# Patient Record
Sex: FEMALE | Race: WHITE | NOT HISPANIC OR LATINO | Employment: FULL TIME | URBAN - METROPOLITAN AREA
[De-identification: names, ages, dates, MRNs, and addresses within clinical notes are randomized per-mention and may not be internally consistent; named-entity substitution may affect disease eponyms.]

---

## 2017-07-27 ENCOUNTER — HOSPITAL ENCOUNTER (OUTPATIENT)
Dept: LAB | Facility: MEDICAL CENTER | Age: 72
End: 2017-07-27
Attending: INTERNAL MEDICINE
Payer: COMMERCIAL

## 2017-07-27 ENCOUNTER — HOSPITAL ENCOUNTER (OUTPATIENT)
Dept: RADIOLOGY | Facility: MEDICAL CENTER | Age: 72
End: 2017-07-27
Attending: INTERNAL MEDICINE
Payer: COMMERCIAL

## 2017-07-27 DIAGNOSIS — C50.919 MALIGNANT NEOPLASM OF FEMALE BREAST, UNSPECIFIED LATERALITY, UNSPECIFIED SITE OF BREAST: ICD-10-CM

## 2017-07-27 LAB
25(OH)D3 SERPL-MCNC: 17 NG/ML (ref 30–100)
ALBUMIN SERPL BCP-MCNC: 4.4 G/DL (ref 3.2–4.9)
ALBUMIN/GLOB SERPL: 1.4 G/DL
ALP SERPL-CCNC: 54 U/L (ref 30–99)
ALT SERPL-CCNC: 20 U/L (ref 2–50)
ANION GAP SERPL CALC-SCNC: 12 MMOL/L (ref 0–11.9)
AST SERPL-CCNC: 20 U/L (ref 12–45)
BASOPHILS # BLD AUTO: 0.3 % (ref 0–1.8)
BASOPHILS # BLD: 0.02 K/UL (ref 0–0.12)
BILIRUB SERPL-MCNC: 0.8 MG/DL (ref 0.1–1.5)
BUN SERPL-MCNC: 12 MG/DL (ref 8–22)
CALCIUM SERPL-MCNC: 9.5 MG/DL (ref 8.4–10.2)
CHLORIDE SERPL-SCNC: 106 MMOL/L (ref 96–112)
CO2 SERPL-SCNC: 25 MMOL/L (ref 20–33)
CREAT SERPL-MCNC: 0.84 MG/DL (ref 0.5–1.4)
EOSINOPHIL # BLD AUTO: 0.05 K/UL (ref 0–0.51)
EOSINOPHIL NFR BLD: 0.7 % (ref 0–6.9)
ERYTHROCYTE [DISTWIDTH] IN BLOOD BY AUTOMATED COUNT: 47.4 FL (ref 35.9–50)
GFR SERPL CREATININE-BSD FRML MDRD: >60 ML/MIN/1.73 M 2
GLOBULIN SER CALC-MCNC: 3.1 G/DL (ref 1.9–3.5)
GLUCOSE SERPL-MCNC: 98 MG/DL (ref 65–99)
HCT VFR BLD AUTO: 47 % (ref 37–47)
HGB BLD-MCNC: 15.7 G/DL (ref 12–16)
IMM GRANULOCYTES # BLD AUTO: 0.02 K/UL (ref 0–0.11)
IMM GRANULOCYTES NFR BLD AUTO: 0.3 % (ref 0–0.9)
LYMPHOCYTES # BLD AUTO: 1.59 K/UL (ref 1–4.8)
LYMPHOCYTES NFR BLD: 22.1 % (ref 22–41)
MCH RBC QN AUTO: 31.8 PG (ref 27–33)
MCHC RBC AUTO-ENTMCNC: 33.4 G/DL (ref 33.6–35)
MCV RBC AUTO: 95.1 FL (ref 81.4–97.8)
MONOCYTES # BLD AUTO: 0.42 K/UL (ref 0–0.85)
MONOCYTES NFR BLD AUTO: 5.8 % (ref 0–13.4)
NEUTROPHILS # BLD AUTO: 5.09 K/UL (ref 2–7.15)
NEUTROPHILS NFR BLD: 70.8 % (ref 44–72)
NRBC # BLD AUTO: 0 K/UL
NRBC BLD AUTO-RTO: 0 /100 WBC
PLATELET # BLD AUTO: 199 K/UL (ref 164–446)
PMV BLD AUTO: 10 FL (ref 9–12.9)
POTASSIUM SERPL-SCNC: 4.7 MMOL/L (ref 3.6–5.5)
PROT SERPL-MCNC: 7.5 G/DL (ref 6–8.2)
RBC # BLD AUTO: 4.94 M/UL (ref 4.2–5.4)
SODIUM SERPL-SCNC: 143 MMOL/L (ref 135–145)
WBC # BLD AUTO: 7.2 K/UL (ref 4.8–10.8)

## 2017-07-27 PROCEDURE — 82306 VITAMIN D 25 HYDROXY: CPT

## 2017-07-27 PROCEDURE — 80053 COMPREHEN METABOLIC PANEL: CPT

## 2017-07-27 PROCEDURE — 36415 COLL VENOUS BLD VENIPUNCTURE: CPT

## 2017-07-27 PROCEDURE — 71260 CT THORAX DX C+: CPT

## 2017-07-27 PROCEDURE — 700117 HCHG RX CONTRAST REV CODE 255: Performed by: INTERNAL MEDICINE

## 2017-07-27 PROCEDURE — 85025 COMPLETE CBC W/AUTO DIFF WBC: CPT

## 2017-07-27 RX ADMIN — IOHEXOL 100 ML: 350 INJECTION, SOLUTION INTRAVENOUS at 14:31

## 2017-12-14 ENCOUNTER — HOSPITAL ENCOUNTER (OUTPATIENT)
Dept: RADIOLOGY | Facility: MEDICAL CENTER | Age: 72
End: 2017-12-14
Attending: INTERNAL MEDICINE
Payer: COMMERCIAL

## 2017-12-14 DIAGNOSIS — Z12.39 SCREENING BREAST EXAMINATION: ICD-10-CM

## 2017-12-14 PROCEDURE — G0202 SCR MAMMO BI INCL CAD: HCPCS

## 2018-07-18 ENCOUNTER — HOSPITAL ENCOUNTER (OUTPATIENT)
Dept: RADIOLOGY | Facility: MEDICAL CENTER | Age: 73
End: 2018-07-18
Attending: INTERNAL MEDICINE
Payer: COMMERCIAL

## 2018-07-18 DIAGNOSIS — C50.919 MALIGNANT NEOPLASM OF FEMALE BREAST, UNSPECIFIED ESTROGEN RECEPTOR STATUS, UNSPECIFIED LATERALITY, UNSPECIFIED SITE OF BREAST (HCC): ICD-10-CM

## 2018-07-18 PROCEDURE — 700117 HCHG RX CONTRAST REV CODE 255: Performed by: INTERNAL MEDICINE

## 2018-07-18 PROCEDURE — 77080 DXA BONE DENSITY AXIAL: CPT

## 2018-07-18 PROCEDURE — 71260 CT THORAX DX C+: CPT

## 2018-07-18 RX ADMIN — IOHEXOL 75 ML: 350 INJECTION, SOLUTION INTRAVENOUS at 10:34

## 2018-12-28 ENCOUNTER — HOSPITAL ENCOUNTER (OUTPATIENT)
Dept: RADIOLOGY | Facility: MEDICAL CENTER | Age: 73
End: 2018-12-28
Attending: INTERNAL MEDICINE
Payer: COMMERCIAL

## 2018-12-28 DIAGNOSIS — Z12.31 BREAST CANCER SCREENING BY MAMMOGRAM: ICD-10-CM

## 2018-12-28 PROCEDURE — 77067 SCR MAMMO BI INCL CAD: CPT

## 2019-08-19 ENCOUNTER — HOSPITAL ENCOUNTER (OUTPATIENT)
Dept: RADIOLOGY | Facility: MEDICAL CENTER | Age: 74
End: 2019-08-19
Attending: INTERNAL MEDICINE
Payer: COMMERCIAL

## 2019-08-19 DIAGNOSIS — Z90.10 POSTMASTECTOMY LYMPHANGIOSARCOMA SYNDROME, UNSPECIFIED LATERALITY (HCC): ICD-10-CM

## 2019-08-19 DIAGNOSIS — C50.919 POSTMASTECTOMY LYMPHANGIOSARCOMA SYNDROME, UNSPECIFIED LATERALITY (HCC): ICD-10-CM

## 2019-08-19 PROCEDURE — 71260 CT THORAX DX C+: CPT

## 2019-08-19 PROCEDURE — 700117 HCHG RX CONTRAST REV CODE 255: Performed by: INTERNAL MEDICINE

## 2019-08-19 RX ADMIN — IOHEXOL 75 ML: 350 INJECTION, SOLUTION INTRAVENOUS at 14:01

## 2019-12-11 ENCOUNTER — HOSPITAL ENCOUNTER (OUTPATIENT)
Dept: RADIOLOGY | Facility: MEDICAL CENTER | Age: 74
End: 2019-12-11
Attending: INTERNAL MEDICINE
Payer: COMMERCIAL

## 2019-12-11 DIAGNOSIS — Z90.10 POSTMASTECTOMY LYMPHANGIOSARCOMA SYNDROME, UNSPECIFIED LATERALITY (HCC): ICD-10-CM

## 2019-12-11 DIAGNOSIS — C50.919 POSTMASTECTOMY LYMPHANGIOSARCOMA SYNDROME, UNSPECIFIED LATERALITY (HCC): ICD-10-CM

## 2019-12-11 PROCEDURE — 71260 CT THORAX DX C+: CPT

## 2019-12-11 PROCEDURE — 700117 HCHG RX CONTRAST REV CODE 255: Performed by: INTERNAL MEDICINE

## 2019-12-11 RX ADMIN — IOHEXOL 75 ML: 350 INJECTION, SOLUTION INTRAVENOUS at 12:58

## 2019-12-30 ENCOUNTER — HOSPITAL ENCOUNTER (OUTPATIENT)
Dept: RADIOLOGY | Facility: MEDICAL CENTER | Age: 74
End: 2019-12-30
Attending: INTERNAL MEDICINE
Payer: COMMERCIAL

## 2019-12-30 DIAGNOSIS — Z12.31 VISIT FOR SCREENING MAMMOGRAM: ICD-10-CM

## 2019-12-30 PROCEDURE — 77063 BREAST TOMOSYNTHESIS BI: CPT

## 2020-03-20 ENCOUNTER — OFFICE VISIT (OUTPATIENT)
Dept: PULMONOLOGY | Facility: HOSPICE | Age: 75
End: 2020-03-20
Payer: COMMERCIAL

## 2020-03-20 ENCOUNTER — APPOINTMENT (OUTPATIENT)
Dept: PULMONOLOGY | Facility: HOSPICE | Age: 75
End: 2020-03-20
Payer: COMMERCIAL

## 2020-03-20 VITALS
HEIGHT: 63 IN | HEART RATE: 86 BPM | WEIGHT: 158.13 LBS | BODY MASS INDEX: 28.02 KG/M2 | DIASTOLIC BLOOD PRESSURE: 90 MMHG | OXYGEN SATURATION: 95 % | SYSTOLIC BLOOD PRESSURE: 140 MMHG

## 2020-03-20 DIAGNOSIS — J43.9 PULMONARY EMPHYSEMA, UNSPECIFIED EMPHYSEMA TYPE (HCC): ICD-10-CM

## 2020-03-20 DIAGNOSIS — R91.8 PULMONARY NODULES: ICD-10-CM

## 2020-03-20 PROCEDURE — 99204 OFFICE O/P NEW MOD 45 MIN: CPT | Performed by: INTERNAL MEDICINE

## 2020-03-20 RX ORDER — ATORVASTATIN CALCIUM 10 MG/1
10 TABLET, FILM COATED ORAL DAILY
COMMUNITY
Start: 2020-02-20

## 2020-03-20 RX ORDER — DENOSUMAB 60 MG/ML
60 INJECTION SUBCUTANEOUS ONCE
COMMUNITY
End: 2022-11-30

## 2020-03-20 NOTE — PROGRESS NOTES
Emily Barnes is a 75 y.o. female here for right hilar mass with underlying breast cancer remotely and smoking history. Patient was referred by her primary care.    History of Present Illness:      This lady comes in at the request of Dr. Alcantar, medical oncology for assessment of a right hilar mass.  She was seen by the oncology specialist recently, in December was noted that there was a slight increase the right hilar density.  I reviewed prior films from 2018 and 2016 and I do note that there was some fullness in that area at that time, the radiologist comments on this as well.  It is unclear if this represents a recurrence of her prior right breast cancer, in August 2011 she underwent partial right mastectomy, and then that was followed up by radiation therapy, she declined chemotherapy.    In 2016 a right hilar node was seen, followed up in 2018 and then in December 2019 it was noted to be slightly increased.    She has a long smoking history, 2 packs/day and has done so for many years.  It is certainly possible this could be a primary lung cancer, but the fact that it had been present dating back over 4 years and again 2 years ago only subtly increased changes the potential scenarios.  A needle biopsy was ordered but the radiologist did not feel it could be done safely.  I think we should proceed first with a PET scan, if this shows uptake then we will arrange for bronchoscopic evaluation and possible biopsy, perhaps even E bus as this is Chris and may not be endobronchial.  We will arrange for the PET scan, and if the PET scan is cold or shows no uptake, then I will discuss with the patient whether not we continue close surveillance or proceed with the bronchoscopic sampling.    At our next visit we will review the PET scan, the needle biopsy was canceled by the radiologist, we will decide if we want to proceed with bronchoscopic evaluation but that depends on PET scan and further discussion with this  patient.    Constitutional ROS: No unexpected change in weight, No unexplained fevers  Eyes: No change in vision or blurring or double vision  Mouth/Throat ROS: No sore throat, No recent change in voice or hoarseness  Pulmonary ROS: See present history for pertinent positives  Cardiovascular ROS: No chest pain to suggest acute coronary syndrome  Gastrointestinal ROS: No abdominal pain to suggest peptic disease  Musculoskeletal/Extremities ROS: no acute artritis or unusual swelling  Hematologic/Lymphatic ROS: No easy bleeding or unusual lymph node swelling  Neurologic ROS: No new or unusual weakness  Psychiatric ROS: No hallucinations  Allergic/Immunologic: No  urticaria or allergic rash      Current Outpatient Medications   Medication Sig Dispense Refill   • atorvastatin (LIPITOR) 10 MG Tab Take 10 mg by mouth every day.     • denosumab (PROLIA) 60 MG/ML Solution Prefilled Syringe Inject 60 mg as instructed Once. Twice a year     • anastrozole (ARIMIDEX) 1 MG TABS Take 1 mg by mouth every day.     • Alendronate Sodium (FOSAMAX PO) Take  by mouth every 7 days.       No current facility-administered medications for this visit.        Social History     Tobacco Use   • Smoking status: Former Smoker     Packs/day: 2.00     Years: 58.00     Pack years: 116.00     Types: Cigarettes     Last attempt to quit:      Years since quittin.2   • Smokeless tobacco: Never Used   Substance Use Topics   • Alcohol use: Yes     Comment: 4 per week   • Drug use: No        Past Medical History:   Diagnosis Date   • Breast cancer (HCC)    • Cancer (HCC)     breast   • Cough    • Dental disorder     upper and lower dentures   • EMPHYSEMA    • Pain     occasional breast pain   • Renal disorder     hx of stones   • Renal disorder     stones and cyst   • Snoring    • Sputum production        Past Surgical History:   Procedure Laterality Date   • COLONOSCOPY WITH BIOPSY  3/15/2012    Performed by RC FANG at SURGERY  "[facility] ORS   • BREAST BIOPSY  8/23/2011    Performed by MOLLY PARRA at SURGERY SAME DAY [facility] ORS   • NODE BIOPSY SENTINEL  8/8/2011    Performed by MOLLY PARRA at SURGERY SAME DAY [facility] ORS   • LUMPECTOMY  8/8/2011    Performed by MOLLY PARRA at SURGERY SAME DAY [facility] ORS   • TONSILLECTOMY  1972   • PB RADIATION THERAPY PLAN SIMPLE         Allergies: Flu virus vaccine    Family History   Problem Relation Age of Onset   • Cancer Other    • Stroke Other    • Heart Disease Other    • Lung Cancer Mother        Physical Examination    Vitals:    03/20/20 1331   Height: 1.6 m (5' 3\")   Weight: 71.7 kg (158 lb 2 oz)   Weight % change since last entry.: 0 %   BP: 140/90   Pulse: 86   BMI (Calculated): 28.01       General Appearance: alert, no distress  Skin: Skin color, texture, turgor normal. No rashes or lesions.  Eyes: negative  Oropharynx: Lips, mucosa, and tongue normal. Teeth and gums normal. Oropharynx moist and without lesion  Lungs: positive findings: Scattered rhonchi without wheezes or rales  Heart: negative. RRR without murmur, gallop, or rubs.  No ectopy.  Abdomen: Abdomen soft, non-tender. . No masses,  No organomegaly  Extremities:  No deformities, edema, or skin discoloration  Joints: No acute arthritis  Peripheral Pulses:perfused  Neurologic: intact grossly  No cervical adenopathy    I (soft palate, uvula, fauces, tonsillar pillars visible)    Imaging: CT scan December 2019 showed slight increase in right hilar fullness see discussion above    PFTS: None presently      Assessment and Plan  1. Pulmonary nodule/R hilar mass  History of breast cancer, also smoking history, see detailed discussion    2. Pulmonary emphysema, unspecified emphysema type (HCC)  No clinical asthma or exercise restriction      Arrange for PET scan, follow-up after to decide if we need to do bronchoscopic examination, of note right hilar fullness has been present for a number of years but has suddenly " changed recently  Followup Return in about 4 weeks (around 4/17/2020) for follow up visit with Dr. Jazmín Crain.

## 2020-03-20 NOTE — PATIENT INSTRUCTIONS
This lady comes in at the request of Dr. Alcantar, medical oncology for assessment of a right hilar mass.  She was seen by the oncology specialist recently, in December was noted that there was a slight increase the right hilar density.  I reviewed prior films from 2018 and 2016 and I do note that there was some fullness in that area at that time, the radiologist comments on this as well.  It is unclear if this represents a recurrence of her prior right breast cancer, in August 2011 she underwent partial right mastectomy, and then that was followed up by radiation therapy, she declined chemotherapy.    In 2016 a right hilar node was seen, followed up in 2018 and then in December 2019 it was noted to be slightly increased.    She has a long smoking history, 2 packs/day and has done so for many years.  It is certainly possible this could be a primary lung cancer, but the fact that it had been present dating back over 4 years and again 2 years ago only subtly increased changes the potential scenarios.  A needle biopsy was ordered but the radiologist did not feel it could be done safely.  I think we should proceed first with a PET scan, if this shows uptake then we will arrange for bronchoscopic evaluation and possible biopsy, perhaps even E bus as this is Chris and may not be endobronchial.  We will arrange for the PET scan, and if the PET scan is cold or shows no uptake, then I will discuss with the patient whether not we continue close surveillance or proceed with the bronchoscopic sampling.    At our next visit we will review the PET scan, the needle biopsy was canceled by the radiologist, we will decide if we want to proceed with bronchoscopic evaluation but that depends on PET scan and further discussion with this patient.

## 2020-04-06 ENCOUNTER — HOSPITAL ENCOUNTER (OUTPATIENT)
Dept: RADIOLOGY | Facility: MEDICAL CENTER | Age: 75
End: 2020-04-06
Attending: INTERNAL MEDICINE
Payer: COMMERCIAL

## 2020-04-06 DIAGNOSIS — R91.8 PULMONARY NODULES: ICD-10-CM

## 2020-04-06 PROCEDURE — A9552 F18 FDG: HCPCS

## 2020-04-21 ENCOUNTER — OFFICE VISIT (OUTPATIENT)
Dept: PULMONOLOGY | Facility: HOSPICE | Age: 75
End: 2020-04-21
Payer: COMMERCIAL

## 2020-04-21 ENCOUNTER — TELEPHONE (OUTPATIENT)
Dept: PULMONOLOGY | Facility: HOSPICE | Age: 75
End: 2020-04-21

## 2020-04-21 VITALS
TEMPERATURE: 97.8 F | HEART RATE: 80 BPM | SYSTOLIC BLOOD PRESSURE: 142 MMHG | RESPIRATION RATE: 16 BRPM | WEIGHT: 162 LBS | OXYGEN SATURATION: 96 % | HEIGHT: 63 IN | DIASTOLIC BLOOD PRESSURE: 90 MMHG | BODY MASS INDEX: 28.7 KG/M2

## 2020-04-21 DIAGNOSIS — R91.8 PULMONARY NODULES: ICD-10-CM

## 2020-04-21 DIAGNOSIS — C50.911 CARCINOMA OF RIGHT BREAST METASTATIC TO LUNG (HCC): ICD-10-CM

## 2020-04-21 DIAGNOSIS — C78.00 CARCINOMA OF RIGHT BREAST METASTATIC TO LUNG (HCC): ICD-10-CM

## 2020-04-21 DIAGNOSIS — J43.9 PULMONARY EMPHYSEMA, UNSPECIFIED EMPHYSEMA TYPE (HCC): ICD-10-CM

## 2020-04-21 PROBLEM — C50.919 BREAST CANCER METASTASIZED TO LUNG (HCC): Status: ACTIVE | Noted: 2020-04-21

## 2020-04-21 PROCEDURE — 90670 PCV13 VACCINE IM: CPT | Performed by: INTERNAL MEDICINE

## 2020-04-21 PROCEDURE — 99214 OFFICE O/P EST MOD 30 MIN: CPT | Mod: 25 | Performed by: INTERNAL MEDICINE

## 2020-04-21 PROCEDURE — 90471 IMMUNIZATION ADMIN: CPT | Performed by: INTERNAL MEDICINE

## 2020-04-21 ASSESSMENT — PAIN SCALES - GENERAL: PAINLEVEL: NO PAIN

## 2020-04-21 NOTE — PATIENT INSTRUCTIONS
Emily Barnes is a [facility] lady[place]  [place] aware of all the gail on in [place] and [place].    She comes in today to follow-up on her history of breast cancer in 2011, recent discovery of a right hilar mass, ongoing smoking history and concerned that she could have either bronchogenic malignancy or a metastatic lesion from the prior breast.  PET scan shows uptake and is positive in that region, but remarkably the PET scan is only hot in the right hilar lymph node.  The SUV is 8.3, FDG avid.    I discussed this with my partner Dr. Raymon Foster, she is a candidate for endobronchial ultrasound needle biopsy, the radiologist had previously declined to do an external needle biopsy.  We do have the current caution because of the COVID virus, but under general anesthetic with clear indications we will try to arrange this, Dr. Raymon Foster will be communicating with the facility at [facility], and we will send this through surgical scheduling to have this accomplished as well.  I will see her back after.    Of note no interval hemoptysis weight loss and she is trying to stop smoking.  We will give her the Prevnar vaccine today and I will see her back in about 6 weeks

## 2020-04-21 NOTE — PROGRESS NOTES
Emily Barnes is a 75 y.o. female here for right hilar mass and results of imaging with history of breast cancer and suspicion of bronchogenic malignancy. Patient was referred by her primary care doctor and medical oncologist.    History of Present Illness:      Constitutional ROS: No unexpected change in weight, No unexplained fevers  Eyes: No change in vision or blurring or double vision  Mouth/Throat ROS: No sore throat, No recent change in voice or hoarseness  Pulmonary ROS: See present history for pertinent positives  Cardiovascular ROS: No chest pain to suggest acute coronary syndrome  Gastrointestinal ROS: No abdominal pain to suggest peptic disease  Musculoskeletal/Extremities ROS: no acute artritis or unusual swelling  Hematologic/Lymphatic ROS: No easy bleeding or unusual lymph node swelling  Neurologic ROS: No new or unusual weakness  Psychiatric ROS: No hallucinations  Allergic/Immunologic: No  urticaria or allergic rash      Current Outpatient Medications   Medication Sig Dispense Refill   • atorvastatin (LIPITOR) 10 MG Tab Take 10 mg by mouth every day.     • denosumab (PROLIA) 60 MG/ML Solution Prefilled Syringe Inject 60 mg as instructed Once. Twice a year     • anastrozole (ARIMIDEX) 1 MG TABS Take 1 mg by mouth every day.     • Alendronate Sodium (FOSAMAX PO) Take  by mouth every 7 days.       No current facility-administered medications for this visit.        Social History     Tobacco Use   • Smoking status: Former Smoker     Packs/day: 2.00     Years: 58.00     Pack years: 116.00     Types: Cigarettes     Last attempt to quit: 2     Years since quittin.3   • Smokeless tobacco: Never Used   Substance Use Topics   • Alcohol use: Yes     Comment: 4 per week   • Drug use: No        Past Medical History:   Diagnosis Date   • Breast cancer (HCC)    • Cancer (HCC) 2011    breast   • Cough    • Dental disorder     upper and lower dentures   • EMPHYSEMA    • Pain     occasional breast pain  "  • Renal disorder 1997    hx of stones   • Renal disorder     stones and cyst   • Snoring    • Sputum production        Past Surgical History:   Procedure Laterality Date   • COLONOSCOPY WITH BIOPSY  3/15/2012    Performed by RC FANG at SURGERY [facility] ORS   • BREAST BIOPSY  8/23/2011    Performed by MOLLY PARRA at SURGERY SAME DAY [facility] ORS   • NODE BIOPSY SENTINEL  8/8/2011    [facility] MOLLY PARRA [facility]   • LUMPECTOMY  8/8/2011    Performed by MOLLY PARRA at SURGERY SAME DAY [facility] ORS   • TONSILLECTOMY  1972   • PB RADIATION THERAPY PLAN SIMPLE         Allergies: Flu virus vaccine    Family History   Problem Relation Age of Onset   • Cancer Other    • Stroke Other    • Heart Disease Other    • Lung Cancer Mother    • Heart Disease Father        Physical Examination    Vitals:    04/21/20 1336   Height: 1.6 m (5' 3\")   Weight: 73.5 kg (162 lb)   Weight % change since last entry.: 0 %   BP: 142/90   Pulse: 80   BMI (Calculated): 28.7   Resp: 16   Temp: 36.6 °C (97.8 °F)   TempSrc: Oral       General Appearance: alert, no distress  Skin: Skin color, texture, turgor normal. No rashes or lesions.  Eyes: negative  Oropharynx: Lips, mucosa, and tongue normal. Teeth and gums normal. Oropharynx moist and without lesion  Lungs: positive findings: Quiet and clear  Heart: negative. RRR without murmur, gallop, or rubs.  No ectopy.  Abdomen: Abdomen soft, non-tender. . No masses,  No organomegaly  Extremities:  No deformities, edema, or skin discoloration  Joints: No acute arthritis  Peripheral Pulses:perfused  Neurologic: intact grossly  No clubbing    II (soft palate, uvula, fauces visible)    Imaging: PET scan noted with right hilar lymph node very concerning for either metastatic disease or associated bronchogenic malignancy, FDG avid with SUV 8.3    PFTS: None at this time      Assessment and Plan  1. Pulmonary nodule/R hilar mass  Evaluate for endobronchial " ultrasound under general anesthetic at [facility]; radiology declined external needle biopsy  - Bronchoscopy; Future  - Pneumococcal Conjugate Vaccine 13-Valent    2. Pulmonary emphysema, unspecified emphysema type (HCC)  No active bronchospasm    3. Carcinoma of right breast potentially metastatic to lung (HCC)  In 2011, radiation therapy, no chemotherapy, sees Dr. Alcantar        Followup Return in about 6 weeks (around 6/2/2020) for follow up visit with Dr. Jazmín Crain.

## 2020-04-27 NOTE — TELEPHONE ENCOUNTER
Emily spoke to another MA in office, asking to schedule Bronch. I called and advised Emily that the procedure still has not been approved by the COVID team as of yet.   I will call her when I find out if it has been approved. Patient understands

## 2020-04-28 NOTE — TELEPHONE ENCOUNTER
Bronchoscopy approved; per surgery scheduling, we need to contact Infection Prevention at [phone] to arrange for them to get COVID testing, 72 hours prior to bronch and the day before.     Call placed to patient to explain process

## 2020-04-28 NOTE — TELEPHONE ENCOUNTER
Patient verbally understands pre op instructions.   Advised about the COVID 19 testing, I do not know all the details as of yet but told her I would call her as soon as I knew something.   She understands   Bronch is cheduled for 5/5/2020 checking in at 0800 for a 1000 start time.

## 2020-04-29 RX ORDER — MULTIVIT-MIN/IRON/FOLIC ACID/K 18-600-40
4000 CAPSULE ORAL DAILY
COMMUNITY

## 2020-04-29 NOTE — OR NURSING
"Preadmit appointment: \" Preparing for your Procedure information\" sheet given to patient with verbal instructions. Patient instructed to continue prescribed medications through the day before surgery, instructed to take the following medications the day of surgery per anesthesia protocol: none, pt takes her medications in the afternoon.  Pt denies any issues with anesthesia.  Pt denies respiratory symptoms or fever other than her usual \"smoker\"s cough\" and denies any changes with her cough or exposure to anyone with positive [id number]- advised patient if this changes prior to her procedure to notify Dr. Foster's office prior to coming in for her procedure.   "

## 2020-04-30 ENCOUNTER — APPOINTMENT (OUTPATIENT)
Dept: ADMISSIONS | Facility: MEDICAL CENTER | Age: 75
End: 2020-04-30
Payer: COMMERCIAL

## 2020-04-30 ENCOUNTER — HOSPITAL ENCOUNTER (OUTPATIENT)
Facility: MEDICAL CENTER | Age: 75
End: 2020-04-30
Attending: INTERNAL MEDICINE
Payer: COMMERCIAL

## 2020-04-30 LAB — COVID ORDER STATUS COVID19: NORMAL

## 2020-04-30 NOTE — TELEPHONE ENCOUNTER
Per COVID protocol with testing we needed to move bronch to 5/7/2020.   Emily is currently being tested for Covid and has her second test today. I have left a message for Ledy FIERRO and patricia admit to see if she still needs to do more next week.

## 2020-05-01 LAB
SARS-COV-2 RNA RESP QL NAA+PROBE: NOT DETECTED
SPECIMEN SOURCE: NORMAL

## 2020-05-06 NOTE — OR NURSING
COVID-19 Pre-surgery screenin. Do you have an undiagnosed respiratory illness or symptoms such as coughing or sneezing? NO (Yes/No)    2. Do you have an unexplained fever greater than 100.4 degrees Fahrenheit or 38 degrees Celsius?     NO (Yes/No)    3. Have you had direct exposure to a patient who tested positive for Covid-19?    NO (Yes/No)

## 2020-05-07 ENCOUNTER — HOSPITAL ENCOUNTER (OUTPATIENT)
Facility: MEDICAL CENTER | Age: 75
End: 2020-05-07
Attending: INTERNAL MEDICINE | Admitting: INTERNAL MEDICINE
Payer: COMMERCIAL

## 2020-05-07 ENCOUNTER — ANESTHESIA EVENT (OUTPATIENT)
Dept: SURGERY | Facility: MEDICAL CENTER | Age: 75
End: 2020-05-07
Payer: COMMERCIAL

## 2020-05-07 ENCOUNTER — ANESTHESIA (OUTPATIENT)
Dept: SURGERY | Facility: MEDICAL CENTER | Age: 75
End: 2020-05-07
Payer: COMMERCIAL

## 2020-05-07 VITALS
SYSTOLIC BLOOD PRESSURE: 117 MMHG | DIASTOLIC BLOOD PRESSURE: 69 MMHG | TEMPERATURE: 97.5 F | BODY MASS INDEX: 28.71 KG/M2 | HEIGHT: 63 IN | HEART RATE: 81 BPM | WEIGHT: 162.04 LBS | RESPIRATION RATE: 16 BRPM | OXYGEN SATURATION: 88 %

## 2020-05-07 LAB
EKG IMPRESSION: NORMAL
PATHOLOGY CONSULT NOTE: NORMAL

## 2020-05-07 PROCEDURE — 700105 HCHG RX REV CODE 258: Performed by: INTERNAL MEDICINE

## 2020-05-07 PROCEDURE — 160048 HCHG OR STATISTICAL LEVEL 1-5: Performed by: INTERNAL MEDICINE

## 2020-05-07 PROCEDURE — 88172 CYTP DX EVAL FNA 1ST EA SITE: CPT

## 2020-05-07 PROCEDURE — 700101 HCHG RX REV CODE 250: Performed by: ANESTHESIOLOGY

## 2020-05-07 PROCEDURE — 700111 HCHG RX REV CODE 636 W/ 250 OVERRIDE (IP): Performed by: ANESTHESIOLOGY

## 2020-05-07 PROCEDURE — A9270 NON-COVERED ITEM OR SERVICE: HCPCS | Performed by: INTERNAL MEDICINE

## 2020-05-07 PROCEDURE — 93005 ELECTROCARDIOGRAM TRACING: CPT | Performed by: INTERNAL MEDICINE

## 2020-05-07 PROCEDURE — 88341 IMHCHEM/IMCYTCHM EA ADD ANTB: CPT | Mod: 91

## 2020-05-07 PROCEDURE — 500066 HCHG BITE BLOCK, ECT: Performed by: INTERNAL MEDICINE

## 2020-05-07 PROCEDURE — 160041 HCHG SURGERY MINUTES - EA ADDL 1 MIN LEVEL 4: Performed by: INTERNAL MEDICINE

## 2020-05-07 PROCEDURE — 160009 HCHG ANES TIME/MIN: Performed by: INTERNAL MEDICINE

## 2020-05-07 PROCEDURE — 160029 HCHG SURGERY MINUTES - 1ST 30 MINS LEVEL 4: Performed by: INTERNAL MEDICINE

## 2020-05-07 PROCEDURE — 160036 HCHG PACU - EA ADDL 30 MINS PHASE I: Performed by: INTERNAL MEDICINE

## 2020-05-07 PROCEDURE — 160046 HCHG PACU - 1ST 60 MINS PHASE II: Performed by: INTERNAL MEDICINE

## 2020-05-07 PROCEDURE — 160025 RECOVERY II MINUTES (STATS): Performed by: INTERNAL MEDICINE

## 2020-05-07 PROCEDURE — 31624 DX BRONCHOSCOPE/LAVAGE: CPT | Performed by: INTERNAL MEDICINE

## 2020-05-07 PROCEDURE — 302978 HCHG BRONCHOSCOPY-DIAGNOSTIC

## 2020-05-07 PROCEDURE — 160035 HCHG PACU - 1ST 60 MINS PHASE I: Performed by: INTERNAL MEDICINE

## 2020-05-07 PROCEDURE — 31652 BRONCH EBUS SAMPLNG 1/2 NODE: CPT | Performed by: INTERNAL MEDICINE

## 2020-05-07 PROCEDURE — 88173 CYTOPATH EVAL FNA REPORT: CPT | Mod: 91

## 2020-05-07 PROCEDURE — 700101 HCHG RX REV CODE 250: Performed by: INTERNAL MEDICINE

## 2020-05-07 PROCEDURE — 88112 CYTOPATH CELL ENHANCE TECH: CPT

## 2020-05-07 PROCEDURE — 88342 IMHCHEM/IMCYTCHM 1ST ANTB: CPT

## 2020-05-07 PROCEDURE — 88305 TISSUE EXAM BY PATHOLOGIST: CPT | Mod: 59

## 2020-05-07 PROCEDURE — 160002 HCHG RECOVERY MINUTES (STAT): Performed by: INTERNAL MEDICINE

## 2020-05-07 PROCEDURE — 160047 HCHG PACU  - EA ADDL 30 MINS PHASE II: Performed by: INTERNAL MEDICINE

## 2020-05-07 PROCEDURE — 700102 HCHG RX REV CODE 250 W/ 637 OVERRIDE(OP): Performed by: INTERNAL MEDICINE

## 2020-05-07 PROCEDURE — 93010 ELECTROCARDIOGRAM REPORT: CPT | Performed by: INTERNAL MEDICINE

## 2020-05-07 RX ORDER — MEPERIDINE HYDROCHLORIDE 25 MG/ML
INJECTION INTRAMUSCULAR; INTRAVENOUS; SUBCUTANEOUS PRN
Status: DISCONTINUED | OUTPATIENT
Start: 2020-05-07 | End: 2020-05-07 | Stop reason: SURG

## 2020-05-07 RX ORDER — METOPROLOL TARTRATE 1 MG/ML
1 INJECTION, SOLUTION INTRAVENOUS
Status: DISCONTINUED | OUTPATIENT
Start: 2020-05-07 | End: 2020-05-07 | Stop reason: HOSPADM

## 2020-05-07 RX ORDER — SODIUM CHLORIDE, SODIUM LACTATE, POTASSIUM CHLORIDE, CALCIUM CHLORIDE 600; 310; 30; 20 MG/100ML; MG/100ML; MG/100ML; MG/100ML
INJECTION, SOLUTION INTRAVENOUS CONTINUOUS
Status: DISCONTINUED | OUTPATIENT
Start: 2020-05-07 | End: 2020-05-07 | Stop reason: HOSPADM

## 2020-05-07 RX ORDER — HALOPERIDOL 5 MG/ML
1 INJECTION INTRAMUSCULAR
Status: DISCONTINUED | OUTPATIENT
Start: 2020-05-07 | End: 2020-05-07 | Stop reason: HOSPADM

## 2020-05-07 RX ORDER — MEPERIDINE HYDROCHLORIDE 25 MG/ML
25 INJECTION INTRAMUSCULAR; INTRAVENOUS; SUBCUTANEOUS
Status: DISCONTINUED | OUTPATIENT
Start: 2020-05-07 | End: 2020-05-07 | Stop reason: HOSPADM

## 2020-05-07 RX ORDER — ONDANSETRON 2 MG/ML
4 INJECTION INTRAMUSCULAR; INTRAVENOUS
Status: DISCONTINUED | OUTPATIENT
Start: 2020-05-07 | End: 2020-05-07 | Stop reason: HOSPADM

## 2020-05-07 RX ORDER — ONDANSETRON 2 MG/ML
INJECTION INTRAMUSCULAR; INTRAVENOUS PRN
Status: DISCONTINUED | OUTPATIENT
Start: 2020-05-07 | End: 2020-05-07 | Stop reason: SURG

## 2020-05-07 RX ORDER — LIDOCAINE HYDROCHLORIDE 40 MG/ML
SOLUTION TOPICAL PRN
Status: DISCONTINUED | OUTPATIENT
Start: 2020-05-07 | End: 2020-05-07 | Stop reason: SURG

## 2020-05-07 RX ORDER — DIPHENHYDRAMINE HYDROCHLORIDE 50 MG/ML
12.5 INJECTION INTRAMUSCULAR; INTRAVENOUS
Status: DISCONTINUED | OUTPATIENT
Start: 2020-05-07 | End: 2020-05-07 | Stop reason: HOSPADM

## 2020-05-07 RX ADMIN — POVIDONE-IODINE 15 ML: 10 SOLUTION TOPICAL at 11:44

## 2020-05-07 RX ADMIN — ROCURONIUM BROMIDE 5 MG: 10 INJECTION INTRAVENOUS at 12:26

## 2020-05-07 RX ADMIN — LIDOCAINE HYDROCHLORIDE 20 MG: 20 INJECTION, SOLUTION INFILTRATION; PERINEURAL at 12:26

## 2020-05-07 RX ADMIN — SODIUM CHLORIDE, POTASSIUM CHLORIDE, SODIUM LACTATE AND CALCIUM CHLORIDE: 600; 310; 30; 20 INJECTION, SOLUTION INTRAVENOUS at 11:44

## 2020-05-07 RX ADMIN — ROCURONIUM BROMIDE 25 MG: 10 INJECTION INTRAVENOUS at 12:37

## 2020-05-07 RX ADMIN — PROPOFOL 20 MG: 10 INJECTION, EMULSION INTRAVENOUS at 13:22

## 2020-05-07 RX ADMIN — PROPOFOL 40 MG: 10 INJECTION, EMULSION INTRAVENOUS at 12:28

## 2020-05-07 RX ADMIN — ONDANSETRON 4 MG: 2 INJECTION INTRAMUSCULAR; INTRAVENOUS at 13:17

## 2020-05-07 RX ADMIN — ALBUTEROL SULFATE 2.5 MG: 2.5 SOLUTION RESPIRATORY (INHALATION) at 15:43

## 2020-05-07 RX ADMIN — ALFENTANIL HYDROCHLORIDE 1000 MCG: 500 INJECTION, SOLUTION INTRAVENOUS at 12:26

## 2020-05-07 RX ADMIN — SUGAMMADEX 150 MG: 100 INJECTION, SOLUTION INTRAVENOUS at 13:26

## 2020-05-07 RX ADMIN — ROCURONIUM BROMIDE 10 MG: 10 INJECTION INTRAVENOUS at 13:16

## 2020-05-07 RX ADMIN — LIDOCAINE HYDROCHLORIDE 160 MG: 40 SOLUTION TOPICAL at 12:29

## 2020-05-07 RX ADMIN — PROPOFOL 20 MG: 10 INJECTION, EMULSION INTRAVENOUS at 12:26

## 2020-05-07 RX ADMIN — MEPERIDINE HYDROCHLORIDE 25 MG: 25 INJECTION INTRAMUSCULAR; INTRAVENOUS; SUBCUTANEOUS at 12:40

## 2020-05-07 RX ADMIN — LIDOCAINE HYDROCHLORIDE 0.5 ML: 10 INJECTION, SOLUTION INFILTRATION; PERINEURAL at 11:38

## 2020-05-07 RX ADMIN — SUCCINYLCHOLINE CHLORIDE 100 MG: 20 INJECTION, SOLUTION INTRAMUSCULAR; INTRAVENOUS at 12:28

## 2020-05-07 ASSESSMENT — PAIN SCALES - GENERAL: PAIN_LEVEL: 0

## 2020-05-07 NOTE — OR NURSING
1452- Patient dressed and resting in recliner chair on 1L O2 via mask.  1510- O2 dc'd; on room air.  1520- IS initiated; achieving 750.  1525- Ambulated on room air.  1545- Wheezing noted; albuterol treatment administered.  1550- Albuterol treatment finished. Will continue to monitor oxygen saturation.  1610- Wheezing improved and O2 saturation maintained at 90% and above. Meets criteria for dc to home.  1612- Discharge instructions completed at [facility]. Discharged to care of family.

## 2020-05-07 NOTE — OR NURSING
1335- To PACU from OR, Report Received, Pt upright on gurney, coughing, respirations spontaneous and non-labored with simple mask.  Monitors on VSS.    1350-loose sounding cough but non-productive.  Pt alert, denies pain, 02 at 5 liters mask.

## 2020-05-07 NOTE — ANESTHESIA POSTPROCEDURE EVALUATION
Patient: Emily Barnes    Procedure Summary     Date:  05/07/20 Room / Location:   PROCEDURE ROOM / SURGERY [facility]    Anesthesia Start:  1225 Anesthesia Stop:  1335    Procedures:       BRONCHOSCOPY-FIBER OPTIC WITH POSSIBLE WASH, BRUSH, BROCHOALVEOLAR LAVAGE, BIOPSY FINE NEEDLE ASPIRATION (Chest)      ENDOBRONCHIAL ULTRASOUND (EBUS) Diagnosis:  (PULMONARY NODULES  pending pathology)    Surgeon:  Peter Foster M.D. Responsible Provider:  Lev Momin M.D.    Anesthesia Type:  general ASA Status:  4          Final Anesthesia Type: general  Last vitals  BP   Blood Pressure : 140/83    Temp   37.3 °C (99.1 °F)    Pulse   Pulse: 85   Resp   18    SpO2          Anesthesia Post Evaluation    Patient location during evaluation: PACU  Patient participation: complete - patient participated  Level of consciousness: awake and alert  Pain score: 0    Airway patency: patent  Anesthetic complications: no  Cardiovascular status: hemodynamically stable  Respiratory status: acceptable  Hydration status: euvolemic    PONV: none           Nurse Pain Score: 0 (NPRS)

## 2020-05-07 NOTE — DISCHARGE INSTRUCTIONS
Bronchoscopy Discharge Instructions  Home Care Instructions    ACTIVITY: Rest and take it easy for the first 24 hours.  A responsible adult is recommended to remain with you during that time.  It is normal to feel sleepy.  We encourage you to not do anything that requires balance, judgment or coordination.    The medicine you had during the bronchoscopy will make you sleepy.    FOR 24 HOURS DO NOT:  Drive, operate machinery or run household appliances.  Drink beer or alcoholic beverages.  Make important decisions or sign legal documents.  Engage in activity that requires sharp judgment and reflexes for 24 hours    SPECIAL INSTRUCTIONS:    Bronchoscopy is a procedure to look inside your windpipe and bronchial tubes.  An anesthetic solution is sprayed in your throat to make it numb.    You may experience a mild sore throat, hoarseness, fever up to 101?F, and /or coughing up small amounts of blood immediately following your bronchoscopy, especially if a biopsy was performed.  The discomfort should subside in 24-48 hours.    Do not smoke for 6-8 hours after the procedure to decrease your risk of coughing and /or bleeding.    Do not drink fluids or eat until your gag reflex returns, for two hours after the bronchoscopy.  Otherwise you will not feel the food or fluid in your throat, and it may go down your windpipe and cause you to choke.    Take ice chips or slowly sip cool fluids to make sure your gag reflex has returned.  Avoid hot fluids from the microwave for several hours.    After 2 hours or when you get home you may take throat lozenges or gargle with salt water if your throat is sore.  Drink liquids to help dryness in your mouth and throat.    Resume your normal activities the following day.    MEDICATIONS: Resume taking daily medication as directed by your doctor.    A follow-up appointment should be arranged with your doctor in 1 week to get the results of the bronchoscopy and any tests done during the  procedure; call to schedule.      You should CALL YOUR PHYSICIAN if you develop:  Fever greater than 101?F  You cough up more than a teaspoon of blood other than blood-tinged mucus  You have increasing amounts of bleeding from coughing after the bronchoscopy  You are wheezing  You develop any unusual signs or symptoms or have any questions                You should call 911 if you develop problems with breathing or chest pain.    If you are unable to contact your doctor or surgical center, you should go to the nearest emergency room or urgent care center.      Dr. Foster's telephone #: [phone]      If any questions arise, call your doctor.  If your doctor is not available, please feel free to call the Surgical Center at [phone].  The Center is open Monday through Friday from 7AM to 7PM.  You can also call the [facility] HOTLINE open 24 hours/day, 7 days/week and speak to a nurse at [phone], or toll free at [phone].    · You may receive a survey in the mail within the next two weeks and we ask that you take a few moments to complete the survey and return it to us.  Our goal is to provide you with very good care and we value your comments.

## 2020-05-07 NOTE — FLOWSHEET NOTE
05/07/20 1345   Bronchoscopy Procedure   Bronchoscopy Procedure Yes   Order placed in Epic? Yes   Pre-Op Teaching Yes   Consent Signed Yes   Time Out Performed Yes   Medication Allergy Reviewed Yes   $ Bronchoscopy Procedure Diagnostic   Start Time 1232   End Time 1352   Performing Physician Cristian   Picture/Video Obtained   (Yes)   Scope Serial Number / Disposal   ([id number] Endo scope and [id number] Bronch scope (broken))   Post Procedure Response See PMA and Anesthesiologist notes

## 2020-05-07 NOTE — ANESTHESIA QCDR
2019 [facility] Clinical Data Registry (for Quality Improvement)     Postoperative nausea/vomiting risk protocol (Adult = 18 yrs and Pediatric 3-17 yrs)- (430 and 463)  General inhalation anesthetic (NOT TIVA) with PONV risk factors: No  Provision of anti-emetic therapy with at least 2 different classes of agents: N/A  Patient DID NOT receive anti-emetic therapy and reason is documented in Medical Record: N/A    Multimodal Pain Management- (477)  Non-emergent surgery AND patient age >= 18: Yes  Use of Multimodal Pain Management, two or more drugs and/or interventions, NOT including systemic opioids: No  Exception: Documented allergy to multiple classes of analgesics: No       Smoking Abstinence (404)  Patient is current smoker (cigarette, pipe, e-cig, marijuanna): Yes  Elective Surgery: Yes  Abstinence instructions provided prior to day of surgery: No  Patient abstained from smoking on day of surgery:     Pre-Op Beta-Blocker in Isolated CABG (44)  Isolated CABG AND patient age >= 18: No  Beta-blocker admin within 24 hours of surgical incision:   Exception:of medical reason(s) for not administering beta blocker within 24 hours prior to surgical incision (e.g., not  indicated,other medical reason):     PACU assessment of acute postoperative pain prior to Anesthesia Care End- Applies to Patients Age = 18- (ABG7)  Initial PACU pain score is which of the following: < 7/10  Patient unable to report pain score: N/A    Post-anesthetic transfer of care checklist/protocol to PACU/ICU- (426 and 427)  Upon conclusion of case, patient transferred to which of the following locations: PACU/Non-ICU  Use of transfer checklist/protocol: Yes  Exclusion: Service Performed in Patient Hospital Room (and thus did not require transfer): N/A  Unplanned admission to ICU related to anesthesia service up through end of PACU care- (MD51)  Unplanned admission to ICU (not initially anticipated at anesthesia start time): No

## 2020-05-07 NOTE — ANESTHESIA TIME REPORT
Anesthesia Start and Stop Event Times     Date Time Event    5/7/2020 1129 Ready for Procedure     1225 Anesthesia Start     1335 Anesthesia Stop        Responsible Staff  05/07/20    Name Role Begin End    Lev Momin M.D. Anesth 1225 1335        Preop Diagnosis (Free Text):  Pre-op Diagnosis     PULMONARY NODULES        Preop Diagnosis (Codes):    Post op Diagnosis  Breast cancer (HCC)      Premium Reason  Non-Premium    Comments:

## 2020-05-07 NOTE — ANESTHESIA PROCEDURE NOTES
Airway  Date/Time: 5/7/2020 12:29 PM  Performed by: Lev Momin M.D.  Authorized by: Lev Momin M.D.     Location:  OR  Urgency:  Elective  Indications for Airway Management:  Anesthesia      Spontaneous Ventilation: absent    Sedation Level:  Deep  Preoxygenated: Yes    Patient Position:  Sniffing  Final Airway Type:  Endotracheal airway  Final Endotracheal Airway:  ETT  Cuffed: Yes    Technique Used for Successful ETT Placement:  Direct laryngoscopy  Devices/Methods Used in Placement:  Intubating stylet    Insertion Site:  Oral  Blade Type:  Keller  Laryngoscope Blade/Videolaryngoscope Blade Size:  2  ETT Size (mm):  8.5  Measured from:  Lips  ETT to Lips (cm):  21  Placement Verified by: auscultation and capnometry    Cormack-Lehane Classification:  Grade I - full view of glottis  Number of Attempts at Approach:  1

## 2020-05-07 NOTE — ANESTHESIA PREPROCEDURE EVALUATION
Relevant Problems   PULMONARY   (+) Pulmonary emphysema (HCC)       Physical Exam    Airway   Mallampati: II  TM distance: >3 FB  Neck ROM: full       Cardiovascular - normal exam  Rhythm: regular  Rate: normal     Dental - normal exam           Pulmonary - normal exam  Breath sounds clear to auscultation  (+) rhonchi, wheezes     Abdominal    Neurological - normal exam                 Anesthesia Plan    ASA 4   ASA physical status 4 criteria: other (comment)    Plan - general       Airway plan will be ETT        Induction: intravenous    Postoperative Plan: Postoperative administration of opioids is intended.    Pertinent diagnostic labs and testing reviewed    Informed Consent:    Anesthetic plan and risks discussed with patient.    Use of blood products discussed with: patient whom consented to blood products.

## 2020-05-07 NOTE — PROCEDURES
Bronchoscopy with Endobronchial Biopsy/TBNA Procedure Note    Findings:  Malignant cells at lymph node station 10R:      Malignant cells at lymph node station 11R       Location: [facility]    Date of Operation: 5/7/2020     Attending Physician Performing Procedure: Peter Foster M.D.     Pre-op Diagnosis: Hilar lymphadenopathy, history of breast cancer, history of tobacco abuse    Post-op Diagnosis: Hilar lymphadenopathy, history of breast cancer, history of tobacco abuse    Anesthesia: General, administered by Dr Momin    Operation:   Diagnostic flexible fiberoptic bronchoscopy, with bronchoalveolar lavage  Endobronchial Ultrasound and transbronchial needle aspiration    Specimen: Bronchoalveolar lavage, transbronchial needle aspiration stations 11 R, 10 R    Estimated Blood Loss: 20cc    Complications: None    Indications and History:    The patient is a 75 y.o. female. The risks, benefits, complications, treatment options and expected outcomes were discussed with the patient. The possibilities of reaction to medication, pulmonary aspiration, perforation of a viscus, bleeding, failure to diagnose a condition and creating a complication requiring transfusion or operation were discussed with the patient who freely signed the consent.    Description of Procedure:    The patient was seen in the Pre-op area and interval H&P was verified. The patient was taken to the endoscopy suite, identified as Emily Barnes and a Time Out was held and the above information confirmed. After the induction of general anesthesia, the patient was positioned supine and the endobronchial ultrasound bronchoscope was passed through the ET tube. The scope was then passed into the trachea. Careful inspection of the tracheal lumen was accomplished.     Using the endobronchial ultrasound, a systematic survey of the accessible mediastinal and hilar lymph nodes was performed, notable for lymphadenopathy at  stations 10R and 11R. Then, under direct ultrasound visualization, transbronchial needle aspirations were performed at the following lymph node stations:    1. SIZE OF NEEDLE   19G Olympus    2. STATIONS SAMPLED  10R, 11R    3. CALVIN CONFIRMATION  Malignant cells at 10R  Malignant cells at 11R with lymphocytes    The endobronchial ultrasound was then withdrawn and the bronchoscope was subsequently passed into the left main and then left upper and lower bronchi and segmental bronchi.  The scope was then withdrawn and advanced into the right main bronchus and then into the RUL, RML, and RLL bronchi and segmental bronchi.     Trachea: normal bronchial mucosa  Svetlana: normal bronchial mucosa  Right main bronchus: normal bronchial mucosa  Right upper lobe bronchus: normal bronchial mucosa  Right middle lobe bronchus: normal bronchial mucosa  Right lower lobe bronchus: normal bronchial mucosa  Left main bronchus: normal bronchial mucosa  Left upper lobe bronchus: normal bronchial mucosa  Left lower lobe bronchus: normal bronchial mucosa    Bronchoalveolar lavage was performed in the right mainstem bronchus. Lavage specimen was bloody in gross appearance.    The Patient was subsequently taken to the PACU in satisfactory condition.    Estela chaney) was notified of the results of the procedure who will be driving patient home after recovery in PACU.    __________  Peter Foster MD  Pulmonary and Critical Care Medicine  [facility]

## 2020-05-07 NOTE — OR NURSING
1350 Pt denies pain or nausea.    1400 Dr Foster @ BS to update Pt on POC.    1435 Report to Hudson CUADRA.     1447 Pt to phase II

## 2020-05-08 ENCOUNTER — TELEPHONE (OUTPATIENT)
Dept: SLEEP MEDICINE | Facility: MEDICAL CENTER | Age: 75
End: 2020-05-08

## 2020-05-08 DIAGNOSIS — C34.01 SMALL CELL CARCINOMA OF MAIN BRONCHUS, RIGHT (HCC): ICD-10-CM

## 2020-05-08 NOTE — TELEPHONE ENCOUNTER
Received a call from pathology- final path shows small cell CA, NOT breast primary  Sufficient tissue for PDL-1 and molecular testing, etc  Called and informed patient, she did not answer, spoke to her daughter who will inform the pt and ordered brain MRI, referral to radiation oncology    Cc: Katharine Chung Green    Orders Placed This Encounter   • MR-BRAIN-WITH & W/O   • REFERRAL TO RADIATION ONCOLOGY     __________  Peter Foster MD  Pulmonary and Critical Care Medicine  [facility]

## 2020-05-11 ENCOUNTER — PATIENT OUTREACH (OUTPATIENT)
Dept: OTHER | Facility: MEDICAL CENTER | Age: 75
End: 2020-05-11

## 2020-05-11 DIAGNOSIS — C34.01 SMALL CELL CARCINOMA OF MAIN BRONCHUS, RIGHT (HCC): ICD-10-CM

## 2020-05-11 NOTE — PROGRESS NOTES
Cancer nurse navigation referral/request to assist with getting MRI scheduled.  Newly dx small cell lung cancer patient, may qualify for clinical trail.  Contacted scheduling.  Her insurance takes up to 7 days to get authorization so soonest they will  her is May 20th.  They will call patient today.  Asked them to schedule pt as soon as possible with above restrictions.    Updated Wayne, clinical trial coordinator and Dr Cruz, radiation oncology.    Pt scheduled for MRI May 26th with Dr Cruz f/u on June 1st

## 2020-05-14 ENCOUNTER — PATIENT OUTREACH (OUTPATIENT)
Dept: OTHER | Facility: MEDICAL CENTER | Age: 75
End: 2020-05-14

## 2020-05-14 NOTE — PROGRESS NOTES
Call placed to patient.  Introduced self and explained role of cancer nurse navigator.  She is aware of her upcoming appointments for her MRI and consult with radiation oncology.  She reports does not have scheduled appointment with Dr Alcantar.  ONN will reach out to Dr Alcantar's office regarding recent pathology results.  Pt grateful.  Pt states has transportation, denies current financial needs and says she is doing ok from emotional standpoint.  Contact information provided.  Continue to follow.    Call placed to [facility].  They reported that Dr Alcantar had received information from Dr Foster, but they did not show pathology results yet.  Provided information that [facility] has results available.  Pt's previously scheduled appointment was not until September.  They will pass on information that patient has question regarding new appointment to discuss plan of care based on new pathology results.

## 2020-05-15 ENCOUNTER — PATIENT OUTREACH (OUTPATIENT)
Dept: OTHER | Facility: MEDICAL CENTER | Age: 75
End: 2020-05-15

## 2020-05-15 NOTE — PROGRESS NOTES
"Request to see if patient can get MRI done, no later than the 19th for possible clinical trial.  Call placed to scheduling, information provided that insurance may not have authorized by then, and if she was ok signing waiver regarding coverage they could .  Call placed to patient, she reports is \"willing\" to do \"whatever the doctor wants\".  Pt scheduled for Monday at 12:45, [place] location.  Pt needs labs done prior as well.  Notified patient of scan, to come well hydrated, by herself with face covering/mask.  Discussed that she also needs to have her labs done.  Provided information on location and hours of lab.  Pt will get her labs done at [facility] tomorrow, she is watching her grandchild today and unable to do them today.  Updated Wayne with clinical trials and Dr Cruz.  "

## 2020-05-16 ENCOUNTER — HOSPITAL ENCOUNTER (OUTPATIENT)
Dept: LAB | Facility: MEDICAL CENTER | Age: 75
End: 2020-05-16
Attending: INTERNAL MEDICINE
Payer: COMMERCIAL

## 2020-05-16 DIAGNOSIS — C34.01 SMALL CELL CARCINOMA OF MAIN BRONCHUS, RIGHT (HCC): ICD-10-CM

## 2020-05-16 LAB
ANION GAP SERPL CALC-SCNC: 16 MMOL/L (ref 7–16)
BUN SERPL-MCNC: 13 MG/DL (ref 8–22)
CALCIUM SERPL-MCNC: 8.9 MG/DL (ref 8.5–10.5)
CHLORIDE SERPL-SCNC: 102 MMOL/L (ref 96–112)
CO2 SERPL-SCNC: 20 MMOL/L (ref 20–33)
CREAT SERPL-MCNC: 0.75 MG/DL (ref 0.5–1.4)
GLUCOSE SERPL-MCNC: 93 MG/DL (ref 65–99)
POTASSIUM SERPL-SCNC: 3.8 MMOL/L (ref 3.6–5.5)
SODIUM SERPL-SCNC: 138 MMOL/L (ref 135–145)

## 2020-05-16 PROCEDURE — 80048 BASIC METABOLIC PNL TOTAL CA: CPT

## 2020-05-16 PROCEDURE — 36415 COLL VENOUS BLD VENIPUNCTURE: CPT

## 2020-05-18 ENCOUNTER — HOSPITAL ENCOUNTER (OUTPATIENT)
Dept: RADIOLOGY | Facility: MEDICAL CENTER | Age: 75
End: 2020-05-18
Attending: INTERNAL MEDICINE
Payer: COMMERCIAL

## 2020-05-18 ENCOUNTER — HOSPITAL ENCOUNTER (OUTPATIENT)
Dept: RADIATION ONCOLOGY | Facility: MEDICAL CENTER | Age: 75
End: 2020-05-31
Attending: RADIOLOGY
Payer: COMMERCIAL

## 2020-05-18 VITALS
WEIGHT: 163.2 LBS | OXYGEN SATURATION: 97 % | HEART RATE: 80 BPM | HEIGHT: 63 IN | TEMPERATURE: 98.3 F | BODY MASS INDEX: 28.92 KG/M2 | SYSTOLIC BLOOD PRESSURE: 171 MMHG | DIASTOLIC BLOOD PRESSURE: 89 MMHG

## 2020-05-18 DIAGNOSIS — C34.90 SMALL CELL CARCINOMA OF LUNG (HCC): ICD-10-CM

## 2020-05-18 DIAGNOSIS — C34.90 SMALL CELL LUNG CANCER (HCC): ICD-10-CM

## 2020-05-18 PROCEDURE — 99205 OFFICE O/P NEW HI 60 MIN: CPT | Performed by: RADIOLOGY

## 2020-05-18 PROCEDURE — 700117 HCHG RX CONTRAST REV CODE 255: Performed by: INTERNAL MEDICINE

## 2020-05-18 PROCEDURE — 99214 OFFICE O/P EST MOD 30 MIN: CPT | Performed by: RADIOLOGY

## 2020-05-18 PROCEDURE — 70553 MRI BRAIN STEM W/O & W/DYE: CPT

## 2020-05-18 PROCEDURE — A9576 INJ PROHANCE MULTIPACK: HCPCS | Performed by: INTERNAL MEDICINE

## 2020-05-18 RX ADMIN — GADOTERIDOL 15 ML: 279.3 INJECTION, SOLUTION INTRAVENOUS at 14:03

## 2020-05-18 ASSESSMENT — PAIN SCALES - GENERAL
PAINLEVEL: NO PAIN
PAINLEVEL: NO PAIN

## 2020-05-18 NOTE — NON-PROVIDER
"Patient was seen today in clinic with Dr. William Cruz for consultation.  Vitals signs and weight were obtained and pain assessment was completed.  Allergies and medications were reviewed with the patient.  Review of systems completed.     Vitals/Pain:  Vitals:    05/18/20 1425   BP: (!) 171/89   Pulse: 80   Temp: 36.8 °C (98.3 °F)   SpO2: 97%   Weight: 74 kg (163 lb 3.2 oz)   Height: 1.6 m (5' 3\")   Pain Score: No pain        Allergies:   Flu virus vaccine    Current Medications:  Current Outpatient Medications   Medication Sig Dispense Refill   • Cholecalciferol (VITAMIN D) 50 MCG (2000 UT) Cap Take 4,000 Units by mouth every day.     • Pseudoeph-Doxylamine-DM-APAP (NYQUIL PO) Take  by mouth as needed. Takes prn for sleep approx twice a month     • atorvastatin (LIPITOR) 10 MG Tab Take 10 mg by mouth every day. Takes in the afternoon     • denosumab (PROLIA) 60 MG/ML Solution Prefilled Syringe Inject 60 mg as instructed Once. Twice a year     • anastrozole (ARIMIDEX) 1 MG TABS Take 1 mg by mouth every day. Takes in the afternoon       No current facility-administered medications for this encounter.          PCP:  Babita Jensen R.N.  "

## 2020-05-18 NOTE — CONSULTS
RADIATION ONCOLOGY CONSULT    DATE OF SERVICE: 5/18/2020    IDENTIFICATION: 75 y.o. with limited stage SCLC of right hilum  Visit Diagnoses     ICD-10-CM   1. Small cell carcinoma of lung (HCC)  C34.90   2. Small cell lung cancer (HCC)  C34.90      Small cell lung cancer (HCC)  Staging form: Lung, AJCC 8th Edition  - Clinical stage from 5/18/2020: Stage IIB (cT1c, cN1, cM0) - Signed by William Cruz M.D. on 5/18/2020  Type of lung cancer: Small cell lung cancer     She is here at the kind request of Dr. Foster     HISTORY OF PRESENT ILLNESS:   Patient originally presented with abnormal CT scan finding in July 18 2018 from her breast cancer surveillance.  It was grossly stable until December 11, 2019 which showed slight increase in size of right hilar bunny tissue now measuring 2.4 x 1.5 cm with no evidence of primary small cell lung cancer.  Patient had her screening mammogram December 31, 2019 which showed no evidence of latency.  Patient was seen by Dr. Crain in pulmonary who ordered a PET CT scan in April 6, 2020 which showed FDG avid right hilar lymph node concerning for metastatic disease.  Patient underwent NIKOLAS guided biopsy by Dr. Foster with 11 R and 10 R sampled showing small cell lung cancer TTF-1 positive synaptophysin positive and chromogranin positive with KATHERIN-3 negative.  MRI brain was performed today May 18, 2020 and per my read shows no evidence of brain metastasis.  She is currently asymptomatic without any pain and is not on oxygen she denies any shortness of breath but does have a clear cough with no hemoptysis.    PAST MEDICAL HISTORY:   Past Medical History:   Diagnosis Date   • Breast cancer (HCC)    • Cancer (HCC) 2011    right breast   • COPD (chronic obstructive pulmonary disease) (HCC)    • Cough    • Dental disorder     upper and lower dentures   • EMPHYSEMA    • High cholesterol    • Pain     occasional breast pain   • Pneumonia 1948   • Renal disorder 1997    hx of stones   • Renal  disorder     stones and cyst; 4/29/20 pt reports unaware of renal cyst    • S/P radiation therapy     for breast cancer 2011 @ [facility] Dr. Mccartney   • Small cell lung cancer (HCC)    • Snoring    • Sputum production    • Urinary incontinence        PAST SURGICAL HISTORY:  Past Surgical History:   Procedure Laterality Date   • PB BRONCHOSCOPY,DIAGNOSTIC  5/7/2020    Procedure: BRONCHOSCOPY-FIBER OPTIC WITH POSSIBLE WASH, BRUSH, BROCHOALVEOLAR LAVAGE, BIOPSY FINE NEEDLE ASPIRATION;  Surgeon: Peter Foster M.D.;  Location: SURGERY [facility];  Service: Pulmonary   • ENDOBRONCHIAL US ADD-ON  5/7/2020    Procedure: ENDOBRONCHIAL ULTRASOUND (EBUS);  Surgeon: Peter Foster M.D.;  Location: [facility];  Service: Pulmonary   • COLONOSCOPY WITH BIOPSY  3/15/2012    Performed by RC FANG at SURGERY [facility]   • BREAST BIOPSY  8/23/2011    Performed by MOLLY PARRA at SURGERY [facility] DAY [facility]   • NODE BIOPSY SENTINEL  8/8/2011    Performed by MOLLY PARRA at SURGERY SAME DAY [facility]   • LUMPECTOMY  8/8/2011    Performed by MOLLY PARRA at SURGERY SAME DAY [facility]   • TONSILLECTOMY  1972   • PB RADIATION THERAPY PLAN SIMPLE         CURRENT MEDICATIONS:  Current Outpatient Medications   Medication Sig Dispense Refill   • Cholecalciferol (VITAMIN D) 50 MCG (2000 UT) Cap Take 4,000 Units by mouth every day.     • Pseudoeph-Doxylamine-DM-APAP (NYQUIL PO) Take  by mouth as needed. Takes prn for sleep approx twice a month     • atorvastatin (LIPITOR) 10 MG Tab Take 10 mg by mouth every day. Takes in the afternoon     • denosumab (PROLIA) 60 MG/ML Solution Prefilled Syringe Inject 60 mg as instructed Once. Twice a year     • anastrozole (ARIMIDEX) 1 MG TABS Take 1 mg by mouth every day. Takes in the afternoon       No current facility-administered medications for this encounter.        ALLERGIES:    Flu virus vaccine    FAMILY HISTORY:    Mother and 2 sisters - lung  "cancer, 1 sister - unknown cancer.    SOCIAL HISTORY:     reports that she has been smoking cigarettes. She started smoking about 58 years ago. She has a 116.00 pack-year smoking history. She has never used smokeless tobacco. She reports current alcohol use. She reports that she does not use drugs.   Patient is , has 2 adopted children and lives in [place], [place]. 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.        REVIEW OF SYSTEMS:  Review of systems for today's date of service was reviewed and uploaded into the electronic medical record.     PAIN ASSESSMENT:  Pain Assessment 2020   Pain Assessment Denies Pain - -   Pain Score 0 0 0   Some recent data might be hidden       GYNECOLOGICAL STATUS:  : 0, Para: 0 and Number of Interrupted Pregnancies: 0   Patient has 3 adopted children.     HORMONE USE:  Contraceptive hormone use 0 years and Post-menopause use 0 years    PHYSICAL EXAM:   PERFORMANCE STATUS:  Karnofsky Score 2020   Karnofsky Score 100   Some recent data might be hidden     No flowsheet data found.  BP (!) 171/89   Pulse 80   Temp 36.8 °C (98.3 °F)   Ht 1.6 m (5' 3\")   Wt 74 kg (163 lb 3.2 oz)   SpO2 97%   BMI 28.91 kg/m²   Physical Exam  Vitals signs reviewed.   Constitutional:       Appearance: Normal appearance.   HENT:      Head: Normocephalic and atraumatic.      Nose: Nose normal.      Mouth/Throat:      Mouth: Mucous membranes are moist.   Eyes:      Pupils: Pupils are equal, round, and reactive to light.   Neck:      Musculoskeletal: Normal range of motion.   Cardiovascular:      Rate and Rhythm: Normal rate.   Pulmonary:      Effort: Pulmonary effort is normal.   Abdominal:      General: Abdomen is flat.   Musculoskeletal: Normal range of motion.   Skin:     General: Skin is warm.   Neurological:      General: No focal deficit present.      Mental Status: She is alert.   Psychiatric:         Mood and Affect: Mood normal. "           LABORATORY DATA:  Lab Results   Component Value Date/Time    WBC 7.2 07/27/2017 12:35 PM    RBC 4.94 07/27/2017 12:35 PM    HEMOGLOBIN 15.7 07/27/2017 12:35 PM    HEMATOCRIT 47.0 07/27/2017 12:35 PM    MCV 95.1 07/27/2017 12:35 PM    MCH 31.8 07/27/2017 12:35 PM    MCHC 33.4 (L) 07/27/2017 12:35 PM    RDW 47.4 07/27/2017 12:35 PM    PLATELETCT 199 07/27/2017 12:35 PM    MPV 10.0 07/27/2017 12:35 PM    NEUTSPOLYS 70.80 07/27/2017 12:35 PM    LYMPHOCYTES 22.10 07/27/2017 12:35 PM    MONOCYTES 5.80 07/27/2017 12:35 PM    EOSINOPHILS 0.70 07/27/2017 12:35 PM    BASOPHILS 0.30 07/27/2017 12:35 PM      Lab Results   Component Value Date/Time    SODIUM 138 05/16/2020 08:57 AM    POTASSIUM 3.8 05/16/2020 08:57 AM    CHLORIDE 102 05/16/2020 08:57 AM    CO2 20 05/16/2020 08:57 AM    GLUCOSE 93 05/16/2020 08:57 AM    BUN 13 05/16/2020 08:57 AM    CREATININE 0.75 05/16/2020 08:57 AM         RADIOLOGY DATA:  [id number] Base To Mid-thigh    Result Date: 4/6/2020  1.  FDG avid right hilar lymph node is concerning for metastatic disease. 2.  Postoperative changes in the right breast with mild degenerative activity, likely inflammatory.      IMPRESSION:    A 75 y.o. with limited stage SCLC of right hilum  Visit Diagnoses     ICD-10-CM   1. Small cell carcinoma of lung (HCC)  C34.90   2. Small cell lung cancer (HCC)  C34.90     Small cell lung cancer (HCC)  Staging form: Lung, AJCC 8th Edition  - Clinical stage from 5/18/2020: Stage IIB (cT1c, cN1, cM0) - Signed by William Cruz M.D. on 5/18/2020  Type of lung cancer: Small cell lung cancer        RECOMMENDATIONS:   I discussed the role of chemoradiation therapy for the treatment of limited stage small cell lung cancer.  I explained that chemotherapy is typically started and radiation is given with the second cycle of chemotherapy for a total of 3 weeks twice a day 45 Lee and 30 treatments twice daily.  I explained that chemotherapy is given in typically 4 cycles  every 3 weeks.  I discussed our ongoing immunotherapy clinical trial which is randomizing patients to atezolizumab concurrently with radiation and patient is interested in trial.  I discussed risks and benefits and patient is consentable.  She will see Dr. Alcantar in the near future to discuss options.  I have ordered her a PET CT scan and CT chest abdomen pelvis contrast for clinical trial eligibility purposes as well as a more up-to-date picture given that PET scan from prior is over 6 weeks old at this time.  Patient will return to follow-up in 3 weeks for mapping scan.  I have also referred him back to Dr. Foster for PFTs for clinical trial.    Thank you for the opportunity to participate in her care.  If any questions or comments, please do not hesitate in calling.    Orders Placed This Encounter   • CT-CHEST,ABDOMEN,PELVIS WITH   • REFERRAL TO ONCOLOGY NURSE NAVIGATOR This patient has a screening score of (must be 6 or above): 0     CC: Dr. Foster, Dr. Alcantar

## 2020-05-19 DIAGNOSIS — J43.9 PULMONARY EMPHYSEMA, UNSPECIFIED EMPHYSEMA TYPE (HCC): ICD-10-CM

## 2020-05-19 DIAGNOSIS — C34.90 SMALL CELL LUNG CANCER (HCC): ICD-10-CM

## 2020-05-21 ENCOUNTER — NON-PROVIDER VISIT (OUTPATIENT)
Dept: PULMONOLOGY | Facility: HOSPICE | Age: 75
End: 2020-05-21
Attending: INTERNAL MEDICINE
Payer: COMMERCIAL

## 2020-05-21 VITALS — BODY MASS INDEX: 28.87 KG/M2 | WEIGHT: 163 LBS

## 2020-05-21 DIAGNOSIS — J43.9 PULMONARY EMPHYSEMA, UNSPECIFIED EMPHYSEMA TYPE (HCC): ICD-10-CM

## 2020-05-21 DIAGNOSIS — C34.90 SMALL CELL LUNG CANCER (HCC): ICD-10-CM

## 2020-05-21 PROCEDURE — 94726 PLETHYSMOGRAPHY LUNG VOLUMES: CPT | Performed by: INTERNAL MEDICINE

## 2020-05-21 PROCEDURE — 94060 EVALUATION OF WHEEZING: CPT | Performed by: INTERNAL MEDICINE

## 2020-05-21 PROCEDURE — 94729 DIFFUSING CAPACITY: CPT | Performed by: INTERNAL MEDICINE

## 2020-05-21 ASSESSMENT — PULMONARY FUNCTION TESTS
FEV1/FVC_PERCENT_PREDICTED: 91
FVC_LLN: 2.15
FEV1/FVC_PERCENT_PREDICTED: 77
FEV1/FVC: 70
FEV1_LLN: 1.66
FEV1_PERCENT_CHANGE: 2
FEV1/FVC_PREDICTED: 78
FEV1: 1.6
FVC: 2.33
FEV1_PREDICTED: 1.99
FEV1/FVC_PERCENT_PREDICTED: 90
FVC_PREDICTED: 2.58
FEV1_PERCENT_PREDICTED: 82
FEV1: 1.64
FEV1/FVC: 70
FVC_PERCENT_PREDICTED: 88
FVC_PERCENT_PREDICTED: 90
FVC: 2.28
FEV1_PERCENT_PREDICTED: 80
FEV1/FVC_PERCENT_PREDICTED: 91
FEV1_PERCENT_CHANGE: 2
FEV1/FVC_PERCENT_CHANGE: 0
FEV1/FVC_PERCENT_CHANGE: 100
FEV1/FVC: 70.39
FEV1/FVC_PERCENT_LLN: 65
FEV1/FVC: 70
FEV1/FVC_PERCENT_PREDICTED: 89

## 2020-05-21 NOTE — PROCEDURES
Technician: Lanny Yanez RRT   Good patient effort & cooperation.  The results of this test meet the ATS/ERS standards for acceptability & reproducibility.  Test was performed on the Red Aril Body Plethysmograph-Elite DX system.  Predicted equations for Spirometry are GLI-2012, ITS for lung volumes, and GLI-2017 for DLCO.  The DLCO was uncorrected for Hgb.  A bronchodilator of Ventolin HFA -2puffs via spacer administered.  DLCO performed during dilation period.    The FVC is 2.33 L or 90%, FEV1 is 1.64 L or 82%, FEV1/FVC: 70%.  Total lung capacity: 96%.  DLCO: 73%.  No bronchodilator response.    Interpretation;   PFTs are consistent with mild COPD.

## 2020-05-28 ENCOUNTER — HOSPITAL ENCOUNTER (EMERGENCY)
Facility: MEDICAL CENTER | Age: 75
End: 2020-05-28
Payer: COMMERCIAL

## 2020-05-29 ENCOUNTER — OFFICE VISIT (OUTPATIENT)
Dept: ADMISSIONS | Facility: MEDICAL CENTER | Age: 75
End: 2020-05-29
Attending: INTERNAL MEDICINE
Payer: COMMERCIAL

## 2020-05-29 DIAGNOSIS — Z01.812 PRE-OPERATIVE LABORATORY EXAMINATION: ICD-10-CM

## 2020-05-29 LAB — COVID ORDER STATUS COVID19: NORMAL

## 2020-05-29 PROCEDURE — C9803 HOPD COVID-19 SPEC COLLECT: HCPCS

## 2020-05-29 RX ORDER — SODIUM CHLORIDE 9 MG/ML
INJECTION, SOLUTION INTRAVENOUS CONTINUOUS
Status: CANCELLED | OUTPATIENT
Start: 2020-06-02

## 2020-05-29 SDOH — HEALTH STABILITY: MENTAL HEALTH: HOW OFTEN DO YOU HAVE A DRINK CONTAINING ALCOHOL?: 4 OR MORE TIMES A WEEK

## 2020-05-29 SDOH — HEALTH STABILITY: MENTAL HEALTH: HOW MANY STANDARD DRINKS CONTAINING ALCOHOL DO YOU HAVE ON A TYPICAL DAY?: 1 OR 2

## 2020-06-01 ENCOUNTER — HOSPITAL ENCOUNTER (OUTPATIENT)
Dept: RADIATION ONCOLOGY | Facility: MEDICAL CENTER | Age: 75
End: 2020-06-30
Attending: RADIOLOGY
Payer: COMMERCIAL

## 2020-06-01 LAB
SARS-COV-2 RNA RESP QL NAA+PROBE: NOT DETECTED
SPECIMEN SOURCE: NORMAL

## 2020-06-02 ENCOUNTER — APPOINTMENT (OUTPATIENT)
Dept: RADIOLOGY | Facility: MEDICAL CENTER | Age: 75
End: 2020-06-02
Attending: INTERNAL MEDICINE
Payer: COMMERCIAL

## 2020-06-02 ENCOUNTER — HOSPITAL ENCOUNTER (OUTPATIENT)
Facility: MEDICAL CENTER | Age: 75
End: 2020-06-02
Attending: INTERNAL MEDICINE | Admitting: INTERNAL MEDICINE
Payer: COMMERCIAL

## 2020-06-02 VITALS
HEIGHT: 63 IN | WEIGHT: 162 LBS | DIASTOLIC BLOOD PRESSURE: 74 MMHG | BODY MASS INDEX: 28.7 KG/M2 | HEART RATE: 74 BPM | SYSTOLIC BLOOD PRESSURE: 123 MMHG | OXYGEN SATURATION: 92 %

## 2020-06-02 DIAGNOSIS — C50.919 MALIGNANT NEOPLASM OF FEMALE BREAST, UNSPECIFIED ESTROGEN RECEPTOR STATUS, UNSPECIFIED LATERALITY, UNSPECIFIED SITE OF BREAST (HCC): ICD-10-CM

## 2020-06-02 PROCEDURE — 700111 HCHG RX REV CODE 636 W/ 250 OVERRIDE (IP)

## 2020-06-02 PROCEDURE — 99153 MOD SED SAME PHYS/QHP EA: CPT

## 2020-06-02 PROCEDURE — 700111 HCHG RX REV CODE 636 W/ 250 OVERRIDE (IP): Performed by: RADIOLOGY

## 2020-06-02 PROCEDURE — 700101 HCHG RX REV CODE 250

## 2020-06-02 RX ORDER — MIDAZOLAM HYDROCHLORIDE 1 MG/ML
.5-2 INJECTION INTRAMUSCULAR; INTRAVENOUS PRN
Status: DISCONTINUED | OUTPATIENT
Start: 2020-06-02 | End: 2020-06-02 | Stop reason: HOSPADM

## 2020-06-02 RX ORDER — SODIUM CHLORIDE 9 MG/ML
INJECTION, SOLUTION INTRAVENOUS CONTINUOUS
Status: DISCONTINUED | OUTPATIENT
Start: 2020-06-02 | End: 2020-06-02 | Stop reason: HOSPADM

## 2020-06-02 RX ORDER — CEFAZOLIN SODIUM 2 G/100ML
2 INJECTION, SOLUTION INTRAVENOUS ONCE
Status: COMPLETED | OUTPATIENT
Start: 2020-06-02 | End: 2020-06-02

## 2020-06-02 RX ORDER — MIDAZOLAM HYDROCHLORIDE 1 MG/ML
INJECTION INTRAMUSCULAR; INTRAVENOUS
Status: COMPLETED
Start: 2020-06-02 | End: 2020-06-02

## 2020-06-02 RX ORDER — HYDROMORPHONE HYDROCHLORIDE 1 MG/ML
0.25 INJECTION, SOLUTION INTRAMUSCULAR; INTRAVENOUS; SUBCUTANEOUS
Status: DISCONTINUED | OUTPATIENT
Start: 2020-06-02 | End: 2020-06-02 | Stop reason: HOSPADM

## 2020-06-02 RX ORDER — SODIUM CHLORIDE 9 MG/ML
500 INJECTION, SOLUTION INTRAVENOUS
Status: DISCONTINUED | OUTPATIENT
Start: 2020-06-02 | End: 2020-06-02 | Stop reason: HOSPADM

## 2020-06-02 RX ORDER — CEFAZOLIN SODIUM 1 G/3ML
INJECTION, POWDER, FOR SOLUTION INTRAMUSCULAR; INTRAVENOUS
Status: COMPLETED
Start: 2020-06-02 | End: 2020-06-02

## 2020-06-02 RX ORDER — ONDANSETRON 2 MG/ML
INJECTION INTRAMUSCULAR; INTRAVENOUS
Status: COMPLETED
Start: 2020-06-02 | End: 2020-06-02

## 2020-06-02 RX ORDER — ONDANSETRON 2 MG/ML
4 INJECTION INTRAMUSCULAR; INTRAVENOUS EVERY 8 HOURS PRN
Status: DISCONTINUED | OUTPATIENT
Start: 2020-06-02 | End: 2020-06-02 | Stop reason: HOSPADM

## 2020-06-02 RX ORDER — OXYCODONE HYDROCHLORIDE 5 MG/1
2.5 TABLET ORAL
Status: DISCONTINUED | OUTPATIENT
Start: 2020-06-02 | End: 2020-06-02 | Stop reason: HOSPADM

## 2020-06-02 RX ORDER — ONDANSETRON 2 MG/ML
4 INJECTION INTRAMUSCULAR; INTRAVENOUS PRN
Status: DISCONTINUED | OUTPATIENT
Start: 2020-06-02 | End: 2020-06-02 | Stop reason: HOSPADM

## 2020-06-02 RX ORDER — LIDOCAINE HYDROCHLORIDE AND EPINEPHRINE 10; 10 MG/ML; UG/ML
INJECTION, SOLUTION INFILTRATION; PERINEURAL
Status: COMPLETED
Start: 2020-06-02 | End: 2020-06-02

## 2020-06-02 RX ADMIN — MIDAZOLAM HYDROCHLORIDE 2 MG: 1 INJECTION, SOLUTION INTRAMUSCULAR; INTRAVENOUS at 14:32

## 2020-06-02 RX ADMIN — FENTANYL CITRATE 50 MCG: 50 INJECTION INTRAMUSCULAR; INTRAVENOUS at 14:41

## 2020-06-02 RX ADMIN — HEPARIN 500 UNITS: 100 SYRINGE at 15:00

## 2020-06-02 RX ADMIN — LIDOCAINE HYDROCHLORIDE,EPINEPHRINE BITARTRATE 20 ML: 10; .01 INJECTION, SOLUTION INFILTRATION; PERINEURAL at 14:36

## 2020-06-02 RX ADMIN — ONDANSETRON 4 MG: 2 INJECTION INTRAMUSCULAR; INTRAVENOUS at 14:30

## 2020-06-02 RX ADMIN — CEFAZOLIN 2 G: 330 INJECTION, POWDER, FOR SOLUTION INTRAMUSCULAR; INTRAVENOUS at 14:27

## 2020-06-02 RX ADMIN — FENTANYL CITRATE 50 MCG: 50 INJECTION INTRAMUSCULAR; INTRAVENOUS at 14:33

## 2020-06-02 RX ADMIN — FENTANYL CITRATE 50 MCG: 50 INJECTION, SOLUTION INTRAMUSCULAR; INTRAVENOUS at 14:33

## 2020-06-02 RX ADMIN — MIDAZOLAM HYDROCHLORIDE 1 MG: 1 INJECTION, SOLUTION INTRAMUSCULAR; INTRAVENOUS at 14:44

## 2020-06-02 NOTE — DISCHARGE INSTRUCTIONS
Implanted Port Insertion  An implanted port is a central line that has a round shape and is placed under the skin. It is used as a long-term IV access for:   · Medicines, such as chemotherapy.    · Fluids.    · Liquid nutrition, such as total parenteral nutrition (TPN).    · Blood samples.    LET YOUR HEALTH CARE PROVIDER KNOW ABOUT:  · Allergies to food or medicine.    · Medicines taken, including vitamins, herbs, eye drops, creams, and over-the-counter medicines.    · Any allergies to heparin.  · Use of steroids (by mouth or creams).    · Previous problems with anesthetics or numbing medicines.    · History of bleeding problems or blood clots.    · Previous surgery.    · Other health problems, including diabetes and kidney problems.    · Possibility of pregnancy, if this applies.  RISKS AND COMPLICATIONS  Generally, this is a safe procedure. However, as with any procedure, problems can occur. Possible problems include:  · Damage to the blood vessel, bruising, or bleeding at the puncture site.    · Infection.  · Blood clot in the vessel that the port is in.  · Breakdown of the skin over your port.  · Very rarely a person may develop a condition called a pneumothorax, a collection of air in the chest that may cause one of the lungs to collapse. The placement of these catheters with the appropriate imaging guidance significantly decreases the risk of a pneumothorax.    BEFORE THE PROCEDURE   · Your health care provider may want you to have blood tests. These tests can help tell how well your kidneys and liver are working. They can also show how well your blood clots.    · If you take blood thinners (anticoagulant medicines), ask your health care provider when you should stop taking them.    · Make arrangements for someone to drive you home. This is necessary if you have been sedated for your procedure.    PROCEDURE   Port insertion usually takes about 30-45 minutes.   · An IV needle will be inserted in your arm.  Medicine for pain and medicine to help relax you (sedative) will flow directly into your body through this needle.    · You will lie on an exam table, and you will be connected to monitors to keep track of your heart rate, blood pressure, and breathing throughout the procedure.  · An oxygen monitoring device may be attached to your finger. Oxygen will be given.    · Everything will be kept as germ free (sterile) as possible during the procedure. The skin near the point of the incision will be cleansed with antiseptic, and the area will be draped with sterile towels. The skin and deeper tissues over the port area will be made numb with a local anesthetic.  · Two small cuts (incisions) will be made in the skin to insert the port. One will be made in the neck to obtain access to the vein where the catheter will lie.    · Because the port reservoir will be placed under the skin, a small skin incision will be made in the upper chest, and a small pocket for the port will be made under the skin. The catheter that will be connected to the port tunnels to a large central vein in the chest. A small, raised area will remain on your body at the site of the reservoir when the procedure is complete.  · The port placement will be done under imaging guidance to ensure the proper placement.    · The reservoir has a silicone covering that can be punctured with a special needle.    · The port will be flushed with normal saline, and blood will be drawn to make sure it is working properly.  · There will be nothing remaining outside the skin when the procedure is finished.    · Incisions will be held together by stitches, surgical glue, or a special tape.  AFTER THE PROCEDURE  · You will stay in a recovery area until the anesthesia has worn off. Your blood pressure and pulse will be checked.  · A final chest X-ray will be taken to check the placement of the port and to ensure that there is no injury to your lung.  This information is not  intended to replace advice given to you by your health care provider. Make sure you discuss any questions you have with your health care provider.  Document Released: 10/08/2014 Document Revised: 01/08/2016 Document Reviewed: 10/08/2014  [employer] Interactive Patient Education © 2017 [employer]        Wound Care, Adult  Taking care of your wound properly can help to prevent pain and infection. It can also help your wound to heal more quickly.  How is this treated?  Wound care  Follow instructions from your health care provider about how to take care of your wound. Make sure you:  Wash your hands with soap and water before you change the bandage (dressing). If soap and water are not available, use hand .  Change your dressing as told by your health care provider.  Leave stitches (sutures), skin glue, or adhesive strips in place. These skin closures may need to stay in place for 2 weeks or longer. If adhesive strip edges start to loosen and curl up, you may trim the loose edges. Do not remove adhesive strips completely unless your health care provider tells you to do that.  Check your wound area every day for signs of infection. Check for:  More redness, swelling, or pain.  More fluid or blood.  Warmth.  Pus or a bad smell.  Ask your health care provider if you should clean the wound with mild soap and water. Doing this may include:  Using a clean towel to pat the wound dry after cleaning it. Do not rub or scrub the wound.  Applying a cream or ointment. Do this only as told by your health care provider.  Covering the incision with a clean dressing.  Ask your health care provider when you can leave the wound uncovered.  Medicines   If you were prescribed an antibiotic medicine, cream, or ointment, take or use the antibiotic as told by your health care provider. Do not stop taking or using the antibiotic even if your condition improves.  Take over-the-counter and prescription medicines only as told by your  health care provider. If you were prescribed pain medicine, take it at least 30 minutes before doing any wound care or as told by your health care provider.  General instructions  Return to your normal activities as told by your health care provider. Ask your health care provider what activities are safe.  Do not scratch or pick at the wound.  Keep all follow-up visits as told by your health care provider. This is important.  Eat a diet that includes protein, vitamin A, vitamin C, and other nutrient-rich foods. These help the wound heal:  Protein-rich foods include meat, dairy, beans, nuts, and other sources.  Vitamin A-rich foods include carrots and dark green, leafy vegetables.  Vitamin C-rich foods include citrus, tomatoes, and other fruits and vegetables.  Nutrient-rich foods have protein, carbohydrates, fat, vitamins, or minerals. Eat a variety of healthy foods including vegetables, fruits, and whole grains.  Contact a health care provider if:  You received a tetanus shot and you have swelling, severe pain, redness, or bleeding at the injection site.  Your pain is not controlled with medicine.  You have more redness, swelling, or pain around the wound.  You have more fluid or blood coming from the wound.  Your wound feels warm to the touch.  You have pus or a bad smell coming from the wound.  You have a fever or chills.  You are nauseous or you vomit.  You are dizzy.  Get help right away if:  You have a red streak going away from your wound.  The edges of the wound open up and separate.  Your wound is bleeding and the bleeding does not stop with gentle pressure.  You have a rash.  You faint.  You have trouble breathing.  This information is not intended to replace advice given to you by your health care provider. Make sure you discuss any questions you have with your health care provider.  Document Released: 09/26/2009 Document Revised: 08/16/2017 Document Reviewed: 07/04/2017  [employer] Interactive Patient  Education © 2017 [employer]      Moderate Conscious Sedation, Adult  Sedation is the use of medicines to promote relaxation and relieve discomfort and anxiety. Moderate conscious sedation is a type of sedation. Under moderate conscious sedation, you are less alert than normal, but you are still able to respond to instructions, touch, or both.  Moderate conscious sedation is used during short medical and dental procedures. It is milder than deep sedation, which is a type of sedation under which you cannot be easily woken up. It is also milder than general anesthesia, which is the use of medicines to make you unconscious. Moderate conscious sedation allows you to return to your regular activities sooner.  Tell a health care provider about:  · Any allergies you have.  · All medicines you are taking, including vitamins, herbs, eye drops, creams, and over-the-counter medicines.  · Use of steroids (by mouth or creams).  · Any problems you or family members have had with sedatives and anesthetic medicines.  · Any blood disorders you have.  · Any surgeries you have had.  · Any medical conditions you have, such as sleep apnea.  · Whether you are pregnant or may be pregnant.  · Any use of cigarettes, alcohol, marijuana, or street drugs.  What are the risks?  Generally, this is a safe procedure. However, problems may occur, including:  · Getting too much medicine (oversedation).  · Nausea.  · Allergic reaction to medicines.  · Trouble breathing. If this happens, a breathing tube may be used to help with breathing. It will be removed when you are awake and breathing on your own.  · Heart trouble.  · Lung trouble.  What happens before the procedure?  Staying hydrated   Follow instructions from your health care provider about hydration, which may include:  · Up to 2 hours before the procedure - you may continue to drink clear liquids, such as water, clear fruit juice, black coffee, and plain tea.  Eating and drinking  restrictions   Follow instructions from your health care provider about eating and drinking, which may include:  · 8 hours before the procedure - stop eating heavy meals or foods such as meat, fried foods, or fatty foods.  · 6 hours before the procedure - stop eating light meals or foods, such as toast or cereal.  · 6 hours before the procedure - stop drinking milk or drinks that contain milk.  · 2 hours before the procedure - stop drinking clear liquids.  Medicine   Ask your health care provider about:  · Changing or stopping your regular medicines. This is especially important if you are taking diabetes medicines or blood thinners.  · Taking medicines such as aspirin and ibuprofen. These medicines can thin your blood. Do not take these medicines before your procedure if your health care provider instructs you not to.  Tests and exams  · You will have a physical exam.  · You may have blood tests done to show:  ¨ How well your kidneys and liver are working.  ¨ How well your blood can clot.  General instructions  · Plan to have someone take you home from the hospital or clinic.  · If you will be going home right after the procedure, plan to have someone with you for 24 hours.  What happens during the procedure?  · An IV tube will be inserted into one of your veins.  · Medicine to help you relax (sedative) will be given through the IV tube.  · The medical or dental procedure will be performed.  What happens after the procedure?  · Your blood pressure, heart rate, breathing rate, and blood oxygen level will be monitored often until the medicines you were given have worn off.  · Do not drive for 24 hours.  This information is not intended to replace advice given to you by your health care provider. Make sure you discuss any questions you have with your health care provider.  Document Released: 09/12/2002 Document Revised: 05/23/2017 Document Reviewed: 04/08/2017  [employer] Interactive Patient Education © 2017 [employer]

## 2020-06-02 NOTE — PROGRESS NOTES
Right chest port placed by Dr Alonso in [facility]. Right neck puncture and right chest incision CDI with dressing in tact. Patient awake and appropriate, denies pain. Post procedure instructions reviewed with patient and daughter. Patient left, chatting pleasantly, ambulating well and accompanied by daughter.

## 2020-06-02 NOTE — OR SURGEON
Immediate Post- Operative Note    PostOp Diagnosis: VENOUS ACCESS REQUIRED FOR CHEMOTHERAPY. BREAST CA.      Procedure(s): RIGHT INTERNAL JUGULAR POWER PORT VENOUS ACCESS PLACEMENT WITH U/S AND FLUOROSCOPIC GUIDANCE      Estimated Blood Loss: <5CC        Complications: NONE          6/2/2020     3:01 PM     Gary Alonso M.D.

## 2020-06-02 NOTE — PROGRESS NOTES
Right chest port placement performed by Dr Alonso via right IJ access. Procedure BARs explained to patient by physician and consent obtained. Patient to procedure suite and assisted to table. Patient was monitored and assessed continuously throughout procedure; ETCO2 41-44 with consistent waveform. Port placed without complication. Right chest incision and right neck puncture sites CDI; sterile guaze/tegaderm dressing applied. Patient tolerated procedure quite well, was slightly drowsy but appropriately responsive afterward. Patient transferred to recovery bay in good condition.     [employer] Port #[id number] ref [id number] lot [id number]

## 2020-06-04 ENCOUNTER — HOSPITAL ENCOUNTER (OUTPATIENT)
Dept: RADIOLOGY | Facility: MEDICAL CENTER | Age: 75
End: 2020-06-04
Attending: RADIOLOGY
Payer: COMMERCIAL

## 2020-06-04 DIAGNOSIS — C34.90 MALIGNANT NEOPLASM OF LUNG, UNSPECIFIED LATERALITY, UNSPECIFIED PART OF LUNG (HCC): ICD-10-CM

## 2020-06-04 PROCEDURE — A9552 F18 FDG: HCPCS

## 2020-06-05 ENCOUNTER — HOSPITAL ENCOUNTER (OUTPATIENT)
Dept: RADIOLOGY | Facility: MEDICAL CENTER | Age: 75
End: 2020-06-05
Attending: RADIOLOGY
Payer: COMMERCIAL

## 2020-06-05 DIAGNOSIS — C34.90 SMALL CELL CARCINOMA OF LUNG (HCC): ICD-10-CM

## 2020-06-05 PROCEDURE — 71260 CT THORAX DX C+: CPT

## 2020-06-05 PROCEDURE — 700117 HCHG RX CONTRAST REV CODE 255: Performed by: RADIOLOGY

## 2020-06-05 RX ADMIN — IOHEXOL 25 ML: 240 INJECTION, SOLUTION INTRATHECAL; INTRAVASCULAR; INTRAVENOUS; ORAL at 11:30

## 2020-06-05 RX ADMIN — IOHEXOL 100 ML: 350 INJECTION, SOLUTION INTRAVENOUS at 11:30

## 2020-06-15 ENCOUNTER — APPOINTMENT (OUTPATIENT)
Dept: RADIATION ONCOLOGY | Facility: MEDICAL CENTER | Age: 75
End: 2020-06-15
Payer: COMMERCIAL

## 2020-06-15 PROCEDURE — 77290 THER RAD SIMULAJ FIELD CPLX: CPT | Performed by: RADIOLOGY

## 2020-06-15 PROCEDURE — 77334 RADIATION TREATMENT AID(S): CPT | Mod: 26 | Performed by: RADIOLOGY

## 2020-06-15 PROCEDURE — 77263 THER RADIOLOGY TX PLNG CPLX: CPT | Performed by: RADIOLOGY

## 2020-06-15 PROCEDURE — 77470 SPECIAL RADIATION TREATMENT: CPT | Mod: 26 | Performed by: RADIOLOGY

## 2020-06-15 PROCEDURE — 77334 RADIATION TREATMENT AID(S): CPT | Performed by: RADIOLOGY

## 2020-06-15 PROCEDURE — 77470 SPECIAL RADIATION TREATMENT: CPT | Performed by: RADIOLOGY

## 2020-06-25 PROCEDURE — 77293 RESPIRATOR MOTION MGMT SIMUL: CPT | Performed by: RADIOLOGY

## 2020-06-25 PROCEDURE — 77301 RADIOTHERAPY DOSE PLAN IMRT: CPT | Performed by: RADIOLOGY

## 2020-06-25 PROCEDURE — 77338 DESIGN MLC DEVICE FOR IMRT: CPT | Performed by: RADIOLOGY

## 2020-06-25 PROCEDURE — 77300 RADIATION THERAPY DOSE PLAN: CPT | Mod: 26 | Performed by: RADIOLOGY

## 2020-06-25 PROCEDURE — 77300 RADIATION THERAPY DOSE PLAN: CPT | Performed by: RADIOLOGY

## 2020-06-25 PROCEDURE — 77338 DESIGN MLC DEVICE FOR IMRT: CPT | Mod: 26 | Performed by: RADIOLOGY

## 2020-06-25 PROCEDURE — 77293 RESPIRATOR MOTION MGMT SIMUL: CPT | Mod: 26 | Performed by: RADIOLOGY

## 2020-06-25 PROCEDURE — 77301 RADIOTHERAPY DOSE PLAN IMRT: CPT | Mod: 26 | Performed by: RADIOLOGY

## 2020-07-01 ENCOUNTER — HOSPITAL ENCOUNTER (OUTPATIENT)
Dept: RADIATION ONCOLOGY | Facility: MEDICAL CENTER | Age: 75
End: 2020-07-31
Attending: RADIOLOGY
Payer: COMMERCIAL

## 2020-07-01 ENCOUNTER — OFFICE VISIT (OUTPATIENT)
Dept: PULMONOLOGY | Facility: HOSPICE | Age: 75
End: 2020-07-01
Payer: COMMERCIAL

## 2020-07-01 VITALS
HEIGHT: 63 IN | TEMPERATURE: 98.5 F | WEIGHT: 159 LBS | DIASTOLIC BLOOD PRESSURE: 80 MMHG | SYSTOLIC BLOOD PRESSURE: 130 MMHG | BODY MASS INDEX: 28.17 KG/M2 | OXYGEN SATURATION: 97 % | HEART RATE: 76 BPM | RESPIRATION RATE: 16 BRPM

## 2020-07-01 DIAGNOSIS — R91.8 PULMONARY NODULES: ICD-10-CM

## 2020-07-01 DIAGNOSIS — C34.90 SMALL CELL LUNG CANCER (HCC): ICD-10-CM

## 2020-07-01 DIAGNOSIS — J44.89 COPD WITH ASTHMA (HCC): ICD-10-CM

## 2020-07-01 PROBLEM — J43.9 PULMONARY EMPHYSEMA (HCC): Status: RESOLVED | Noted: 2020-03-20 | Resolved: 2020-07-01

## 2020-07-01 PROCEDURE — 99214 OFFICE O/P EST MOD 30 MIN: CPT | Performed by: INTERNAL MEDICINE

## 2020-07-01 RX ORDER — LIDOCAINE AND PRILOCAINE 25; 25 MG/G; MG/G
CREAM TOPICAL
COMMUNITY
Start: 2020-06-05

## 2020-07-01 RX ORDER — NICOTINE 21 MG/24HR
PATCH, TRANSDERMAL 24 HOURS TRANSDERMAL
COMMUNITY
Start: 2020-06-17 | End: 2020-08-06

## 2020-07-01 RX ORDER — ONDANSETRON HYDROCHLORIDE 8 MG/1
TABLET, FILM COATED ORAL
COMMUNITY
Start: 2020-06-05 | End: 2020-08-06

## 2020-07-01 RX ORDER — PROCHLORPERAZINE MALEATE 10 MG
TABLET ORAL
COMMUNITY
Start: 2020-06-05 | End: 2020-08-06

## 2020-07-01 ASSESSMENT — PAIN SCALES - GENERAL: PAINLEVEL: NO PAIN

## 2020-07-01 NOTE — PROGRESS NOTES
Emily Barnes is a 75 y.o. 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 Patient was referred by her primary care doctor and oncologist.    History of Present Illness:      This lady comes in to follow-up on her recent biopsy, this was done by bronchoscopic E bus and revealed small cell malignancy.  She did tell me the last visit she thought she had lung cancer.    She remains upbeat, positive in her thinking, her daughter Estela is present and helps her with her appointments travel and scheduling.  Emily is going to have a complicated next few weeks, she is getting radiation therapy and chemotherapy, in combination and this starts on Monday the sixth.  I anticipate following her on a three-month basis, after treatment surveillance will continue, and in the meantime I reviewed her lung function testing with her, this was done in May anticipating possible need for a surgical approach and it actually showed fairly good lung function.  FEV1 was low normal at 1.6 L, 80% predicted.  Mild reduction of mid flows without bronchodilator response.  Normal lung volumes and very mild reduction of oxygen transfer.  This is encouraging, room air saturations today are 97%.    She has gone through 1 round of chemotherapy, has an interesting hat to cover her alopecia, but still smiles behind the mask.    Constitutional ROS: No unexpected change in weight, No unexplained fevers; hair loss as expected with chemotherapy  Eyes: No change in vision or blurring or double vision  Mouth/Throat ROS: No sore throat, No recent change in voice or hoarseness  Pulmonary ROS: See present history for pertinent positives  Cardiovascular ROS: No chest pain to suggest acute coronary syndrome  Gastrointestinal ROS: No abdominal pain to suggest peptic disease  Musculoskeletal/Extremities ROS: no acute artritis or unusual swelling  Hematologic/Lymphatic ROS: No easy bleeding or unusual lymph node  swelling  Neurologic ROS: No new or unusual weakness  Allergic/Immunologic: No  urticaria or allergic rash      Current Outpatient Medications   Medication Sig Dispense Refill   • ondansetron (ZOFRAN) 8 MG Tab TAKE 1 TABLET BY MOUTH TWICE DAILY AS NEEDED FOR NAUSEA     • prochlorperazine (COMPAZINE) 10 MG Tab TAKE 1 TABLET BY MOUTH EVERY 8 HOURS AS NEEDED FOR NAUSEA     • Cholecalciferol (VITAMIN D) 50 MCG (2000 UT) Cap Take 4,000 Units by mouth every day.     • Pseudoeph-Doxylamine-DM-APAP (NYQUIL PO) Take  by mouth as needed. Takes prn for sleep approx twice a month     • atorvastatin (LIPITOR) 10 MG Tab Take 10 mg by mouth every day. Takes in the afternoon     • denosumab (PROLIA) 60 MG/ML Solution Prefilled Syringe Inject 60 mg as instructed Once. Twice a year     • anastrozole (ARIMIDEX) 1 MG TABS Take 1 mg by mouth every day. Takes in the afternoon     • lidocaine-prilocaine (EMLA) 2.5-2.5 % Cream APPLY A SMALL AMOUNT OF CREAM TO PORT SITE AN HOUR PRIOR TO TREATMENT     • nicotine (NICODERM) 21 MG/24HR PATCH 24 HR APPLY ONE PATCH TOPICALLY TO CLEAN DRY SKIN EVERY 24 HOURS       No current facility-administered medications for this visit.        Social History     Tobacco Use   • Smoking status: Current Every Day Smoker     Packs/day: 2.00     Years: 58.00     Pack years: 116.00     Types: Cigarettes     Start date: 1962   • Smokeless tobacco: Never Used   Substance Use Topics   • Alcohol use: Yes     Frequency: 4 or more times a week     Drinks per session: 1 or 2     Comment: 4 per week   • Drug use: No        Past Medical History:   Diagnosis Date   • Anemia 1962   • Breast cancer (HCC)    • Cancer (HCC) 2011    right breast   • Cancer (HCC) 2020    lung    • COPD (chronic obstructive pulmonary disease) (HCC)    • Cough    • Dental disorder     upper and lower dentures   • EMPHYSEMA    • High cholesterol    • Pain     occasional breast pain   • Pneumonia 1948   • Renal disorder 1997    hx of stones   • Renal  "disorder     stones and cyst; 4/29/20 pt reports unaware of renal cyst    • S/P radiation therapy     for breast cancer 2011 @ [facility] Dr. Mccartney   • Small cell lung cancer (HCC)    • Snoring    • Sputum production    • Urinary incontinence        Past Surgical History:   Procedure Laterality Date   • PB BRONCHOSCOPY,DIAGNOSTIC  5/7/2020    Procedure: BRONCHOSCOPY-FIBER OPTIC WITH POSSIBLE WASH, BRUSH, BROCHOALVEOLAR LAVAGE, BIOPSY FINE NEEDLE ASPIRATION;  Surgeon: Peter Foster M.D.;  Location: [facility];  Service: Pulmonary   • ENDOBRONCHIAL US ADD-ON  5/7/2020    Procedure: ENDOBRONCHIAL ULTRASOUND (EBUS);  Surgeon: Peter Foster M.D.;  Location: [facility];  Service: Pulmonary   • COLONOSCOPY WITH BIOPSY  3/15/2012    Performed by RC FANG at [facility]   • BREAST BIOPSY  8/23/2011    Performed by MOLLY PARRA at SURGERY SAME DAY [facility] ORS   • NODE BIOPSY SENTINEL  8/8/2011    Performed by MOLLY PARRA at SURGERY SAME DAY [facility] ORS   • LUMPECTOMY  8/8/2011    Performed by MOLLY PARRA at SURGERY SAME DAY [facility] ORS   • TONSILLECTOMY  1972   • PB RADIATION THERAPY PLAN SIMPLE         Allergies: Flu virus vaccine    Family History   Problem Relation Age of Onset   • Stroke Other    • Heart Disease Other    • Cancer Mother         lung cancer   • Heart Disease Father    • Cancer Sister         lung cancer   • Cancer Sister         lung cancer   • Cancer Sister         unknown cancer       Physical Examination    Vitals:    07/01/20 1329   Height: 1.6 m (5' 3\")   Weight: 72.1 kg (159 lb)   Weight % change since last entry.: 0 %   BP: 130/80   Pulse: 76   BMI (Calculated): 28.17   Resp: 16   Temp: 36.9 °C (98.5 °F)   TempSrc: Tympanic       General Appearance: alert, no distress  Skin: Skin color, texture, turgor normal. No rashes or lesions.  Eyes: negative  Oropharynx: Lips, mucosa, and tongue normal. Teeth and gums normal. Oropharynx moist " and without lesion  Lungs: positive findings: Remarkably quiet and clear without wheezes or rhonchi  Heart: negative. RRR without murmur, gallop, or rubs.  No ectopy.  Abdomen: Abdomen soft, non-tender. . No masses,  No organomegaly  Extremities:  No deformities, edema, or skin discoloration  Joints: No acute arthritis  Peripheral Pulses:perfused  Neurologic: intact grossly  Alopecia    II (soft palate, uvula, fauces visible)    Imaging: CAT scan and imaging showed lung mass, see results of biopsy    PFTS: Mild airflow obstruction, no significant restriction, low normal oxygen transfer      Assessment and Plan  1. Small cell lung cancer (HCC)  On combined radiation and chemotherapy    2. Pulmonary nodule/R hilar mass  E bus biopsy revealed small cell malignancy    3. COPD with asthma (HCC)  No active bronchospasm, does not require inhalers      Followup Return in about 3 months (around 10/1/2020) for follow up visit with Dr. Jazmín Crain.

## 2020-07-01 NOTE — PATIENT INSTRUCTIONS
This lady comes in to follow-up on her recent biopsy, this was done by bronchoscopic E bus and revealed small cell malignancy.  She did tell me the last visit she thought she had lung cancer.    She remains upbeat, positive in her thinking, her daughter Estela is present and helps her with her appointments travel and scheduling.  Emily is going to have a complicated next few weeks, she is getting radiation therapy and chemotherapy, in combination and this starts on Monday the sixth.  I anticipate following her on a three-month basis, after treatment surveillance will continue, and in the meantime I reviewed her lung function testing with her, this was done in May anticipating possible need for a surgical approach and it actually showed fairly good lung function.  FEV1 was low normal at 1.6 L, 80% predicted.  Mild reduction of mid flows without bronchodilator response.  Normal lung volumes and very mild reduction of oxygen transfer.  This is encouraging, room air saturations today are 97%.    She has gone through 1 round of chemotherapy, has an interesting hat to cover her alopecia, but still smiles behind the mask.

## 2020-07-06 ENCOUNTER — APPOINTMENT (OUTPATIENT)
Dept: RADIATION ONCOLOGY | Facility: MEDICAL CENTER | Age: 75
End: 2020-07-06
Payer: COMMERCIAL

## 2020-07-06 LAB
CHEMOTHERAPY INFUSION START DATE: NORMAL
CHEMOTHERAPY INFUSION START DATE: NORMAL
CHEMOTHERAPY RECORDS: 1.5
CHEMOTHERAPY RECORDS: 1.5
CHEMOTHERAPY RECORDS: 4500
CHEMOTHERAPY RECORDS: 4500
CHEMOTHERAPY RECORDS: NORMAL
CHEMOTHERAPY RX CANCER: NORMAL
CHEMOTHERAPY RX CANCER: NORMAL
DATE 1ST CHEMO CANCER: NORMAL
DATE 1ST CHEMO CANCER: NORMAL
RAD ONC ARIA COURSE LAST TREATMENT DATE: NORMAL
RAD ONC ARIA COURSE LAST TREATMENT DATE: NORMAL
RAD ONC ARIA COURSE TREATMENT ELAPSED DAYS: NORMAL
RAD ONC ARIA COURSE TREATMENT ELAPSED DAYS: NORMAL
RAD ONC ARIA REFERENCE POINT DOSAGE GIVEN TO DATE: 1.5
RAD ONC ARIA REFERENCE POINT DOSAGE GIVEN TO DATE: 1.55
RAD ONC ARIA REFERENCE POINT DOSAGE GIVEN TO DATE: 3
RAD ONC ARIA REFERENCE POINT DOSAGE GIVEN TO DATE: 3.09
RAD ONC ARIA REFERENCE POINT ID: NORMAL
RAD ONC ARIA REFERENCE POINT SESSION DOSAGE GIVEN: 1.5
RAD ONC ARIA REFERENCE POINT SESSION DOSAGE GIVEN: 1.5
RAD ONC ARIA REFERENCE POINT SESSION DOSAGE GIVEN: 1.55
RAD ONC ARIA REFERENCE POINT SESSION DOSAGE GIVEN: 1.55

## 2020-07-06 PROCEDURE — 77386 HCHG IMRT DELIVERY COMPLEX: CPT | Mod: XE | Performed by: RADIOLOGY

## 2020-07-06 PROCEDURE — 77280 THER RAD SIMULAJ FIELD SMPL: CPT | Performed by: RADIOLOGY

## 2020-07-07 ENCOUNTER — APPOINTMENT (OUTPATIENT)
Dept: RADIATION ONCOLOGY | Facility: MEDICAL CENTER | Age: 75
End: 2020-07-07
Payer: COMMERCIAL

## 2020-07-07 LAB
CHEMOTHERAPY INFUSION START DATE: NORMAL
CHEMOTHERAPY INFUSION START DATE: NORMAL
CHEMOTHERAPY RECORDS: 1.5
CHEMOTHERAPY RECORDS: 1.5
CHEMOTHERAPY RECORDS: 4500
CHEMOTHERAPY RECORDS: 4500
CHEMOTHERAPY RECORDS: NORMAL
CHEMOTHERAPY RX CANCER: NORMAL
CHEMOTHERAPY RX CANCER: NORMAL
DATE 1ST CHEMO CANCER: NORMAL
DATE 1ST CHEMO CANCER: NORMAL
RAD ONC ARIA COURSE LAST TREATMENT DATE: NORMAL
RAD ONC ARIA COURSE LAST TREATMENT DATE: NORMAL
RAD ONC ARIA COURSE TREATMENT ELAPSED DAYS: NORMAL
RAD ONC ARIA COURSE TREATMENT ELAPSED DAYS: NORMAL
RAD ONC ARIA REFERENCE POINT DOSAGE GIVEN TO DATE: 4.5
RAD ONC ARIA REFERENCE POINT DOSAGE GIVEN TO DATE: 4.64
RAD ONC ARIA REFERENCE POINT DOSAGE GIVEN TO DATE: 6
RAD ONC ARIA REFERENCE POINT DOSAGE GIVEN TO DATE: 6.18
RAD ONC ARIA REFERENCE POINT ID: NORMAL
RAD ONC ARIA REFERENCE POINT SESSION DOSAGE GIVEN: 1.5
RAD ONC ARIA REFERENCE POINT SESSION DOSAGE GIVEN: 1.5
RAD ONC ARIA REFERENCE POINT SESSION DOSAGE GIVEN: 1.55
RAD ONC ARIA REFERENCE POINT SESSION DOSAGE GIVEN: 1.55

## 2020-07-07 PROCEDURE — 77014 PR CT GUIDANCE PLACEMENT RAD THERAPY FIELDS: CPT | Mod: 26,XE | Performed by: RADIOLOGY

## 2020-07-07 PROCEDURE — 77386 HCHG IMRT DELIVERY COMPLEX: CPT | Mod: XE | Performed by: RADIOLOGY

## 2020-07-07 PROCEDURE — 77336 RADIATION PHYSICS CONSULT: CPT | Performed by: RADIOLOGY

## 2020-07-07 PROCEDURE — 77014 PR CT GUIDANCE PLACEMENT RAD THERAPY FIELDS: CPT | Mod: 26 | Performed by: RADIOLOGY

## 2020-07-08 ENCOUNTER — APPOINTMENT (OUTPATIENT)
Dept: RADIATION ONCOLOGY | Facility: MEDICAL CENTER | Age: 75
End: 2020-07-08
Payer: COMMERCIAL

## 2020-07-08 VITALS
DIASTOLIC BLOOD PRESSURE: 96 MMHG | SYSTOLIC BLOOD PRESSURE: 183 MMHG | WEIGHT: 164.24 LBS | TEMPERATURE: 98.4 F | BODY MASS INDEX: 29.09 KG/M2 | OXYGEN SATURATION: 96 % | HEART RATE: 84 BPM

## 2020-07-08 LAB
CHEMOTHERAPY INFUSION START DATE: NORMAL
CHEMOTHERAPY INFUSION START DATE: NORMAL
CHEMOTHERAPY RECORDS: 1.5
CHEMOTHERAPY RECORDS: 1.5
CHEMOTHERAPY RECORDS: 4500
CHEMOTHERAPY RECORDS: 4500
CHEMOTHERAPY RECORDS: NORMAL
CHEMOTHERAPY RX CANCER: NORMAL
CHEMOTHERAPY RX CANCER: NORMAL
DATE 1ST CHEMO CANCER: NORMAL
DATE 1ST CHEMO CANCER: NORMAL
RAD ONC ARIA COURSE LAST TREATMENT DATE: NORMAL
RAD ONC ARIA COURSE LAST TREATMENT DATE: NORMAL
RAD ONC ARIA COURSE TREATMENT ELAPSED DAYS: NORMAL
RAD ONC ARIA COURSE TREATMENT ELAPSED DAYS: NORMAL
RAD ONC ARIA REFERENCE POINT DOSAGE GIVEN TO DATE: 7.5
RAD ONC ARIA REFERENCE POINT DOSAGE GIVEN TO DATE: 7.73
RAD ONC ARIA REFERENCE POINT DOSAGE GIVEN TO DATE: 9
RAD ONC ARIA REFERENCE POINT DOSAGE GIVEN TO DATE: 9.27
RAD ONC ARIA REFERENCE POINT ID: NORMAL
RAD ONC ARIA REFERENCE POINT SESSION DOSAGE GIVEN: 1.5
RAD ONC ARIA REFERENCE POINT SESSION DOSAGE GIVEN: 1.5
RAD ONC ARIA REFERENCE POINT SESSION DOSAGE GIVEN: 1.55
RAD ONC ARIA REFERENCE POINT SESSION DOSAGE GIVEN: 1.55

## 2020-07-08 PROCEDURE — 77427 RADIATION TX MANAGEMENT X5: CPT | Performed by: RADIOLOGY

## 2020-07-08 PROCEDURE — 77386 HCHG IMRT DELIVERY COMPLEX: CPT | Mod: XE | Performed by: RADIOLOGY

## 2020-07-08 PROCEDURE — 77014 PR CT GUIDANCE PLACEMENT RAD THERAPY FIELDS: CPT | Mod: 26,XE | Performed by: RADIOLOGY

## 2020-07-08 ASSESSMENT — PAIN SCALES - GENERAL: PAINLEVEL: NO PAIN

## 2020-07-08 NOTE — ON TREATMENT VISIT
ON TREATMENT  NOTE  RADIATION ONCOLOGY DEPARTMENT    Patient name:  Emily Barnes    Primary Physician:  Osei CABRERA M.D. MRN: [id number]  [facility]: [id number]   Referring physician:  Peter Foster M.D. : 1945, 75 y.o.     ENCOUNTER DATE:  20    DIAGNOSIS:  Small cell lung cancer (HCC)  Staging form: Lung, AJCC 8th Edition  - Clinical stage from 2020: Stage IIB (cT1c, cN1, cM0) - Signed by William Cruz M.D. on 2020  Type of lung cancer: Small cell lung cancer      TREATMENT SUMMARY:  Aria Treatment Information        Some values may be hidden. Unless noted otherwise, only the newest values recorded on each date are displayed.         Aria Treatment Summary 20   Course First Treatment Date 2020   Course Last Treatment Date 2020   R_Lung_Nodes Plan from Course C2_SCLC   Fraction 5 of 30   Elapsed Course Days 2 @    Prescribed Fraction Dose 150 cGy   Prescribed Total Dose 4,500 cGy   R_Lung_Nodes Reference Point from Course C2_SCLC   Elapsed Course Days 2 @ [id number]   Session Dose 150 cGy   Total Dose 750 cGy   R_Lung_Nodes CP Reference Point from Course C2_SCLC   Elapsed Course Days 2 @    Session Dose 155 cGy   Total Dose 773 cGy             SUBJECTIVE:  Well appearing      VITAL SIGNS:  KPS: 100, Fully active, able to carry on all pre-disease performed without restriction (ECOG equivalent 0)     Pain Assessment 2020   Pain Assessment - Denies Pain - -   Pain Score 0 0 0 0   Some recent data might be hidden          PHYSICAL EXAM:    Physical Exam  Constitutional:       General: She is not in acute distress.  Pulmonary:      Breath sounds: Normal air entry.   Skin:     Findings: No erythema.   Neurological:      Mental Status: She is alert.          No flowsheet data found.    CURRENT MEDICATIONS:    Current Outpatient Medications:   •  lidocaine-prilocaine (EMLA) 2.5-2.5 % Cream, APPLY A SMALL AMOUNT  OF CREAM TO PORT SITE AN HOUR PRIOR TO TREATMENT, Disp: , Rfl:   •  nicotine (NICODERM) 21 MG/24HR PATCH 24 HR, APPLY ONE PATCH TOPICALLY TO CLEAN DRY SKIN EVERY 24 HOURS, Disp: , Rfl:   •  ondansetron (ZOFRAN) 8 MG Tab, TAKE 1 TABLET BY MOUTH TWICE DAILY AS NEEDED FOR NAUSEA, Disp: , Rfl:   •  prochlorperazine (COMPAZINE) 10 MG Tab, TAKE 1 TABLET BY MOUTH EVERY 8 HOURS AS NEEDED FOR NAUSEA, Disp: , Rfl:   •  Cholecalciferol (VITAMIN D) 50 MCG (2000 UT) Cap, Take 4,000 Units by mouth every day., Disp: , Rfl:   •  Pseudoeph-Doxylamine-DM-APAP (NYQUIL PO), Take  by mouth as needed. Takes prn for sleep approx twice a month, Disp: , Rfl:   •  atorvastatin (LIPITOR) 10 MG Tab, Take 10 mg by mouth every day. Takes in the afternoon, Disp: , Rfl:   •  denosumab (PROLIA) 60 MG/ML Solution Prefilled Syringe, Inject 60 mg as instructed Once. Twice a year, Disp: , Rfl:   •  anastrozole (ARIMIDEX) 1 MG TABS, Take 1 mg by mouth every day. Takes in the afternoon, Disp: , Rfl:     LABORATORY DATA:   Lab Results   Component Value Date/Time    SODIUM 138 05/16/2020 08:57 AM    POTASSIUM 3.8 05/16/2020 08:57 AM    CHLORIDE 102 05/16/2020 08:57 AM    CO2 20 05/16/2020 08:57 AM    GLUCOSE 93 05/16/2020 08:57 AM    BUN 13 05/16/2020 08:57 AM    CREATININE 0.75 05/16/2020 08:57 AM         Lab Results   Component Value Date/Time    WBC 7.2 07/27/2017 12:35 PM    HEMOGLOBIN 15.7 07/27/2017 12:35 PM    HEMATOCRIT 47.0 07/27/2017 12:35 PM    MCV 95.1 07/27/2017 12:35 PM    PLATELETCT 199 07/27/2017 12:35 PM        RADIOLOGY DATA:  No results found.    IMPRESSION:  Small cell lung cancer (HCC)  Staging form: Lung, AJCC 8th Edition  - Clinical stage from 5/18/2020: Stage IIB (cT1c, cN1, cM0) - Signed by William Cruz M.D. on 5/18/2020  Type of lung cancer: Small cell lung cancer      PLAN:  No change in treatment plan    Disposition:  Treatment plan reviewed. Questions answered. Continue therapy outlined.     William Cruz M.D.    Orders  Placed This Encounter   • Rad Onc Aria Session Summary

## 2020-07-09 ENCOUNTER — APPOINTMENT (OUTPATIENT)
Dept: RADIATION ONCOLOGY | Facility: MEDICAL CENTER | Age: 75
End: 2020-07-09
Payer: COMMERCIAL

## 2020-07-09 LAB
CHEMOTHERAPY INFUSION START DATE: NORMAL
CHEMOTHERAPY INFUSION START DATE: NORMAL
CHEMOTHERAPY RECORDS: 1.5
CHEMOTHERAPY RECORDS: 1.5
CHEMOTHERAPY RECORDS: 4500
CHEMOTHERAPY RECORDS: 4500
CHEMOTHERAPY RECORDS: NORMAL
CHEMOTHERAPY RX CANCER: NORMAL
CHEMOTHERAPY RX CANCER: NORMAL
DATE 1ST CHEMO CANCER: NORMAL
DATE 1ST CHEMO CANCER: NORMAL
RAD ONC ARIA COURSE LAST TREATMENT DATE: NORMAL
RAD ONC ARIA COURSE LAST TREATMENT DATE: NORMAL
RAD ONC ARIA COURSE TREATMENT ELAPSED DAYS: NORMAL
RAD ONC ARIA COURSE TREATMENT ELAPSED DAYS: NORMAL
RAD ONC ARIA REFERENCE POINT DOSAGE GIVEN TO DATE: 10.5
RAD ONC ARIA REFERENCE POINT DOSAGE GIVEN TO DATE: 10.82
RAD ONC ARIA REFERENCE POINT DOSAGE GIVEN TO DATE: 12
RAD ONC ARIA REFERENCE POINT DOSAGE GIVEN TO DATE: 12.36
RAD ONC ARIA REFERENCE POINT ID: NORMAL
RAD ONC ARIA REFERENCE POINT SESSION DOSAGE GIVEN: 1.5
RAD ONC ARIA REFERENCE POINT SESSION DOSAGE GIVEN: 1.5
RAD ONC ARIA REFERENCE POINT SESSION DOSAGE GIVEN: 1.55
RAD ONC ARIA REFERENCE POINT SESSION DOSAGE GIVEN: 1.55

## 2020-07-09 PROCEDURE — 77386 HCHG IMRT DELIVERY COMPLEX: CPT | Mod: XE | Performed by: RADIOLOGY

## 2020-07-09 PROCEDURE — 77014 PR CT GUIDANCE PLACEMENT RAD THERAPY FIELDS: CPT | Mod: 26,XE | Performed by: RADIOLOGY

## 2020-07-10 ENCOUNTER — APPOINTMENT (OUTPATIENT)
Dept: RADIATION ONCOLOGY | Facility: MEDICAL CENTER | Age: 75
End: 2020-07-10
Payer: COMMERCIAL

## 2020-07-10 VITALS
WEIGHT: 161.27 LBS | BODY MASS INDEX: 28.57 KG/M2 | SYSTOLIC BLOOD PRESSURE: 157 MMHG | OXYGEN SATURATION: 97 % | TEMPERATURE: 97.8 F | DIASTOLIC BLOOD PRESSURE: 89 MMHG | HEART RATE: 80 BPM

## 2020-07-10 LAB
CHEMOTHERAPY INFUSION START DATE: NORMAL
CHEMOTHERAPY INFUSION START DATE: NORMAL
CHEMOTHERAPY RECORDS: 1.5
CHEMOTHERAPY RECORDS: 1.5
CHEMOTHERAPY RECORDS: 4500
CHEMOTHERAPY RECORDS: 4500
CHEMOTHERAPY RECORDS: NORMAL
CHEMOTHERAPY RX CANCER: NORMAL
CHEMOTHERAPY RX CANCER: NORMAL
DATE 1ST CHEMO CANCER: NORMAL
DATE 1ST CHEMO CANCER: NORMAL
RAD ONC ARIA COURSE LAST TREATMENT DATE: NORMAL
RAD ONC ARIA COURSE LAST TREATMENT DATE: NORMAL
RAD ONC ARIA COURSE TREATMENT ELAPSED DAYS: NORMAL
RAD ONC ARIA COURSE TREATMENT ELAPSED DAYS: NORMAL
RAD ONC ARIA REFERENCE POINT DOSAGE GIVEN TO DATE: 13.5
RAD ONC ARIA REFERENCE POINT DOSAGE GIVEN TO DATE: 13.91
RAD ONC ARIA REFERENCE POINT DOSAGE GIVEN TO DATE: 15
RAD ONC ARIA REFERENCE POINT DOSAGE GIVEN TO DATE: 15.45
RAD ONC ARIA REFERENCE POINT ID: NORMAL
RAD ONC ARIA REFERENCE POINT SESSION DOSAGE GIVEN: 1.5
RAD ONC ARIA REFERENCE POINT SESSION DOSAGE GIVEN: 1.5
RAD ONC ARIA REFERENCE POINT SESSION DOSAGE GIVEN: 1.55
RAD ONC ARIA REFERENCE POINT SESSION DOSAGE GIVEN: 1.55

## 2020-07-10 PROCEDURE — 77014 PR CT GUIDANCE PLACEMENT RAD THERAPY FIELDS: CPT | Mod: 26,XE | Performed by: RADIOLOGY

## 2020-07-10 PROCEDURE — 77427 RADIATION TX MANAGEMENT X5: CPT | Performed by: RADIOLOGY

## 2020-07-10 PROCEDURE — 77336 RADIATION PHYSICS CONSULT: CPT | Performed by: RADIOLOGY

## 2020-07-10 PROCEDURE — 77386 HCHG IMRT DELIVERY COMPLEX: CPT | Mod: XE | Performed by: RADIOLOGY

## 2020-07-10 ASSESSMENT — PAIN SCALES - GENERAL: PAINLEVEL: NO PAIN

## 2020-07-10 NOTE — ON TREATMENT VISIT
ON TREATMENT  NOTE  RADIATION ONCOLOGY DEPARTMENT    Patient name:  Emily Barnes    Primary Physician:  Osei CABRERA M.D. MRN: [id number]  [facility]: [id number]   Referring physician:  Peter Foster M.D. : 1945, 75 y.o.     ENCOUNTER DATE:  07/10/20    DIAGNOSIS:  Small cell lung cancer (HCC)  Staging form: Lung, AJCC 8th Edition  - Clinical stage from 2020: Stage IIB (cT1c, cN1, cM0) - Signed by William Cruz M.D. on 2020  Type of lung cancer: Small cell lung cancer      TREATMENT SUMMARY:  Aria Treatment Information        Some values may be hidden. Unless noted otherwise, only the newest values recorded on each date are displayed.         Aria Treatment Summary 7/10/20   Course First Treatment Date 2020   Course Last Treatment Date 07/10/2020   R_Lung_Nodes Plan from Course C2_SCLC   Fraction 9 of 30   Elapsed Course Days 4 @ [id number]   Prescribed Fraction Dose 150 cGy   Prescribed Total Dose 4,500 cGy   R_Lung_Nodes Reference Point from Course C2_SCLC   Elapsed Course Days 4 @ [id number]   Session Dose 150 cGy   Total Dose 1,350 cGy   R_Lung_Nodes CP Reference Point from Course C2_SCLC   Elapsed Course Days 4 @ [id number]   Session Dose 155 cGy   Total Dose 1,391 cGy             SUBJECTIVE:  General malaise, no fevers, mild nausea using zofran 1x per day      VITAL SIGNS:  KPS: 100, Fully active, able to carry on all pre-disease performed without restriction (ECOG equivalent 0)  Encounter Vitals  Temperature: 36.6 °C (97.8 °F)  Blood Pressure : 157/89  Pulse: 80  Pulse Oximetry: 97 %  Weight: 73.1 kg (161 lb 4.3 oz)  Pain Score: No pain  Pain Assessment 7/10/2020 2020 2020 2020 2020   Pain Assessment - Denies Pain - Denies Pain -   Pain Score 0 0 0 0 0   Some recent data might be hidden          PHYSICAL EXAM:    Physical Exam  Constitutional:       General: She is not in acute distress.  Pulmonary:      Breath sounds: Normal air entry.   Skin:      Findings: No erythema.   Neurological:      Mental Status: She is alert.          Toxicity Assessment 7/10/2020 7/8/2020   Toxicity Assessment Chest Chest   Fatigue (lethargy, malaise, asthenia) Moderate (e.g., decrease in performance status by 1 ECOG level or 20% Karnofsky) or causing difficulty performing some activities Increased fatigue over baseline, but not altering normal activities   Radiation Dermatitis None None   Anorexia Oral intake significantly decreased Oral intake significantly decreased   Dyspepsia/heartburn Mild None   Dysphagia, Esophagitis, odynophagoa (painful swallowing) Mild dysphagia, but can eat regular diet None   RT Dysphagia-Esophageal Mild dysphagia, but can eat regular diet None   Cough Absent Absent   Dyspnea Normal Normal       CURRENT MEDICATIONS:    Current Outpatient Medications:   •  lidocaine-prilocaine (EMLA) 2.5-2.5 % Cream, APPLY A SMALL AMOUNT OF CREAM TO PORT SITE AN HOUR PRIOR TO TREATMENT, Disp: , Rfl:   •  nicotine (NICODERM) 21 MG/24HR PATCH 24 HR, APPLY ONE PATCH TOPICALLY TO CLEAN DRY SKIN EVERY 24 HOURS, Disp: , Rfl:   •  ondansetron (ZOFRAN) 8 MG Tab, TAKE 1 TABLET BY MOUTH TWICE DAILY AS NEEDED FOR NAUSEA, Disp: , Rfl:   •  prochlorperazine (COMPAZINE) 10 MG Tab, TAKE 1 TABLET BY MOUTH EVERY 8 HOURS AS NEEDED FOR NAUSEA, Disp: , Rfl:   •  Cholecalciferol (VITAMIN D) 50 MCG (2000 UT) Cap, Take 4,000 Units by mouth every day., Disp: , Rfl:   •  Pseudoeph-Doxylamine-DM-APAP (NYQUIL PO), Take  by mouth as needed. Takes prn for sleep approx twice a month, Disp: , Rfl:   •  atorvastatin (LIPITOR) 10 MG Tab, Take 10 mg by mouth every day. Takes in the afternoon, Disp: , Rfl:   •  denosumab (PROLIA) 60 MG/ML Solution Prefilled Syringe, Inject 60 mg as instructed Once. Twice a year, Disp: , Rfl:   •  anastrozole (ARIMIDEX) 1 MG TABS, Take 1 mg by mouth every day. Takes in the afternoon, Disp: , Rfl:     LABORATORY DATA:   Lab Results   Component Value Date/Time    SODIUM 138  05/16/2020 08:57 AM    POTASSIUM 3.8 05/16/2020 08:57 AM    CHLORIDE 102 05/16/2020 08:57 AM    CO2 20 05/16/2020 08:57 AM    GLUCOSE 93 05/16/2020 08:57 AM    BUN 13 05/16/2020 08:57 AM    CREATININE 0.75 05/16/2020 08:57 AM         Lab Results   Component Value Date/Time    WBC 7.2 07/27/2017 12:35 PM    HEMOGLOBIN 15.7 07/27/2017 12:35 PM    HEMATOCRIT 47.0 07/27/2017 12:35 PM    MCV 95.1 07/27/2017 12:35 PM    PLATELETCT 199 07/27/2017 12:35 PM          IMPRESSION:  Small cell lung cancer (HCC)  Staging form: Lung, AJCC 8th Edition  - Clinical stage from 5/18/2020: Stage IIB (cT1c, cN1, cM0) - Signed by William Cruz M.D. on 5/18/2020  Type of lung cancer: Small cell lung cancer      PLAN:  No change in treatment plan    Disposition:  Treatment plan reviewed. Questions answered. Continue therapy outlined.   -Rec zofran TID and compazine TID alternating for nausea    William Cruz M.D.    No orders of the defined types were placed in this encounter.

## 2020-07-13 ENCOUNTER — APPOINTMENT (OUTPATIENT)
Dept: RADIATION ONCOLOGY | Facility: MEDICAL CENTER | Age: 75
End: 2020-07-13
Payer: COMMERCIAL

## 2020-07-13 LAB
CHEMOTHERAPY INFUSION START DATE: NORMAL
CHEMOTHERAPY RECORDS: 1.5
CHEMOTHERAPY RECORDS: 4500
CHEMOTHERAPY RECORDS: NORMAL
CHEMOTHERAPY RX CANCER: NORMAL
DATE 1ST CHEMO CANCER: NORMAL
RAD ONC ARIA COURSE LAST TREATMENT DATE: NORMAL
RAD ONC ARIA COURSE TREATMENT ELAPSED DAYS: NORMAL
RAD ONC ARIA REFERENCE POINT DOSAGE GIVEN TO DATE: 16.5
RAD ONC ARIA REFERENCE POINT DOSAGE GIVEN TO DATE: 17
RAD ONC ARIA REFERENCE POINT ID: NORMAL
RAD ONC ARIA REFERENCE POINT ID: NORMAL
RAD ONC ARIA REFERENCE POINT SESSION DOSAGE GIVEN: 1.5
RAD ONC ARIA REFERENCE POINT SESSION DOSAGE GIVEN: 1.55

## 2020-07-13 PROCEDURE — 77386 HCHG IMRT DELIVERY COMPLEX: CPT | Mod: XE | Performed by: RADIOLOGY

## 2020-07-13 PROCEDURE — 77014 PR CT GUIDANCE PLACEMENT RAD THERAPY FIELDS: CPT | Mod: 26,XE | Performed by: RADIOLOGY

## 2020-07-14 ENCOUNTER — APPOINTMENT (OUTPATIENT)
Dept: RADIATION ONCOLOGY | Facility: MEDICAL CENTER | Age: 75
End: 2020-07-14
Payer: COMMERCIAL

## 2020-07-14 LAB
CHEMOTHERAPY INFUSION START DATE: NORMAL
CHEMOTHERAPY INFUSION START DATE: NORMAL
CHEMOTHERAPY RECORDS: 1.5
CHEMOTHERAPY RECORDS: 1.5
CHEMOTHERAPY RECORDS: 4500
CHEMOTHERAPY RECORDS: 4500
CHEMOTHERAPY RECORDS: NORMAL
CHEMOTHERAPY RX CANCER: NORMAL
CHEMOTHERAPY RX CANCER: NORMAL
DATE 1ST CHEMO CANCER: NORMAL
DATE 1ST CHEMO CANCER: NORMAL
RAD ONC ARIA COURSE LAST TREATMENT DATE: NORMAL
RAD ONC ARIA COURSE LAST TREATMENT DATE: NORMAL
RAD ONC ARIA COURSE TREATMENT ELAPSED DAYS: NORMAL
RAD ONC ARIA COURSE TREATMENT ELAPSED DAYS: NORMAL
RAD ONC ARIA REFERENCE POINT DOSAGE GIVEN TO DATE: 19.5
RAD ONC ARIA REFERENCE POINT DOSAGE GIVEN TO DATE: 20.09
RAD ONC ARIA REFERENCE POINT DOSAGE GIVEN TO DATE: 21
RAD ONC ARIA REFERENCE POINT DOSAGE GIVEN TO DATE: 21.64
RAD ONC ARIA REFERENCE POINT ID: NORMAL
RAD ONC ARIA REFERENCE POINT SESSION DOSAGE GIVEN: 1.5
RAD ONC ARIA REFERENCE POINT SESSION DOSAGE GIVEN: 1.5
RAD ONC ARIA REFERENCE POINT SESSION DOSAGE GIVEN: 1.55
RAD ONC ARIA REFERENCE POINT SESSION DOSAGE GIVEN: 1.55

## 2020-07-14 PROCEDURE — 77014 PR CT GUIDANCE PLACEMENT RAD THERAPY FIELDS: CPT | Mod: 26 | Performed by: RADIOLOGY

## 2020-07-14 PROCEDURE — 77014 PR CT GUIDANCE PLACEMENT RAD THERAPY FIELDS: CPT | Mod: 26,XE | Performed by: RADIOLOGY

## 2020-07-14 PROCEDURE — 77386 HCHG IMRT DELIVERY COMPLEX: CPT | Performed by: RADIOLOGY

## 2020-07-15 ENCOUNTER — APPOINTMENT (OUTPATIENT)
Dept: RADIATION ONCOLOGY | Facility: MEDICAL CENTER | Age: 75
End: 2020-07-15
Payer: COMMERCIAL

## 2020-07-15 VITALS
TEMPERATURE: 97.6 F | OXYGEN SATURATION: 97 % | WEIGHT: 158.18 LBS | HEART RATE: 84 BPM | SYSTOLIC BLOOD PRESSURE: 142 MMHG | BODY MASS INDEX: 28.02 KG/M2 | DIASTOLIC BLOOD PRESSURE: 79 MMHG

## 2020-07-15 LAB
CHEMOTHERAPY INFUSION START DATE: NORMAL
CHEMOTHERAPY INFUSION START DATE: NORMAL
CHEMOTHERAPY RECORDS: 1.5
CHEMOTHERAPY RECORDS: 1.5
CHEMOTHERAPY RECORDS: 4500
CHEMOTHERAPY RECORDS: 4500
CHEMOTHERAPY RECORDS: NORMAL
CHEMOTHERAPY RX CANCER: NORMAL
CHEMOTHERAPY RX CANCER: NORMAL
DATE 1ST CHEMO CANCER: NORMAL
DATE 1ST CHEMO CANCER: NORMAL
RAD ONC ARIA COURSE LAST TREATMENT DATE: NORMAL
RAD ONC ARIA COURSE LAST TREATMENT DATE: NORMAL
RAD ONC ARIA COURSE TREATMENT ELAPSED DAYS: NORMAL
RAD ONC ARIA COURSE TREATMENT ELAPSED DAYS: NORMAL
RAD ONC ARIA REFERENCE POINT DOSAGE GIVEN TO DATE: 22.5
RAD ONC ARIA REFERENCE POINT DOSAGE GIVEN TO DATE: 23.18
RAD ONC ARIA REFERENCE POINT DOSAGE GIVEN TO DATE: 24
RAD ONC ARIA REFERENCE POINT DOSAGE GIVEN TO DATE: 24.73
RAD ONC ARIA REFERENCE POINT ID: NORMAL
RAD ONC ARIA REFERENCE POINT SESSION DOSAGE GIVEN: 1.5
RAD ONC ARIA REFERENCE POINT SESSION DOSAGE GIVEN: 1.5
RAD ONC ARIA REFERENCE POINT SESSION DOSAGE GIVEN: 1.55
RAD ONC ARIA REFERENCE POINT SESSION DOSAGE GIVEN: 1.55

## 2020-07-15 PROCEDURE — 77014 PR CT GUIDANCE PLACEMENT RAD THERAPY FIELDS: CPT | Mod: 26,XE | Performed by: RADIOLOGY

## 2020-07-15 PROCEDURE — 77427 RADIATION TX MANAGEMENT X5: CPT | Performed by: RADIOLOGY

## 2020-07-15 PROCEDURE — 77336 RADIATION PHYSICS CONSULT: CPT | Performed by: RADIOLOGY

## 2020-07-15 PROCEDURE — 77386 HCHG IMRT DELIVERY COMPLEX: CPT | Mod: XE | Performed by: RADIOLOGY

## 2020-07-15 ASSESSMENT — PAIN SCALES - GENERAL: PAINLEVEL: NO PAIN

## 2020-07-15 NOTE — ON TREATMENT VISIT
ON TREATMENT  NOTE  RADIATION ONCOLOGY DEPARTMENT    Patient name:  Emily Barnes    Primary Physician:  Osei CABRERA M.D. MRN: [id number]  [facility]: [id number]   Referring physician:  Peter Foster M.D. : 1945, 75 y.o.     ENCOUNTER DATE:  07/15/20    DIAGNOSIS:  Small cell lung cancer (HCC)  Staging form: Lung, AJCC 8th Edition  - Clinical stage from 2020: Stage IIB (cT1c, cN1, cM0) - Signed by William Cruz M.D. on 2020  Type of lung cancer: Small cell lung cancer      TREATMENT SUMMARY:  Aria Treatment Information        Some values may be hidden. Unless noted otherwise, only the newest values recorded on each date are displayed.         Aria Treatment Summary 7/15/20   Course First Treatment Date 2020   Course Last Treatment Date 07/15/2020   R_Lung_Nodes Plan from Course C2_SCLC   Fraction 15 of 30   Elapsed Course Days 9 @ [id number]   Prescribed Fraction Dose 150 cGy   Prescribed Total Dose 4,500 cGy   R_Lung_Nodes Reference Point from Course C2_SCLC   Elapsed Course Days  @ [id number]   Session Dose 150 cGy   Total Dose 2,250 cGy   R_Lung_Nodes CP Reference Point from Course C2_SCLC   Elapsed Course Days  @ [id number]   Session Dose 155 cGy   Total Dose 2,318 cGy             SUBJECTIVE:  Fatigued, no esophagitis      VITAL SIGNS:  KPS: 100, Fully active, able to carry on all pre-disease performed without restriction (ECOG equivalent 0)  Encounter Vitals  Temperature: 36.4 °C (97.6 °F)  Blood Pressure : 142/79  Pulse: 84  Pulse Oximetry: 97 %  Weight: 71.7 kg (158 lb 2.9 oz)  Pain Score: No pain  Pain Assessment 7/15/2020 7/10/2020 2020 2020   Pain Assessment Denies Pain - Denies Pain -   Pain Score 0 0 0 0   Some recent data might be hidden          PHYSICAL EXAM:    Physical Exam  Constitutional:       General: She is not in acute distress.  Pulmonary:      Breath sounds: Normal air entry.   Skin:     Findings: No erythema.   Neurological:      Mental  Status: She is alert.          Toxicity Assessment 7/15/2020 7/10/2020 7/8/2020   Toxicity Assessment Chest Chest Chest   Fatigue (lethargy, malaise, asthenia) Increased fatigue over baseline, but not altering normal activities Moderate (e.g., decrease in performance status by 1 ECOG level or 20% Karnofsky) or causing difficulty performing some activities Increased fatigue over baseline, but not altering normal activities   Radiation Dermatitis None None None   Anorexia Oral intake significantly decreased Oral intake significantly decreased Oral intake significantly decreased   Dyspepsia/heartburn None Mild None   Dysphagia, Esophagitis, odynophagoa (painful swallowing) None Mild dysphagia, but can eat regular diet None   RT Dysphagia-Esophageal None Mild dysphagia, but can eat regular diet None   Cough Absent Absent Absent   Dyspnea Normal Normal Normal       CURRENT MEDICATIONS:    Current Outpatient Medications:   •  lidocaine-prilocaine (EMLA) 2.5-2.5 % Cream, APPLY A SMALL AMOUNT OF CREAM TO PORT SITE AN HOUR PRIOR TO TREATMENT, Disp: , Rfl:   •  nicotine (NICODERM) 21 MG/24HR PATCH 24 HR, APPLY ONE PATCH TOPICALLY TO CLEAN DRY SKIN EVERY 24 HOURS, Disp: , Rfl:   •  ondansetron (ZOFRAN) 8 MG Tab, TAKE 1 TABLET BY MOUTH TWICE DAILY AS NEEDED FOR NAUSEA, Disp: , Rfl:   •  prochlorperazine (COMPAZINE) 10 MG Tab, TAKE 1 TABLET BY MOUTH EVERY 8 HOURS AS NEEDED FOR NAUSEA, Disp: , Rfl:   •  Cholecalciferol (VITAMIN D) 50 MCG (2000 UT) Cap, Take 4,000 Units by mouth every day., Disp: , Rfl:   •  Pseudoeph-Doxylamine-DM-APAP (NYQUIL PO), Take  by mouth as needed. Takes prn for sleep approx twice a month, Disp: , Rfl:   •  atorvastatin (LIPITOR) 10 MG Tab, Take 10 mg by mouth every day. Takes in the afternoon, Disp: , Rfl:   •  denosumab (PROLIA) 60 MG/ML Solution Prefilled Syringe, Inject 60 mg as instructed Once. Twice a year, Disp: , Rfl:   •  anastrozole (ARIMIDEX) 1 MG TABS, Take 1 mg by mouth every day. Takes in  the afternoon, Disp: , Rfl:     LABORATORY DATA:   Lab Results   Component Value Date/Time    SODIUM 138 05/16/2020 08:57 AM    POTASSIUM 3.8 05/16/2020 08:57 AM    CHLORIDE 102 05/16/2020 08:57 AM    CO2 20 05/16/2020 08:57 AM    GLUCOSE 93 05/16/2020 08:57 AM    BUN 13 05/16/2020 08:57 AM    CREATININE 0.75 05/16/2020 08:57 AM         Lab Results   Component Value Date/Time    WBC 7.2 07/27/2017 12:35 PM    HEMOGLOBIN 15.7 07/27/2017 12:35 PM    HEMATOCRIT 47.0 07/27/2017 12:35 PM    MCV 95.1 07/27/2017 12:35 PM    PLATELETCT 199 07/27/2017 12:35 PM        IMPRESSION:  Small cell lung cancer (HCC)  Staging form: Lung, AJCC 8th Edition  - Clinical stage from 5/18/2020: Stage IIB (cT1c, cN1, cM0) - Signed by William Cruz M.D. on 5/18/2020  Type of lung cancer: Small cell lung cancer      PLAN:  No change in treatment plan    Disposition:  Treatment plan reviewed. Questions answered. Continue therapy outlined.     William Cruz M.D.    Orders Placed This Encounter   • Rad Onc Aria Session Summary

## 2020-07-16 ENCOUNTER — APPOINTMENT (OUTPATIENT)
Dept: RADIATION ONCOLOGY | Facility: MEDICAL CENTER | Age: 75
End: 2020-07-16
Payer: COMMERCIAL

## 2020-07-16 VITALS — WEIGHT: 156.2 LBS | BODY MASS INDEX: 27.67 KG/M2

## 2020-07-16 LAB
CHEMOTHERAPY INFUSION START DATE: NORMAL
CHEMOTHERAPY INFUSION START DATE: NORMAL
CHEMOTHERAPY RECORDS: 1.5
CHEMOTHERAPY RECORDS: 1.5
CHEMOTHERAPY RECORDS: 4500
CHEMOTHERAPY RECORDS: 4500
CHEMOTHERAPY RECORDS: NORMAL
CHEMOTHERAPY RX CANCER: NORMAL
CHEMOTHERAPY RX CANCER: NORMAL
DATE 1ST CHEMO CANCER: NORMAL
DATE 1ST CHEMO CANCER: NORMAL
RAD ONC ARIA COURSE LAST TREATMENT DATE: NORMAL
RAD ONC ARIA COURSE LAST TREATMENT DATE: NORMAL
RAD ONC ARIA COURSE TREATMENT ELAPSED DAYS: NORMAL
RAD ONC ARIA COURSE TREATMENT ELAPSED DAYS: NORMAL
RAD ONC ARIA REFERENCE POINT DOSAGE GIVEN TO DATE: 25.5
RAD ONC ARIA REFERENCE POINT DOSAGE GIVEN TO DATE: 26.27
RAD ONC ARIA REFERENCE POINT DOSAGE GIVEN TO DATE: 27
RAD ONC ARIA REFERENCE POINT DOSAGE GIVEN TO DATE: 27.82
RAD ONC ARIA REFERENCE POINT ID: NORMAL
RAD ONC ARIA REFERENCE POINT SESSION DOSAGE GIVEN: 1.5
RAD ONC ARIA REFERENCE POINT SESSION DOSAGE GIVEN: 1.5
RAD ONC ARIA REFERENCE POINT SESSION DOSAGE GIVEN: 1.55
RAD ONC ARIA REFERENCE POINT SESSION DOSAGE GIVEN: 1.55

## 2020-07-16 PROCEDURE — 77014 PR CT GUIDANCE PLACEMENT RAD THERAPY FIELDS: CPT | Mod: 26,XE | Performed by: RADIOLOGY

## 2020-07-16 PROCEDURE — 77386 HCHG IMRT DELIVERY COMPLEX: CPT | Mod: XE | Performed by: RADIOLOGY

## 2020-07-16 ASSESSMENT — PAIN SCALES - GENERAL: PAINLEVEL: NO PAIN

## 2020-07-16 NOTE — ON TREATMENT VISIT
ON TREATMENT  NOTE  RADIATION ONCOLOGY DEPARTMENT    Patient name:  Emily Barnes    Primary Physician:  Osei CABRERA M.D. MRN: [id number]  [facility]: [id number]   Referring physician:  Peter Foster M.D. : 1945, 75 y.o.     ENCOUNTER DATE:  20    DIAGNOSIS:  Small cell lung cancer (HCC)  Staging form: Lung, AJCC 8th Edition  - Clinical stage from 2020: Stage IIB (cT1c, cN1, cM0) - Signed by William Cruz M.D. on 2020  Type of lung cancer: Small cell lung cancer      TREATMENT SUMMARY:  Aria Treatment Information        Some values may be hidden. Unless noted otherwise, only the newest values recorded on each date are displayed.         Aria Treatment Summary 20   Course First Treatment Date 2020    Course Last Treatment Date 2020    R_Lung_Nodes Plan from Course C2_SCLC   Fraction 18 of 30    Elapsed Course Days 10 @ [id number]    Prescribed Fraction Dose 150 cGy    Prescribed Total Dose 4,500 cGy    R_Lung_Nodes Reference Point from Course C2_SCLC   Elapsed Course Days 10 @ [id number]    Session Dose 150 cGy    Total Dose 2,700 cGy    R_Lung_Nodes CP Reference Point from Course C2_SCLC   Elapsed Course Days 10 @ [id number]    Session Dose 155 cGy    Total Dose 2,782 cGy     Some values recorded on this date have been omitted.              SUBJECTIVE:  No esophagitis, mild fatigue      VITAL SIGNS:  KPS: 100, Fully active, able to carry on all pre-disease performed without restriction (ECOG equivalent 0)  Encounter Vitals  Weight: 70.8 kg (156 lb 3.1 oz)  Pain Score: No pain  Pain Assessment 2020 7/15/2020 7/10/2020 2020   Pain Assessment - Denies Pain - Denies Pain   Pain Score 0 0 0 0   Some recent data might be hidden          PHYSICAL EXAM:    Physical Exam  Constitutional:       General: She is not in acute distress.  Pulmonary:      Breath sounds: Normal air entry.   Skin:     Findings: No erythema.   Neurological:      Mental Status: She is  alert.          Toxicity Assessment 7/16/2020 7/15/2020 7/10/2020 7/8/2020   Toxicity Assessment Chest Chest Chest Chest   Fatigue (lethargy, malaise, asthenia) Increased fatigue over baseline, but not altering normal activities Increased fatigue over baseline, but not altering normal activities Moderate (e.g., decrease in performance status by 1 ECOG level or 20% Karnofsky) or causing difficulty performing some activities Increased fatigue over baseline, but not altering normal activities   Radiation Dermatitis None None None None   Anorexia Oral intake significantly decreased Oral intake significantly decreased Oral intake significantly decreased Oral intake significantly decreased   Dyspepsia/heartburn None None Mild None   Dysphagia, Esophagitis, odynophagoa (painful swallowing) None None Mild dysphagia, but can eat regular diet None   RT Dysphagia-Esophageal None None Mild dysphagia, but can eat regular diet None   Cough Absent Absent Absent Absent   Dyspnea Normal Normal Normal Normal       CURRENT MEDICATIONS:    Current Outpatient Medications:   •  lidocaine-prilocaine (EMLA) 2.5-2.5 % Cream, APPLY A SMALL AMOUNT OF CREAM TO PORT SITE AN HOUR PRIOR TO TREATMENT, Disp: , Rfl:   •  nicotine (NICODERM) 21 MG/24HR PATCH 24 HR, APPLY ONE PATCH TOPICALLY TO CLEAN DRY SKIN EVERY 24 HOURS, Disp: , Rfl:   •  ondansetron (ZOFRAN) 8 MG Tab, TAKE 1 TABLET BY MOUTH TWICE DAILY AS NEEDED FOR NAUSEA, Disp: , Rfl:   •  prochlorperazine (COMPAZINE) 10 MG Tab, TAKE 1 TABLET BY MOUTH EVERY 8 HOURS AS NEEDED FOR NAUSEA, Disp: , Rfl:   •  Cholecalciferol (VITAMIN D) 50 MCG (2000 UT) Cap, Take 4,000 Units by mouth every day., Disp: , Rfl:   •  Pseudoeph-Doxylamine-DM-APAP (NYQUIL PO), Take  by mouth as needed. Takes prn for sleep approx twice a month, Disp: , Rfl:   •  atorvastatin (LIPITOR) 10 MG Tab, Take 10 mg by mouth every day. Takes in the afternoon, Disp: , Rfl:   •  denosumab (PROLIA) 60 MG/ML Solution Prefilled Syringe,  Inject 60 mg as instructed Once. Twice a year, Disp: , Rfl:   •  anastrozole (ARIMIDEX) 1 MG TABS, Take 1 mg by mouth every day. Takes in the afternoon, Disp: , Rfl:     LABORATORY DATA:   Lab Results   Component Value Date/Time    SODIUM 138 05/16/2020 08:57 AM    POTASSIUM 3.8 05/16/2020 08:57 AM    CHLORIDE 102 05/16/2020 08:57 AM    CO2 20 05/16/2020 08:57 AM    GLUCOSE 93 05/16/2020 08:57 AM    BUN 13 05/16/2020 08:57 AM    CREATININE 0.75 05/16/2020 08:57 AM         Lab Results   Component Value Date/Time    WBC 7.2 07/27/2017 12:35 PM    HEMOGLOBIN 15.7 07/27/2017 12:35 PM    HEMATOCRIT 47.0 07/27/2017 12:35 PM    MCV 95.1 07/27/2017 12:35 PM    PLATELETCT 199 07/27/2017 12:35 PM        RADIOLOGY DATA:  No results found.    IMPRESSION:  Small cell lung cancer (HCC)  Staging form: Lung, AJCC 8th Edition  - Clinical stage from 5/18/2020: Stage IIB (cT1c, cN1, cM0) - Signed by William Cruz M.D. on 5/18/2020  Type of lung cancer: Small cell lung cancer      PLAN:  No change in treatment plan    Disposition:  Treatment plan reviewed. Questions answered. Continue therapy outlined.     William Cruz M.D.    No orders of the defined types were placed in this encounter.

## 2020-07-17 ENCOUNTER — APPOINTMENT (OUTPATIENT)
Dept: RADIATION ONCOLOGY | Facility: MEDICAL CENTER | Age: 75
End: 2020-07-17
Payer: COMMERCIAL

## 2020-07-17 LAB
CHEMOTHERAPY INFUSION START DATE: NORMAL
CHEMOTHERAPY INFUSION START DATE: NORMAL
CHEMOTHERAPY RECORDS: 1.5
CHEMOTHERAPY RECORDS: 1.5
CHEMOTHERAPY RECORDS: 4500
CHEMOTHERAPY RECORDS: 4500
CHEMOTHERAPY RECORDS: NORMAL
CHEMOTHERAPY RX CANCER: NORMAL
CHEMOTHERAPY RX CANCER: NORMAL
DATE 1ST CHEMO CANCER: NORMAL
DATE 1ST CHEMO CANCER: NORMAL
RAD ONC ARIA COURSE LAST TREATMENT DATE: NORMAL
RAD ONC ARIA COURSE LAST TREATMENT DATE: NORMAL
RAD ONC ARIA COURSE TREATMENT ELAPSED DAYS: NORMAL
RAD ONC ARIA COURSE TREATMENT ELAPSED DAYS: NORMAL
RAD ONC ARIA REFERENCE POINT DOSAGE GIVEN TO DATE: 28.5
RAD ONC ARIA REFERENCE POINT DOSAGE GIVEN TO DATE: 29.36
RAD ONC ARIA REFERENCE POINT DOSAGE GIVEN TO DATE: 30
RAD ONC ARIA REFERENCE POINT DOSAGE GIVEN TO DATE: 30.91
RAD ONC ARIA REFERENCE POINT ID: NORMAL
RAD ONC ARIA REFERENCE POINT SESSION DOSAGE GIVEN: 1.5
RAD ONC ARIA REFERENCE POINT SESSION DOSAGE GIVEN: 1.5
RAD ONC ARIA REFERENCE POINT SESSION DOSAGE GIVEN: 1.55
RAD ONC ARIA REFERENCE POINT SESSION DOSAGE GIVEN: 1.55

## 2020-07-17 PROCEDURE — 77336 RADIATION PHYSICS CONSULT: CPT | Performed by: RADIOLOGY

## 2020-07-17 PROCEDURE — 77014 PR CT GUIDANCE PLACEMENT RAD THERAPY FIELDS: CPT | Mod: 26 | Performed by: RADIOLOGY

## 2020-07-17 PROCEDURE — 77386 HCHG IMRT DELIVERY COMPLEX: CPT | Performed by: RADIOLOGY

## 2020-07-17 PROCEDURE — 77427 RADIATION TX MANAGEMENT X5: CPT | Performed by: RADIOLOGY

## 2020-07-20 ENCOUNTER — APPOINTMENT (OUTPATIENT)
Dept: RADIATION ONCOLOGY | Facility: MEDICAL CENTER | Age: 75
End: 2020-07-20
Attending: RADIOLOGY
Payer: COMMERCIAL

## 2020-07-20 ENCOUNTER — APPOINTMENT (OUTPATIENT)
Dept: RADIATION ONCOLOGY | Facility: MEDICAL CENTER | Age: 75
End: 2020-07-20
Payer: COMMERCIAL

## 2020-07-20 LAB
CHEMOTHERAPY INFUSION START DATE: NORMAL
CHEMOTHERAPY INFUSION START DATE: NORMAL
CHEMOTHERAPY RECORDS: 1.5
CHEMOTHERAPY RECORDS: 1.5
CHEMOTHERAPY RECORDS: 4500
CHEMOTHERAPY RECORDS: 4500
CHEMOTHERAPY RECORDS: NORMAL
CHEMOTHERAPY RX CANCER: NORMAL
CHEMOTHERAPY RX CANCER: NORMAL
DATE 1ST CHEMO CANCER: NORMAL
DATE 1ST CHEMO CANCER: NORMAL
RAD ONC ARIA COURSE LAST TREATMENT DATE: NORMAL
RAD ONC ARIA COURSE LAST TREATMENT DATE: NORMAL
RAD ONC ARIA COURSE TREATMENT ELAPSED DAYS: NORMAL
RAD ONC ARIA COURSE TREATMENT ELAPSED DAYS: NORMAL
RAD ONC ARIA REFERENCE POINT DOSAGE GIVEN TO DATE: 31.5
RAD ONC ARIA REFERENCE POINT DOSAGE GIVEN TO DATE: 32.46
RAD ONC ARIA REFERENCE POINT DOSAGE GIVEN TO DATE: 33
RAD ONC ARIA REFERENCE POINT DOSAGE GIVEN TO DATE: 34
RAD ONC ARIA REFERENCE POINT ID: NORMAL
RAD ONC ARIA REFERENCE POINT SESSION DOSAGE GIVEN: 1.5
RAD ONC ARIA REFERENCE POINT SESSION DOSAGE GIVEN: 1.5
RAD ONC ARIA REFERENCE POINT SESSION DOSAGE GIVEN: 1.55
RAD ONC ARIA REFERENCE POINT SESSION DOSAGE GIVEN: 1.55

## 2020-07-20 PROCEDURE — 77014 PR CT GUIDANCE PLACEMENT RAD THERAPY FIELDS: CPT | Mod: 26,XE | Performed by: RADIOLOGY

## 2020-07-20 PROCEDURE — 77386 HCHG IMRT DELIVERY COMPLEX: CPT | Performed by: RADIOLOGY

## 2020-07-21 ENCOUNTER — APPOINTMENT (OUTPATIENT)
Dept: RADIATION ONCOLOGY | Facility: MEDICAL CENTER | Age: 75
End: 2020-07-21
Payer: COMMERCIAL

## 2020-07-21 LAB
CHEMOTHERAPY INFUSION START DATE: NORMAL
CHEMOTHERAPY INFUSION START DATE: NORMAL
CHEMOTHERAPY RECORDS: 1.5
CHEMOTHERAPY RECORDS: 1.5
CHEMOTHERAPY RECORDS: 4500
CHEMOTHERAPY RECORDS: 4500
CHEMOTHERAPY RECORDS: NORMAL
CHEMOTHERAPY RX CANCER: NORMAL
CHEMOTHERAPY RX CANCER: NORMAL
DATE 1ST CHEMO CANCER: NORMAL
DATE 1ST CHEMO CANCER: NORMAL
RAD ONC ARIA COURSE LAST TREATMENT DATE: NORMAL
RAD ONC ARIA COURSE LAST TREATMENT DATE: NORMAL
RAD ONC ARIA COURSE TREATMENT ELAPSED DAYS: NORMAL
RAD ONC ARIA COURSE TREATMENT ELAPSED DAYS: NORMAL
RAD ONC ARIA REFERENCE POINT DOSAGE GIVEN TO DATE: 34.5
RAD ONC ARIA REFERENCE POINT DOSAGE GIVEN TO DATE: 35.55
RAD ONC ARIA REFERENCE POINT DOSAGE GIVEN TO DATE: 36
RAD ONC ARIA REFERENCE POINT DOSAGE GIVEN TO DATE: 37.09
RAD ONC ARIA REFERENCE POINT ID: NORMAL
RAD ONC ARIA REFERENCE POINT SESSION DOSAGE GIVEN: 1.5
RAD ONC ARIA REFERENCE POINT SESSION DOSAGE GIVEN: 1.5
RAD ONC ARIA REFERENCE POINT SESSION DOSAGE GIVEN: 1.55
RAD ONC ARIA REFERENCE POINT SESSION DOSAGE GIVEN: 1.55

## 2020-07-21 PROCEDURE — 77014 PR CT GUIDANCE PLACEMENT RAD THERAPY FIELDS: CPT | Mod: 26,XE | Performed by: RADIOLOGY

## 2020-07-21 PROCEDURE — 77386 HCHG IMRT DELIVERY COMPLEX: CPT | Performed by: RADIOLOGY

## 2020-07-22 ENCOUNTER — APPOINTMENT (OUTPATIENT)
Dept: RADIATION ONCOLOGY | Facility: MEDICAL CENTER | Age: 75
End: 2020-07-22
Payer: COMMERCIAL

## 2020-07-22 VITALS
HEART RATE: 98 BPM | BODY MASS INDEX: 27.3 KG/M2 | OXYGEN SATURATION: 95 % | WEIGHT: 154.1 LBS | SYSTOLIC BLOOD PRESSURE: 135 MMHG | TEMPERATURE: 98.2 F | DIASTOLIC BLOOD PRESSURE: 83 MMHG

## 2020-07-22 DIAGNOSIS — C34.90 SMALL CELL LUNG CANCER (HCC): ICD-10-CM

## 2020-07-22 LAB
CHEMOTHERAPY INFUSION START DATE: NORMAL
CHEMOTHERAPY INFUSION START DATE: NORMAL
CHEMOTHERAPY RECORDS: 1.5
CHEMOTHERAPY RECORDS: 1.5
CHEMOTHERAPY RECORDS: 4500
CHEMOTHERAPY RECORDS: 4500
CHEMOTHERAPY RECORDS: NORMAL
CHEMOTHERAPY RX CANCER: NORMAL
CHEMOTHERAPY RX CANCER: NORMAL
DATE 1ST CHEMO CANCER: NORMAL
DATE 1ST CHEMO CANCER: NORMAL
RAD ONC ARIA COURSE LAST TREATMENT DATE: NORMAL
RAD ONC ARIA COURSE LAST TREATMENT DATE: NORMAL
RAD ONC ARIA COURSE TREATMENT ELAPSED DAYS: NORMAL
RAD ONC ARIA COURSE TREATMENT ELAPSED DAYS: NORMAL
RAD ONC ARIA REFERENCE POINT DOSAGE GIVEN TO DATE: 37.5
RAD ONC ARIA REFERENCE POINT DOSAGE GIVEN TO DATE: 38.64
RAD ONC ARIA REFERENCE POINT DOSAGE GIVEN TO DATE: 39
RAD ONC ARIA REFERENCE POINT DOSAGE GIVEN TO DATE: 40.18
RAD ONC ARIA REFERENCE POINT ID: NORMAL
RAD ONC ARIA REFERENCE POINT SESSION DOSAGE GIVEN: 1.5
RAD ONC ARIA REFERENCE POINT SESSION DOSAGE GIVEN: 1.5
RAD ONC ARIA REFERENCE POINT SESSION DOSAGE GIVEN: 1.55
RAD ONC ARIA REFERENCE POINT SESSION DOSAGE GIVEN: 1.55

## 2020-07-22 PROCEDURE — 77336 RADIATION PHYSICS CONSULT: CPT | Performed by: RADIOLOGY

## 2020-07-22 PROCEDURE — 77014 PR CT GUIDANCE PLACEMENT RAD THERAPY FIELDS: CPT | Mod: 26,XE | Performed by: RADIOLOGY

## 2020-07-22 PROCEDURE — 77427 RADIATION TX MANAGEMENT X5: CPT | Performed by: RADIOLOGY

## 2020-07-22 PROCEDURE — 77386 HCHG IMRT DELIVERY COMPLEX: CPT | Mod: XE | Performed by: RADIOLOGY

## 2020-07-22 RX ORDER — ZINC SULFATE 50(220)MG
220 CAPSULE ORAL DAILY
COMMUNITY
End: 2022-10-14

## 2020-07-22 ASSESSMENT — PAIN SCALES - GENERAL: PAINLEVEL: NO PAIN

## 2020-07-22 NOTE — ADDENDUM NOTE
Encounter addended by: William Cruz M.D. on: 7/22/2020 12:58 PM   Actions taken: Visit diagnoses modified, Order list changed, Diagnosis association updated

## 2020-07-23 ENCOUNTER — APPOINTMENT (OUTPATIENT)
Dept: RADIATION ONCOLOGY | Facility: MEDICAL CENTER | Age: 75
End: 2020-07-23
Payer: COMMERCIAL

## 2020-07-23 VITALS
SYSTOLIC BLOOD PRESSURE: 131 MMHG | BODY MASS INDEX: 27.14 KG/M2 | DIASTOLIC BLOOD PRESSURE: 87 MMHG | WEIGHT: 153.22 LBS | OXYGEN SATURATION: 97 % | HEART RATE: 101 BPM | TEMPERATURE: 97.9 F

## 2020-07-23 LAB
CHEMOTHERAPY INFUSION START DATE: NORMAL
CHEMOTHERAPY INFUSION START DATE: NORMAL
CHEMOTHERAPY RECORDS: 1.5
CHEMOTHERAPY RECORDS: 1.5
CHEMOTHERAPY RECORDS: 4500
CHEMOTHERAPY RECORDS: 4500
CHEMOTHERAPY RECORDS: NORMAL
CHEMOTHERAPY RX CANCER: NORMAL
CHEMOTHERAPY RX CANCER: NORMAL
DATE 1ST CHEMO CANCER: NORMAL
DATE 1ST CHEMO CANCER: NORMAL
RAD ONC ARIA COURSE LAST TREATMENT DATE: NORMAL
RAD ONC ARIA COURSE LAST TREATMENT DATE: NORMAL
RAD ONC ARIA COURSE TREATMENT ELAPSED DAYS: NORMAL
RAD ONC ARIA COURSE TREATMENT ELAPSED DAYS: NORMAL
RAD ONC ARIA REFERENCE POINT DOSAGE GIVEN TO DATE: 40.5
RAD ONC ARIA REFERENCE POINT DOSAGE GIVEN TO DATE: 41.73
RAD ONC ARIA REFERENCE POINT DOSAGE GIVEN TO DATE: 42
RAD ONC ARIA REFERENCE POINT DOSAGE GIVEN TO DATE: 43.27
RAD ONC ARIA REFERENCE POINT ID: NORMAL
RAD ONC ARIA REFERENCE POINT SESSION DOSAGE GIVEN: 1.5
RAD ONC ARIA REFERENCE POINT SESSION DOSAGE GIVEN: 1.5
RAD ONC ARIA REFERENCE POINT SESSION DOSAGE GIVEN: 1.55
RAD ONC ARIA REFERENCE POINT SESSION DOSAGE GIVEN: 1.55

## 2020-07-23 PROCEDURE — 77014 PR CT GUIDANCE PLACEMENT RAD THERAPY FIELDS: CPT | Mod: 26,XE | Performed by: RADIOLOGY

## 2020-07-23 PROCEDURE — 77386 HCHG IMRT DELIVERY COMPLEX: CPT | Performed by: RADIOLOGY

## 2020-07-23 RX ORDER — SODIUM CHLORIDE 9 MG/ML
INJECTION, SOLUTION INTRAVENOUS CONTINUOUS
Status: CANCELLED | OUTPATIENT
Start: 2020-08-13

## 2020-07-23 ASSESSMENT — PAIN SCALES - GENERAL: PAINLEVEL: NO PAIN

## 2020-07-23 NOTE — ON TREATMENT VISIT
ON TREATMENT  NOTE  RADIATION ONCOLOGY DEPARTMENT    Patient name:  Emily Barnes    Primary Physician:  Osei CBARERA M.D. MRN: [id number]  [facility]: [id number]   Referring physician:  Peter Foster M.D. : 1945, 75 y.o.     ENCOUNTER DATE:  20    DIAGNOSIS:  Small cell lung cancer (HCC)  Staging form: Lung, AJCC 8th Edition  - Clinical stage from 2020: Stage IIB (cT1c, cN1, cM0) - Signed by William Cruz M.D. on 2020  Type of lung cancer: Small cell lung cancer      TREATMENT SUMMARY:  [facility]ria Treatment Information        Some values may be hidden. Unless noted otherwise, only the newest values recorded on each date are displayed.         [facility]ria Treatment Summary 20   Course First Treatment Date 2020   Course Last Treatment Date 2020   R_Lung_Nodes Plan from Course C2_SCLC   Fraction 27 of 30   Elapsed Course Days    Prescribed Fraction Dose 150 cGy   Prescribed Total Dose 4,500 cGy   R_Lung_Nodes Reference Point from Course C2_SCLC   Elapsed Course Days 18   Session Dose 150 cGy   Total Dose 4,050 cGy   R_Lung_Nodes CP Reference Point from Course C2_SCLC   Elapsed Course Days    Session Dose 155 cGy   Total Dose 4,173 cGy             SUBJECTIVE:  Mild taste alteration and fatigue       VITAL SIGNS:  KPS: 90, Able to carry on normal activity; minor signs or symptoms of disease (ECOG equivalent 0)  Encounter Vitals  Temperature: 36.6 °C (97.9 °F)  Blood Pressure : 131/87  Pulse: (!) 101  Pulse Oximetry: 97 %  Weight: 69.5 kg (153 lb 3.5 oz)  Pain Score: No pain  Pain Assessment 2020 2020 2020 7/15/2020   Pain Assessment Denies Pain Denies Pain - Denies Pain   Pain Score 0 0 0 0   Some recent data might be hidden     Karnofsky Score: 70    PHYSICAL EXAM:    Physical Exam  Constitutional:       General: She is not in acute distress.  Pulmonary:      Breath sounds: Normal air entry.   Skin:     Findings: No  erythema.   Neurological:      Mental Status: She is alert.          Toxicity Assessment 7/23/2020 7/22/2020 7/16/2020 7/15/2020 7/10/2020 7/8/2020   Toxicity Assessment Chest Chest Chest Chest Chest Chest   Fatigue (lethargy, malaise, asthenia) Moderate (e.g., decrease in performance status by 1 ECOG level or 20% Karnofsky) or causing difficulty performing some activities Moderate (e.g., decrease in performance status by 1 ECOG level or 20% Karnofsky) or causing difficulty performing some activities Increased fatigue over baseline, but not altering normal activities Increased fatigue over baseline, but not altering normal activities Moderate (e.g., decrease in performance status by 1 ECOG level or 20% Karnofsky) or causing difficulty performing some activities Increased fatigue over baseline, but not altering normal activities   Radiation Dermatitis None None None None None None   Anorexia Oral intake significantly decreased Oral intake significantly decreased Oral intake significantly decreased Oral intake significantly decreased Oral intake significantly decreased Oral intake significantly decreased   Dyspepsia/heartburn None None None None Mild None   Dysphagia, Esophagitis, odynophagoa (painful swallowing) Mild dysphagia, but can eat regular diet None None None Mild dysphagia, but can eat regular diet None   RT Dysphagia-Esophageal Mild dysphagia, but can eat regular diet None None None Mild dysphagia, but can eat regular diet None   Cough Mild, relieved by non-prescription medication Absent Absent Absent Absent Absent   Dyspnea Normal Normal Normal Normal Normal Normal       CURRENT MEDICATIONS:    Current Outpatient Medications:   •  zinc sulfate (ZINCATE) 220 (50 Zn) MG Cap, Take 220 mg by mouth every day., Disp: , Rfl:   •  lidocaine-prilocaine (EMLA) 2.5-2.5 % Cream, APPLY A SMALL AMOUNT OF CREAM TO PORT SITE AN HOUR PRIOR TO TREATMENT, Disp: , Rfl:   •  nicotine (NICODERM) 21 MG/24HR PATCH 24 HR, APPLY  ONE PATCH TOPICALLY TO CLEAN DRY SKIN EVERY 24 HOURS, Disp: , Rfl:   •  ondansetron (ZOFRAN) 8 MG Tab, TAKE 1 TABLET BY MOUTH TWICE DAILY AS NEEDED FOR NAUSEA, Disp: , Rfl:   •  prochlorperazine (COMPAZINE) 10 MG Tab, TAKE 1 TABLET BY MOUTH EVERY 8 HOURS AS NEEDED FOR NAUSEA, Disp: , Rfl:   •  Cholecalciferol (VITAMIN D) 50 MCG (2000 UT) Cap, Take 4,000 Units by mouth every day., Disp: , Rfl:   •  Pseudoeph-Doxylamine-DM-APAP (NYQUIL PO), Take  by mouth as needed. Takes prn for sleep approx twice a month, Disp: , Rfl:   •  atorvastatin (LIPITOR) 10 MG Tab, Take 10 mg by mouth every day. Takes in the afternoon, Disp: , Rfl:   •  denosumab (PROLIA) 60 MG/ML Solution Prefilled Syringe, Inject 60 mg as instructed Once. Twice a year, Disp: , Rfl:   •  anastrozole (ARIMIDEX) 1 MG TABS, Take 1 mg by mouth every day. Takes in the afternoon, Disp: , Rfl:     LABORATORY DATA:   Lab Results   Component Value Date/Time    SODIUM 138 05/16/2020 08:57 AM    POTASSIUM 3.8 05/16/2020 08:57 AM    CHLORIDE 102 05/16/2020 08:57 AM    CO2 20 05/16/2020 08:57 AM    GLUCOSE 93 05/16/2020 08:57 AM    BUN 13 05/16/2020 08:57 AM    CREATININE 0.75 05/16/2020 08:57 AM         Lab Results   Component Value Date/Time    WBC 7.2 07/27/2017 12:35 PM    HEMOGLOBIN 15.7 07/27/2017 12:35 PM    HEMATOCRIT 47.0 07/27/2017 12:35 PM    MCV 95.1 07/27/2017 12:35 PM    PLATELETCT 199 07/27/2017 12:35 PM        RADIOLOGY DATA:  No results found.    IMPRESSION:  Small cell lung cancer (HCC)  Staging form: Lung, AJCC 8th Edition  - Clinical stage from 5/18/2020: Stage IIB (cT1c, cN1, cM0) - Signed by William Cruz M.D. on 5/18/2020  Type of lung cancer: Small cell lung cancer      PLAN:  No change in treatment plan    Disposition:  Treatment plan reviewed. Questions answered. Continue therapy outlined.     William Cruz M.D.    No orders of the defined types were placed in this encounter.

## 2020-07-24 ENCOUNTER — APPOINTMENT (OUTPATIENT)
Dept: RADIATION ONCOLOGY | Facility: MEDICAL CENTER | Age: 75
End: 2020-07-24
Payer: COMMERCIAL

## 2020-07-24 LAB
CHEMOTHERAPY INFUSION START DATE: NORMAL
CHEMOTHERAPY INFUSION START DATE: NORMAL
CHEMOTHERAPY RECORDS: 1.5
CHEMOTHERAPY RECORDS: 1.5
CHEMOTHERAPY RECORDS: 4500
CHEMOTHERAPY RECORDS: 4500
CHEMOTHERAPY RECORDS: NORMAL
CHEMOTHERAPY RX CANCER: NORMAL
CHEMOTHERAPY RX CANCER: NORMAL
DATE 1ST CHEMO CANCER: NORMAL
DATE 1ST CHEMO CANCER: NORMAL
RAD ONC ARIA COURSE LAST TREATMENT DATE: NORMAL
RAD ONC ARIA COURSE LAST TREATMENT DATE: NORMAL
RAD ONC ARIA COURSE TREATMENT ELAPSED DAYS: NORMAL
RAD ONC ARIA COURSE TREATMENT ELAPSED DAYS: NORMAL
RAD ONC ARIA REFERENCE POINT DOSAGE GIVEN TO DATE: 43.5
RAD ONC ARIA REFERENCE POINT DOSAGE GIVEN TO DATE: 44.82
RAD ONC ARIA REFERENCE POINT DOSAGE GIVEN TO DATE: 45
RAD ONC ARIA REFERENCE POINT DOSAGE GIVEN TO DATE: 46.36
RAD ONC ARIA REFERENCE POINT ID: NORMAL
RAD ONC ARIA REFERENCE POINT SESSION DOSAGE GIVEN: 1.5
RAD ONC ARIA REFERENCE POINT SESSION DOSAGE GIVEN: 1.5
RAD ONC ARIA REFERENCE POINT SESSION DOSAGE GIVEN: 1.55
RAD ONC ARIA REFERENCE POINT SESSION DOSAGE GIVEN: 1.55

## 2020-07-24 PROCEDURE — 77427 RADIATION TX MANAGEMENT X5: CPT | Performed by: RADIOLOGY

## 2020-07-24 PROCEDURE — 77336 RADIATION PHYSICS CONSULT: CPT | Performed by: RADIOLOGY

## 2020-07-24 PROCEDURE — 77386 HCHG IMRT DELIVERY COMPLEX: CPT | Mod: XE | Performed by: RADIOLOGY

## 2020-07-24 PROCEDURE — 77014 PR CT GUIDANCE PLACEMENT RAD THERAPY FIELDS: CPT | Mod: 26,XE | Performed by: RADIOLOGY

## 2020-07-27 LAB
CHEMOTHERAPY INFUSION START DATE: NORMAL
CHEMOTHERAPY INFUSION STOP DATE: NORMAL
CHEMOTHERAPY RECORDS: 1.5
CHEMOTHERAPY RECORDS: 4500
CHEMOTHERAPY RECORDS: NORMAL
CHEMOTHERAPY RX CANCER: NORMAL
DATE 1ST CHEMO CANCER: NORMAL
RAD ONC ARIA COURSE LAST TREATMENT DATE: NORMAL
RAD ONC ARIA COURSE TREATMENT ELAPSED DAYS: NORMAL
RAD ONC ARIA REFERENCE POINT DOSAGE GIVEN TO DATE: 45
RAD ONC ARIA REFERENCE POINT DOSAGE GIVEN TO DATE: 46.36
RAD ONC ARIA REFERENCE POINT ID: NORMAL
RAD ONC ARIA REFERENCE POINT ID: NORMAL

## 2020-08-06 SDOH — HEALTH STABILITY: MENTAL HEALTH: HOW OFTEN DO YOU HAVE A DRINK CONTAINING ALCOHOL?: 2-3 TIMES A WEEK

## 2020-08-13 ENCOUNTER — HOSPITAL ENCOUNTER (OUTPATIENT)
Facility: MEDICAL CENTER | Age: 75
End: 2020-08-13
Attending: INTERNAL MEDICINE | Admitting: INTERNAL MEDICINE
Payer: COMMERCIAL

## 2020-08-13 ENCOUNTER — APPOINTMENT (OUTPATIENT)
Dept: RADIOLOGY | Facility: MEDICAL CENTER | Age: 75
End: 2020-08-13
Attending: INTERNAL MEDICINE
Payer: COMMERCIAL

## 2020-08-13 VITALS
OXYGEN SATURATION: 94 % | HEIGHT: 63 IN | WEIGHT: 156 LBS | DIASTOLIC BLOOD PRESSURE: 77 MMHG | SYSTOLIC BLOOD PRESSURE: 129 MMHG | BODY MASS INDEX: 27.64 KG/M2 | HEART RATE: 95 BPM | RESPIRATION RATE: 18 BRPM

## 2020-08-13 DIAGNOSIS — C50.919 MALIGNANT NEOPLASM OF FEMALE BREAST, UNSPECIFIED ESTROGEN RECEPTOR STATUS, UNSPECIFIED LATERALITY, UNSPECIFIED SITE OF BREAST (HCC): ICD-10-CM

## 2020-08-13 PROCEDURE — 700101 HCHG RX REV CODE 250

## 2020-08-13 PROCEDURE — 700111 HCHG RX REV CODE 636 W/ 250 OVERRIDE (IP)

## 2020-08-13 PROCEDURE — 36590 REMOVAL TUNNELED CV CATH: CPT

## 2020-08-13 PROCEDURE — 700111 HCHG RX REV CODE 636 W/ 250 OVERRIDE (IP): Performed by: RADIOLOGY

## 2020-08-13 PROCEDURE — 99152 MOD SED SAME PHYS/QHP 5/>YRS: CPT

## 2020-08-13 RX ORDER — OXYCODONE HYDROCHLORIDE 5 MG/1
5 TABLET ORAL
Status: DISCONTINUED | OUTPATIENT
Start: 2020-08-13 | End: 2020-08-13 | Stop reason: HOSPADM

## 2020-08-13 RX ORDER — SODIUM CHLORIDE 9 MG/ML
INJECTION, SOLUTION INTRAVENOUS CONTINUOUS
Status: DISCONTINUED | OUTPATIENT
Start: 2020-08-13 | End: 2020-08-13 | Stop reason: HOSPADM

## 2020-08-13 RX ORDER — ONDANSETRON 2 MG/ML
4 INJECTION INTRAMUSCULAR; INTRAVENOUS PRN
Status: DISCONTINUED | OUTPATIENT
Start: 2020-08-13 | End: 2020-08-13 | Stop reason: HOSPADM

## 2020-08-13 RX ORDER — SODIUM CHLORIDE 9 MG/ML
500 INJECTION, SOLUTION INTRAVENOUS
Status: DISCONTINUED | OUTPATIENT
Start: 2020-08-13 | End: 2020-08-13 | Stop reason: HOSPADM

## 2020-08-13 RX ORDER — HYDROMORPHONE HYDROCHLORIDE 2 MG/ML
0.5 INJECTION, SOLUTION INTRAMUSCULAR; INTRAVENOUS; SUBCUTANEOUS
Status: DISCONTINUED | OUTPATIENT
Start: 2020-08-13 | End: 2020-08-13 | Stop reason: HOSPADM

## 2020-08-13 RX ORDER — FLUMAZENIL 0.1 MG/ML
INJECTION INTRAVENOUS
Status: COMPLETED
Start: 2020-08-13 | End: 2020-08-13

## 2020-08-13 RX ORDER — NALOXONE HYDROCHLORIDE 0.4 MG/ML
INJECTION, SOLUTION INTRAMUSCULAR; INTRAVENOUS; SUBCUTANEOUS
Status: COMPLETED
Start: 2020-08-13 | End: 2020-08-13

## 2020-08-13 RX ORDER — LIDOCAINE HYDROCHLORIDE AND EPINEPHRINE 10; 10 MG/ML; UG/ML
INJECTION, SOLUTION INFILTRATION; PERINEURAL
Status: COMPLETED
Start: 2020-08-13 | End: 2020-08-13

## 2020-08-13 RX ORDER — ONDANSETRON 2 MG/ML
4 INJECTION INTRAMUSCULAR; INTRAVENOUS EVERY 8 HOURS PRN
Status: DISCONTINUED | OUTPATIENT
Start: 2020-08-13 | End: 2020-08-13 | Stop reason: HOSPADM

## 2020-08-13 RX ORDER — MIDAZOLAM HYDROCHLORIDE 1 MG/ML
.5-2 INJECTION INTRAMUSCULAR; INTRAVENOUS PRN
Status: DISCONTINUED | OUTPATIENT
Start: 2020-08-13 | End: 2020-08-13 | Stop reason: HOSPADM

## 2020-08-13 RX ORDER — MIDAZOLAM HYDROCHLORIDE 1 MG/ML
INJECTION INTRAMUSCULAR; INTRAVENOUS
Status: COMPLETED
Start: 2020-08-13 | End: 2020-08-13

## 2020-08-13 RX ADMIN — MIDAZOLAM HYDROCHLORIDE 1 MG: 1 INJECTION, SOLUTION INTRAMUSCULAR; INTRAVENOUS at 10:30

## 2020-08-13 RX ADMIN — FENTANYL CITRATE 50 MCG: 50 INJECTION INTRAMUSCULAR; INTRAVENOUS at 10:30

## 2020-08-13 RX ADMIN — LIDOCAINE HYDROCHLORIDE,EPINEPHRINE BITARTRATE: 10; .01 INJECTION, SOLUTION INFILTRATION; PERINEURAL at 09:45

## 2020-08-13 RX ADMIN — FENTANYL CITRATE 25 MCG: 50 INJECTION INTRAMUSCULAR; INTRAVENOUS at 10:33

## 2020-08-13 RX ADMIN — MIDAZOLAM HYDROCHLORIDE 1 MG: 1 INJECTION, SOLUTION INTRAMUSCULAR; INTRAVENOUS at 10:32

## 2020-08-13 NOTE — OR SURGEON
Immediate Post- Operative Note        PostOp Diagnosis: completed chemotx      Procedure(s): port removal      Estimated Blood Loss: Less than 5 ml        Complications: None            8/13/2020     10:43 AM     Rashel Guzman M.D.

## 2020-08-13 NOTE — PROGRESS NOTES
CHELLY    Reviewed DC orders with pt and daughter, Estela. DC PIV. Pt tolerated procedure. Vitals stable. No questions from pt or daughter.  DC home.    Implanted Port Removal, Care After  This sheet gives you information about how to care for yourself after your procedure. Your health care provider may also give you more specific instructions. If you have problems or questions, contact your health care provider.  What can I expect after the procedure?  After the procedure, it is common to have:  · Soreness or pain near your incision.  · Some swelling or bruising near your incision.  Follow these instructions at home:  Medicines  · Take over-the-counter and prescription medicines only as told by your health care provider.  · If you were prescribed an antibiotic medicine, take it as told by your health care provider. Do not stop taking the antibiotic even if you start to feel better.  Bathing  · Do not take baths, swim, or use a hot tub until your health care provider approves. Ask your health care provider if you can take showers. You may only be allowed to take sponge baths.  Incision care    · Follow instructions from your health care provider about how to take care of your incision. Make sure you:  ? Wash your hands with soap and water before you change your bandage (dressing). If soap and water are not available, use hand .  ? Change your dressing as told by your health care provider.  ? Keep your dressing dry.  ? Leave stitches (sutures), skin glue, or adhesive strips in place. These skin closures may need to stay in place for 2 weeks or longer. If adhesive strip edges start to loosen and curl up, you may trim the loose edges. Do not remove adhesive strips completely unless your health care provider tells you to do that.  · Check your incision area every day for signs of infection. Check for:  ? More redness, swelling, or pain.  ? More fluid or blood.  ? Warmth.  ? Pus or a bad smell.  Driving    · Do  not drive for 24 hours if you were given a medicine to help you relax (sedative) during your procedure.  · If you did not receive a sedative, ask your health care provider when it is safe to drive.  Activity  · Return to your normal activities as told by your health care provider. Ask your health care provider what activities are safe for you.  · Do not lift anything that is heavier than 10 lb (4.5 kg), or the limit that you are told, until your health care provider says that it is safe.  · Do not do activities that involve lifting your arms over your head.  General instructions  · Do not use any products that contain nicotine or tobacco, such as cigarettes and e-cigarettes. These can delay healing. If you need help quitting, ask your health care provider.  · Keep all follow-up visits as told by your health care provider. This is important.  Contact a health care provider if:  · You have more redness, swelling, or pain around your incision.  · You have more fluid or blood coming from your incision.  · Your incision feels warm to the touch.  · You have pus or a bad smell coming from your incision.  · You have pain that is not relieved by your pain medicine.  Get help right away if you have:  · A fever or chills.  · Chest pain.  · Difficulty breathing.  Summary  · After the procedure, it is common to have pain, soreness, swelling, or bruising near your incision.  · If you were prescribed an antibiotic medicine, take it as told by your health care provider. Do not stop taking the antibiotic even if you start to feel better.  · Do not drive for 24 hours if you were given a sedative during your procedure.  · Return to your normal activities as told by your health care provider. Ask your health care provider what activities are safe for you.  This information is not intended to replace advice given to you by your health care provider. Make sure you discuss any questions you have with your health care provider.  Document  Released: 11/28/2016 Document Revised: 01/31/2019 Document Reviewed: 01/31/2019  [email or web address] Patient Education © 2020 [email or web address]        Moderate Conscious Sedation, Adult, Care After  These instructions provide you with information about caring for yourself after your procedure. Your health care provider may also give you more specific instructions. Your treatment has been planned according to current medical practices, but problems sometimes occur. Call your health care provider if you have any problems or questions after your procedure.  What can I expect after the procedure?  After your procedure, it is common:  · To feel sleepy for several hours.  · To feel clumsy and have poor balance for several hours.  · To have poor judgment for several hours.  · To vomit if you eat too soon.  Follow these instructions at home:  For at least 24 hours after the procedure:    · Do not:  ? Participate in activities where you could fall or become injured.  ? Drive.  ? Use heavy machinery.  ? Drink alcohol.  ? Take sleeping pills or medicines that cause drowsiness.  ? Make important decisions or sign legal documents.  ? Take care of children on your own.  · Rest.  Eating and drinking  · Follow the diet recommended by your health care provider.  · If you vomit:  ? Drink water, juice, or soup when you can drink without vomiting.  ? Make sure you have little or no nausea before eating solid foods.  General instructions  · Have a responsible adult stay with you until you are awake and alert.  · Take over-the-counter and prescription medicines only as told by your health care provider.  · If you smoke, do not smoke without supervision.  · Keep all follow-up visits as told by your health care provider. This is important.  Contact a health care provider if:  · You keep feeling nauseous or you keep vomiting.  · You feel light-headed.  · You develop a rash.  · You have a fever.  Get help right away if:  · You have trouble breathing.  This  information is not intended to replace advice given to you by your health care provider. Make sure you discuss any questions you have with your health care provider.  Document Released: 10/08/2014 Document Revised: 11/30/2018 Document Reviewed: 04/08/2017  Elsevier Patient Education © 2020 [employer]

## 2020-08-13 NOTE — DISCHARGE INSTRUCTIONS
Implanted Port Removal, Care After  This sheet gives you information about how to care for yourself after your procedure. Your health care provider may also give you more specific instructions. If you have problems or questions, contact your health care provider.  What can I expect after the procedure?  After the procedure, it is common to have:  · Soreness or pain near your incision.  · Some swelling or bruising near your incision.  Follow these instructions at home:  Medicines  · Take over-the-counter and prescription medicines only as told by your health care provider.  · If you were prescribed an antibiotic medicine, take it as told by your health care provider. Do not stop taking the antibiotic even if you start to feel better.  Bathing  · Do not take baths, swim, or use a hot tub until your health care provider approves. Ask your health care provider if you can take showers. You may only be allowed to take sponge baths.  Incision care    · Follow instructions from your health care provider about how to take care of your incision. Make sure you:  ? Wash your hands with soap and water before you change your bandage (dressing). If soap and water are not available, use hand .  ? Change your dressing as told by your health care provider.  ? Keep your dressing dry.  ? Leave stitches (sutures), skin glue, or adhesive strips in place. These skin closures may need to stay in place for 2 weeks or longer. If adhesive strip edges start to loosen and curl up, you may trim the loose edges. Do not remove adhesive strips completely unless your health care provider tells you to do that.  · Check your incision area every day for signs of infection. Check for:  ? More redness, swelling, or pain.  ? More fluid or blood.  ? Warmth.  ? Pus or a bad smell.  Driving    · Do not drive for 24 hours if you were given a medicine to help you relax (sedative) during your procedure.  · If you did not receive a sedative, ask your  health care provider when it is safe to drive.  Activity  · Return to your normal activities as told by your health care provider. Ask your health care provider what activities are safe for you.  · Do not lift anything that is heavier than 10 lb (4.5 kg), or the limit that you are told, until your health care provider says that it is safe.  · Do not do activities that involve lifting your arms over your head.  General instructions  · Do not use any products that contain nicotine or tobacco, such as cigarettes and e-cigarettes. These can delay healing. If you need help quitting, ask your health care provider.  · Keep all follow-up visits as told by your health care provider. This is important.  Contact a health care provider if:  · You have more redness, swelling, or pain around your incision.  · You have more fluid or blood coming from your incision.  · Your incision feels warm to the touch.  · You have pus or a bad smell coming from your incision.  · You have pain that is not relieved by your pain medicine.  Get help right away if you have:  · A fever or chills.  · Chest pain.  · Difficulty breathing.  Summary  · After the procedure, it is common to have pain, soreness, swelling, or bruising near your incision.  · If you were prescribed an antibiotic medicine, take it as told by your health care provider. Do not stop taking the antibiotic even if you start to feel better.  · Do not drive for 24 hours if you were given a sedative during your procedure.  · Return to your normal activities as told by your health care provider. Ask your health care provider what activities are safe for you.  This information is not intended to replace advice given to you by your health care provider. Make sure you discuss any questions you have with your health care provider.  Document Released: 11/28/2016 Document Revised: 01/31/2019 Document Reviewed: 01/31/2019  Elsevier Patient Education © 2020 [employer].        Moderate  Conscious Sedation, Adult, Care After  These instructions provide you with information about caring for yourself after your procedure. Your health care provider may also give you more specific instructions. Your treatment has been planned according to current medical practices, but problems sometimes occur. Call your health care provider if you have any problems or questions after your procedure.  What can I expect after the procedure?  After your procedure, it is common:  · To feel sleepy for several hours.  · To feel clumsy and have poor balance for several hours.  · To have poor judgment for several hours.  · To vomit if you eat too soon.  Follow these instructions at home:  For at least 24 hours after the procedure:    · Do not:  ? Participate in activities where you could fall or become injured.  ? Drive.  ? Use heavy machinery.  ? Drink alcohol.  ? Take sleeping pills or medicines that cause drowsiness.  ? Make important decisions or sign legal documents.  ? Take care of children on your own.  · Rest.  Eating and drinking  · Follow the diet recommended by your health care provider.  · If you vomit:  ? Drink water, juice, or soup when you can drink without vomiting.  ? Make sure you have little or no nausea before eating solid foods.  General instructions  · Have a responsible adult stay with you until you are awake and alert.  · Take over-the-counter and prescription medicines only as told by your health care provider.  · If you smoke, do not smoke without supervision.  · Keep all follow-up visits as told by your health care provider. This is important.  Contact a health care provider if:  · You keep feeling nauseous or you keep vomiting.  · You feel light-headed.  · You develop a rash.  · You have a fever.  Get help right away if:  · You have trouble breathing.  This information is not intended to replace advice given to you by your health care provider. Make sure you discuss any questions you have with your  health care provider.  Document Released: 10/08/2014 Document Revised: 11/30/2018 Document Reviewed: 04/08/2017  Elsevier Patient Education © 2020 [employer]     I have personally performed a face to face diagnostic evaluation on this patient. I have reviewed the ACP note and agree with the history, exam and plan of care, except as noted.

## 2020-08-13 NOTE — H&P
History and Physical    Date: 8/13/2020    PCP: Osei CABRERA M.D.      CC: Breast cancer not tolerating chemotx    HPI: This is a 75 y.o. female who is presenting with above    Past Medical History:   Diagnosis Date   • Anemia 1962   • Breast cancer (HCC)    • Cancer (HCC) 2011    right breast   • Cancer (HCC) 2020    lung    • COPD (chronic obstructive pulmonary disease) (HCC)    • Cough    • Dental disorder     upper and lower dentures   • EMPHYSEMA    • High cholesterol    • Pain     occasional breast pain   • Pneumonia 1948   • Renal disorder 1997    hx of stones   • Renal disorder     stones and cyst; 4/29/20 pt reports unaware of renal cyst    • S/P radiation therapy     for breast cancer 2011 @ [facility] Dr. Mccartney   • Small cell lung cancer (HCC)    • Snoring    • Sputum production    • Urinary incontinence        Past Surgical History:   Procedure Laterality Date   • PB BRONCHOSCOPY,DIAGNOSTIC  5/7/2020    Procedure: BRONCHOSCOPY-FIBER OPTIC WITH POSSIBLE WASH, BRUSH, BROCHOALVEOLAR LAVAGE, BIOPSY FINE NEEDLE ASPIRATION;  Surgeon: Peter Foster M.D.;  Location: [facility];  Service: Pulmonary   • ENDOBRONCHIAL US ADD-ON  5/7/2020    Procedure: ENDOBRONCHIAL ULTRASOUND (EBUS);  Surgeon: Peter Foster M.D.;  Location: [facility];  Service: Pulmonary   • COLONOSCOPY WITH BIOPSY  3/15/2012    Performed by RC FANG at [facility]   • BREAST BIOPSY  8/23/2011    Performed by MOLLY PARRA at SURGERY SAME DAY [facility] ORS   • NODE BIOPSY SENTINEL  8/8/2011    Performed by MOLLY PARRA at SURGERY SAME DAY [facility] ORS   • LUMPECTOMY  8/8/2011    Performed by MOLLY PARRA at SURGERY SAME DAY [facility] ORS   • TONSILLECTOMY  1972   • PB RADIATION THERAPY PLAN SIMPLE         Current Facility-Administered Medications   Medication Dose Route Frequency Provider Last Rate Last Dose   • NS infusion   Intravenous Continuous Tabithaemeka Saunders A.P.NGuillaume       •  LIDOCAINE-EPINEPHRINE 1 %-[id number] INJ SOLN                 Social History     Socioeconomic History   • Marital status:      Spouse name: Not on file   • Number of children: Not on file   • Years of education: Not on file   • Highest education level: Not on file   Occupational History   • Not on file   Social Needs   • Financial resource strain: Not on file   • Food insecurity     Worry: Patient refused     Inability: Patient refused   • Transportation needs     Medical: Not on file     Non-medical: Not on file   Tobacco Use   • Smoking status: Current Every Day Smoker     Packs/day: 1.00     Years: 58.00     Pack years: 58.00     Types: Cigarettes     Start date: 1962   • Smokeless tobacco: Never Used   Substance and Sexual Activity   • Alcohol use: Yes     Frequency: 2-3 times a week     Drinks per session: 1 or 2     Comment: 4 per week   • Drug use: No   • Sexual activity: Not on file   Lifestyle   • Physical activity     Days per week: Not on file     Minutes per session: Not on file   • Stress: Not on file   Relationships   • Social connections     Talks on phone: Not on file     Gets together: Not on file     Attends Restorationist service: Not on file     Active member of club or organization: Not on file     Attends meetings of clubs or organizations: Not on file     Relationship status: Not on file   • Intimate partner violence     Fear of current or ex partner: Not on file     Emotionally abused: Not on file     Physically abused: Not on file     Forced sexual activity: Not on file   Other Topics Concern   • Not on file   Social History Narrative    ** Merged History Encounter **            Family History   Problem Relation Age of Onset   • Stroke Other    • Heart Disease Other    • Cancer Mother         lung cancer   • Heart Disease Father    • Cancer Sister         lung cancer   • Cancer Sister         lung cancer   • Cancer Sister         unknown cancer       Allergies:  Flu virus vaccine    Review  of Systems:  Negative     Physical Exam   Constitutional: She is oriented to person, place, and time. She appears well-developed and well-nourished. No distress.   HENT:   Head: Normocephalic and atraumatic.   Mouth/Throat: No oropharyngeal exudate.   Eyes: Right eye exhibits no discharge. Left eye exhibits no discharge. No scleral icterus.   Neck: Neck supple.   Cardiovascular: Normal rate.   Pulmonary/Chest: Effort normal.   Abdominal: Soft.   Neurological: She is alert and oriented to person, place, and time.   Skin: Skin is warm and dry. She is not diaphoretic.   Psychiatric: She has a normal mood and affect. Her behavior is normal. Judgment and thought content normal.       Vital Signs                           Labs:                    Radiology:  IR-CVC TUNNEL WITH PORT REMOVAL    (Results Pending)             Assessment and Plan:This is a 75 y.o. here for port removal

## 2020-08-13 NOTE — PROGRESS NOTES
OP[facility] RN note    Patient underwent a Tunneled port removal by Dr. Guzman.  Procedure site was verified by MD using imaging guidance. Consent was signed.  IR tech Armond and Vadim assisted. Patient was placed in a supine position.  Vitals were taken every 5 minutes and remained stable during procedure (see doc flow sheet for results).  CO2 waveform capnography was monitored throughout procedure, see chart.  Right chest access site.  Internal sutures, derma bond, steri strips, gauze and tegaderm dressing was placed over surgical site. Pt recovers in OPIR,  with monitor .   Reviewed sedation orders with MD ARCHULETA procedure ended at 1040

## 2020-08-21 ENCOUNTER — DOCUMENTATION (OUTPATIENT)
Dept: OTHER | Facility: MEDICAL CENTER | Age: 75
End: 2020-08-21

## 2020-08-21 NOTE — PROGRESS NOTES
Pt has decided not to pursue further treatment at this time.  No need for cancer nurse navigation.  Will sign off.  Available if needed.

## 2020-10-01 ENCOUNTER — OFFICE VISIT (OUTPATIENT)
Dept: PULMONOLOGY | Facility: HOSPICE | Age: 75
End: 2020-10-01
Payer: COMMERCIAL

## 2020-10-01 VITALS
DIASTOLIC BLOOD PRESSURE: 70 MMHG | WEIGHT: 156.6 LBS | HEIGHT: 63 IN | RESPIRATION RATE: 17 BRPM | SYSTOLIC BLOOD PRESSURE: 118 MMHG | HEART RATE: 86 BPM | BODY MASS INDEX: 27.75 KG/M2 | OXYGEN SATURATION: 96 %

## 2020-10-01 DIAGNOSIS — R91.8 PULMONARY NODULES: ICD-10-CM

## 2020-10-01 DIAGNOSIS — J44.89 COPD WITH ASTHMA (HCC): ICD-10-CM

## 2020-10-01 DIAGNOSIS — C34.90 SMALL CELL LUNG CANCER (HCC): ICD-10-CM

## 2020-10-01 PROCEDURE — 99214 OFFICE O/P EST MOD 30 MIN: CPT | Performed by: INTERNAL MEDICINE

## 2020-10-01 NOTE — PATIENT INSTRUCTIONS
Emily comes in today to follow-up on her small cell lung cancer, limited stage, she underwent radiation in July.  She tolerated that fairly well.  However subsequent chemotherapy was not tolerated well, she found it overwhelming, after 2 treatments stopped.  She developed peripheral neuropathy, still has difficulty in the morning initiating movement, uses anti-inflammatories.    Remarkably she has returned to work now at the [employer], previously [employer], does experience some increased early fatigue.  She plans to get a vitamin B12 shot, I do not see any contraindication to that.  Pulmonary status remains stable.  PET scan is being done in late October.  I will check her again in 3 to 4 months.

## 2020-10-01 NOTE — PROGRESS NOTES
Emily Barnes is a 75 y.o. female here for small cell cancer post radiation therapy and chemotherapy. Patient was referred by primary care.    History of Present Illness:      Emily comes in today to follow-up on her small cell lung cancer, limited stage, she underwent radiation in July.  She tolerated that fairly well.  However subsequent chemotherapy was not tolerated well, she found it overwhelming, after 2 treatments stopped.  She developed peripheral neuropathy, still has difficulty in the morning initiating movement, uses anti-inflammatories.    Remarkably she has returned to work now at the [employer], previously [employer], does experience some increased early fatigue.  She plans to get a vitamin B12 shot, I do not see any contraindication to that.  Pulmonary status remains stable.  PET scan is being done in late October.  I will check her again in 3 to 4 months.  Constitutional ROS: No unexpected change in weight, No unexplained fevers  Eyes: No change in vision or blurring or double vision  Mouth/Throat ROS: No sore throat, No recent change in voice or hoarseness  Pulmonary ROS: See present history for pertinent positives  Cardiovascular ROS: No chest pain to suggest acute coronary syndrome  Gastrointestinal ROS: No abdominal pain to suggest peptic disease  Musculoskeletal/Extremities ROS: no acute artritis or unusual swelling  Hematologic/Lymphatic ROS: No easy bleeding or unusual lymph node swelling  Neurologic ROS: No new or unusual weakness  Psychiatric ROS: No hallucinations  Allergic/Immunologic: No  urticaria or allergic rash      Current Outpatient Medications   Medication Sig Dispense Refill   • magnesium oxide (MAG-OX) 400 MG Tab tablet Take 400 mg by mouth.     • lidocaine-prilocaine (EMLA) 2.5-2.5 % Cream APPLY A SMALL AMOUNT OF CREAM TO PORT SITE AN HOUR PRIOR TO TREATMENT     • Cholecalciferol (VITAMIN D) 50 MCG (2000 UT) Cap Take 4,000 Units by mouth every day.     •  Pseudoeph-Doxylamine-DM-APAP (NYQUIL PO) Take  by mouth as needed. Takes prn for sleep approx twice a month     • atorvastatin (LIPITOR) 10 MG Tab Take 10 mg by mouth every day. Takes in the afternoon     • denosumab (PROLIA) 60 MG/ML Solution Prefilled Syringe Inject 60 mg as instructed Once. Twice a year     • anastrozole (ARIMIDEX) 1 MG TABS Take 1 mg by mouth every day. Takes in the afternoon     • zinc sulfate (ZINCATE) 220 (50 Zn) MG Cap Take 220 mg by mouth every day.       No current facility-administered medications for this visit.        Social History     Tobacco Use   • Smoking status: Current Every Day Smoker     Packs/day: 1.00     Years: 58.00     Pack years: 58.00     Types: Cigarettes     Start date: 1962   • Smokeless tobacco: Never Used   Substance Use Topics   • Alcohol use: Yes     Frequency: 2-3 times a week     Drinks per session: 1 or 2     Comment: 4 per week   • Drug use: No        Past Medical History:   Diagnosis Date   • Anemia 1962   • Breast cancer (HCC)    • Cancer (HCC) 2011    right breast   • Cancer (HCC) 2020    lung    • COPD (chronic obstructive pulmonary disease) (HCC)    • Cough    • Dental disorder     upper and lower dentures   • EMPHYSEMA    • High cholesterol    • Pain     occasional breast pain   • Pneumonia 1948   • Renal disorder 1997    hx of stones   • Renal disorder     stones and cyst; 4/29/20 pt reports unaware of renal cyst    • S/P radiation therapy     for breast cancer 2011 @ [facility] Dr. Mccartney   • Small cell lung cancer (HCC)    • Snoring    • Sputum production    • Urinary incontinence        Past Surgical History:   Procedure Laterality Date   • PB BRONCHOSCOPY,DIAGNOSTIC  5/7/2020    Procedure: BRONCHOSCOPY-FIBER OPTIC WITH POSSIBLE WASH, BRUSH, BROCHOALVEOLAR LAVAGE, BIOPSY FINE NEEDLE ASPIRATION;  Surgeon: Peter Foster M.D.;  Location: SURGERY [facility];  Service: Pulmonary   • ENDOBRONCHIAL US ADD-ON  5/7/2020    Procedure: ENDOBRONCHIAL  "ULTRASOUND (EBUS);  Surgeon: Peter Foster M.D.;  Location: SURGERY [facility];  Service: Pulmonary   • COLONOSCOPY WITH BIOPSY  3/15/2012    Performed by RC FANG at SURGERY [facility]   • BREAST BIOPSY  8/23/2011    Performed by MOLLY PARRA at SURGERY SAME DAY [facility] ORS   • NODE BIOPSY SENTINEL  8/8/2011    Performed by MOLLY PARRA at SURGERY SAME DAY [facility] ORS   • LUMPECTOMY  8/8/2011    Performed by MOLLY PARRA at SURGERY SAME DAY [facility] ORS   • TONSILLECTOMY  1972   • PB RADIATION THERAPY PLAN SIMPLE         Allergies: Flu virus vaccine    Family History   Problem Relation Age of Onset   • Stroke Other    • Heart Disease Other    • Cancer Mother         lung cancer   • Heart Disease Father    • Cancer Sister         lung cancer   • Cancer Sister         lung cancer   • Cancer Sister         unknown cancer       Physical Examination    Vitals:    10/01/20 1239 10/01/20 1243   Height: 1.588 m (5' 2.5\")    Weight: 71 kg (156 lb 9.6 oz)    Weight % change since last entry.: 0 %    BP: 118/70    Pulse: 86    BMI (Calculated): 28.19    Resp: 17    O2 sat % room air:  96 %       General Appearance: alert, no distress  Skin: Skin color, texture, turgor normal. No rashes or lesions.  Eyes: negative  Oropharynx: Lips, mucosa, and tongue normal. Teeth and gums normal. Oropharynx moist and without lesion  Lungs: positive findings: Quiet and clear without wheezes or rhonchi  Heart: negative. RRR without murmur, gallop, or rubs.  No ectopy.  Abdomen: Abdomen soft, non-tender. . No masses,  No organomegaly  Extremities:  No deformities, edema, or skin discoloration  Joints: No acute arthritis  Peripheral Pulses:perfused  Neurologic: intact grossly  Alopecia related to chemotherapy      Imaging: Recent chest CT scan showed the right hilar mass, changes post radiation and chemotherapy, now awaiting repeat PET scan    PFTS: None today      Assessment and Plan  1. Small cell lung cancer " (HCC)  Post radiation and 2 courses chemotherapy    2. COPD with asthma (HCC)  Not active no wheezes    3. Pulmonary nodule/R hilar mass  See above      Followup Return in about 4 months (around 2/1/2021).

## 2020-10-26 ENCOUNTER — HOSPITAL ENCOUNTER (OUTPATIENT)
Dept: RADIOLOGY | Facility: MEDICAL CENTER | Age: 75
End: 2020-10-26
Attending: RADIOLOGY
Payer: COMMERCIAL

## 2020-10-26 DIAGNOSIS — C34.90 SMALL CELL LUNG CANCER (HCC): ICD-10-CM

## 2020-10-26 PROCEDURE — A9552 F18 FDG: HCPCS

## 2020-10-28 ENCOUNTER — HOSPITAL ENCOUNTER (OUTPATIENT)
Dept: RADIOLOGY | Facility: MEDICAL CENTER | Age: 75
End: 2020-10-28
Attending: RADIOLOGY
Payer: COMMERCIAL

## 2020-10-28 DIAGNOSIS — C34.90 SMALL CELL LUNG CANCER (HCC): ICD-10-CM

## 2020-10-28 LAB
BUN BLD-MCNC: 16 MG/DL (ref 8–22)
CREAT BLD-MCNC: 0.7 MG/DL (ref 0.5–1.4)

## 2020-10-28 PROCEDURE — 70553 MRI BRAIN STEM W/O & W/DYE: CPT

## 2020-10-28 PROCEDURE — 700117 HCHG RX CONTRAST REV CODE 255: Performed by: RADIOLOGY

## 2020-10-28 PROCEDURE — A9576 INJ PROHANCE MULTIPACK: HCPCS | Performed by: RADIOLOGY

## 2020-10-28 PROCEDURE — 84520 ASSAY OF UREA NITROGEN: CPT

## 2020-10-28 PROCEDURE — 82565 ASSAY OF CREATININE: CPT

## 2020-10-28 RX ADMIN — GADOTERIDOL 15 ML: 279.3 INJECTION, SOLUTION INTRAVENOUS at 13:28

## 2020-11-06 ENCOUNTER — HOSPITAL ENCOUNTER (OUTPATIENT)
Dept: RADIATION ONCOLOGY | Facility: MEDICAL CENTER | Age: 75
End: 2020-11-30
Attending: RADIOLOGY
Payer: COMMERCIAL

## 2020-11-06 DIAGNOSIS — C34.31 SMALL CELL LUNG CANCER, RIGHT LOWER LOBE (HCC): ICD-10-CM

## 2020-11-06 DIAGNOSIS — R91.1 SOLITARY PULMONARY NODULE: ICD-10-CM

## 2020-11-06 DIAGNOSIS — C34.90 SMALL CELL LUNG CANCER (HCC): ICD-10-CM

## 2020-12-30 NOTE — PROGRESS NOTES
Called patient to order restaging MR brain and CT chest with contrast. Completed chemoradiation for limited stage small cell lung cancer, but declined further chemo or PCI to brain.

## 2020-12-31 ENCOUNTER — HOSPITAL ENCOUNTER (OUTPATIENT)
Dept: RADIOLOGY | Facility: MEDICAL CENTER | Age: 75
End: 2020-12-31
Attending: INTERNAL MEDICINE
Payer: COMMERCIAL

## 2020-12-31 DIAGNOSIS — C34.11 MALIGNANT NEOPLASM OF UPPER LOBE OF RIGHT LUNG (HCC): ICD-10-CM

## 2020-12-31 DIAGNOSIS — Z90.10 POSTMASTECTOMY LYMPHANGIOSARCOMA SYNDROME, UNSPECIFIED LATERALITY (HCC): ICD-10-CM

## 2020-12-31 DIAGNOSIS — Z12.31 ENCOUNTER FOR MAMMOGRAM TO ESTABLISH BASELINE MAMMOGRAM: ICD-10-CM

## 2020-12-31 DIAGNOSIS — C50.919 POSTMASTECTOMY LYMPHANGIOSARCOMA SYNDROME, UNSPECIFIED LATERALITY (HCC): ICD-10-CM

## 2020-12-31 PROCEDURE — 77063 BREAST TOMOSYNTHESIS BI: CPT

## 2021-01-06 ENCOUNTER — HOSPITAL ENCOUNTER (OUTPATIENT)
Dept: RADIOLOGY | Facility: MEDICAL CENTER | Age: 76
End: 2021-01-06
Attending: RADIOLOGY
Payer: COMMERCIAL

## 2021-01-06 DIAGNOSIS — C34.31 SMALL CELL LUNG CANCER, RIGHT LOWER LOBE (HCC): ICD-10-CM

## 2021-01-06 DIAGNOSIS — R91.1 SOLITARY PULMONARY NODULE: ICD-10-CM

## 2021-01-06 PROCEDURE — 700117 HCHG RX CONTRAST REV CODE 255: Performed by: RADIOLOGY

## 2021-01-06 PROCEDURE — A9576 INJ PROHANCE MULTIPACK: HCPCS | Performed by: RADIOLOGY

## 2021-01-06 PROCEDURE — 70553 MRI BRAIN STEM W/O & W/DYE: CPT

## 2021-01-06 PROCEDURE — 71260 CT THORAX DX C+: CPT

## 2021-01-06 RX ADMIN — IOHEXOL 75 ML: 350 INJECTION, SOLUTION INTRAVENOUS at 16:17

## 2021-01-06 RX ADMIN — GADOTERIDOL 15 ML: 279.3 INJECTION, SOLUTION INTRAVENOUS at 17:08

## 2021-01-08 ENCOUNTER — HOSPITAL ENCOUNTER (OUTPATIENT)
Dept: RADIATION ONCOLOGY | Facility: MEDICAL CENTER | Age: 76
End: 2021-01-31
Attending: RADIOLOGY
Payer: COMMERCIAL

## 2021-01-08 VITALS
OXYGEN SATURATION: 97 % | SYSTOLIC BLOOD PRESSURE: 160 MMHG | WEIGHT: 158.29 LBS | DIASTOLIC BLOOD PRESSURE: 95 MMHG | TEMPERATURE: 98.3 F | HEART RATE: 99 BPM | BODY MASS INDEX: 28.49 KG/M2

## 2021-01-08 DIAGNOSIS — C34.90 SMALL CELL LUNG CANCER (HCC): ICD-10-CM

## 2021-01-08 PROCEDURE — 99212 OFFICE O/P EST SF 10 MIN: CPT | Performed by: RADIOLOGY

## 2021-01-08 PROCEDURE — 99213 OFFICE O/P EST LOW 20 MIN: CPT | Performed by: RADIOLOGY

## 2021-01-08 RX ORDER — CYCLOBENZAPRINE HCL 5 MG
5 TABLET ORAL 3 TIMES DAILY PRN
Qty: 30 TAB | Refills: 0 | Status: SHIPPED | OUTPATIENT
Start: 2021-01-08 | End: 2022-10-14

## 2021-01-08 ASSESSMENT — PAIN SCALES - GENERAL: PAINLEVEL: 10=SEVERE PAIN

## 2021-01-08 NOTE — PROGRESS NOTES
RADIATION ONCOLOGY FOLLOW-UP    DATE OF SERVICE: 1/8/2021    IDENTIFICATION:   A 75 y.o. female with limited stage SCLC of right hilum s/p chemoRT 45Gy in 30 fractions BID completed 7/24/20   Small cell lung cancer (HCC)  Staging form: Lung, AJCC 8th Edition  - Clinical stage from 5/18/2020: Stage IIB (cT1c, cN1, cM0) - Signed by William Cruz M.D. on 5/18/2020  Type of lung cancer: Small cell lung cancer      HISTORY OF PRESENT ILLNESS:   Patient originally presented with abnormal CT scan finding in July 18 2018 from her breast cancer surveillance.  It was grossly stable until December 11, 2019 which showed slight increase in size of right hilar bunny tissue now measuring 2.4 x 1.5 cm with no evidence of primary small cell lung cancer.  Patient had her screening mammogram December 31, 2019 which showed no evidence of latency.  Patient was seen by Dr. Crain in pulmonary who ordered a PET CT scan in April 6, 2020 which showed FDG avid right hilar lymph node concerning for metastatic disease.  Patient underwent NIKOLAS guided biopsy by Dr. Foster with 11 R and 10 R sampled showing small cell lung cancer TTF-1 positive synaptophysin positive and chromogranin positive with KATHERIN-3 negative.  MRI brain was performed today May 18, 2020 and per my read shows no evidence of brain metastasis.  She is currently asymptomatic without any pain and is not on oxygen she denies any shortness of breath but does have a clear cough with no hemoptysis.    11/6/20  Called patient to order restaging MR brain and CT chest with contrast. Completed chemoradiation for limited stage small cell lung cancer, but declined further chemo or PCI to brain.    INTERVAL HISTORY:  Patient doing well with increased energy and appetite. CT chest and MRI brain shows shrinkage of right hilar mass small 3 mm left upper lobe nodule nonspecific and no evidence of brain metastasis.  She denies any cough, shortness of breath or hemoptysis or weight  loss.      PROBLEM LIST:  Patient Active Problem List   Diagnosis   • Pulmonary nodule/R hilar mass   • Breast cancer    • Small cell lung cancer (HCC)   • COPD with asthma (HCC)       CURRENT MEDICATIONS:  Current Outpatient Medications   Medication Sig Dispense Refill   • zinc sulfate (ZINCATE) 220 (50 Zn) MG Cap Take 220 mg by mouth every day.     • Cholecalciferol (VITAMIN D) 50 MCG (2000 UT) Cap Take 4,000 Units by mouth every day.     • Pseudoeph-Doxylamine-DM-APAP (NYQUIL PO) Take  by mouth as needed. Takes prn for sleep approx twice a month     • atorvastatin (LIPITOR) 10 MG Tab Take 10 mg by mouth every day. Takes in the afternoon     • denosumab (PROLIA) 60 MG/ML Solution Prefilled Syringe Inject 60 mg as instructed Once. Twice a year     • anastrozole (ARIMIDEX) 1 MG TABS Take 1 mg by mouth every day. Takes in the afternoon     • magnesium oxide (MAG-OX) 400 MG Tab tablet Take 400 mg by mouth.     • lidocaine-prilocaine (EMLA) 2.5-2.5 % Cream APPLY A SMALL AMOUNT OF CREAM TO PORT SITE AN HOUR PRIOR TO TREATMENT       No current facility-administered medications for this encounter.        ALLERGIES:  Flu virus vaccine    REVIEW OF SYSTEMS:  A review of systems for today's date of service was reviewed and uploaded into the electronic medical record.    PHYSICAL EXAM:  PERFORMANCE STATUS:  No flowsheet data found.  Karnofsky Score 1/8/2021 7/23/2020 7/22/2020   Karnofsky Score 70 70 70   Some recent data might be hidden     /95 (BP Location: Left arm, Patient Position: Sitting, BP Cuff Size: Adult)   Pulse 99   Temp 36.8 °C (98.3 °F)   Wt 71.8 kg (158 lb 4.6 oz)   SpO2 97%   BMI 28.49 kg/m²   Physical Exam  Vitals signs reviewed.   Constitutional:       Appearance: Normal appearance.   HENT:      Head: Normocephalic and atraumatic.      Nose: Nose normal.      Mouth/Throat:      Mouth: Mucous membranes are moist.   Eyes:      Pupils: Pupils are equal, round, and reactive to light.   Neck:       Musculoskeletal: Normal range of motion.   Cardiovascular:      Rate and Rhythm: Normal rate.   Pulmonary:      Effort: Pulmonary effort is normal.   Abdominal:      General: Abdomen is flat.   Musculoskeletal: Normal range of motion.   Neurological:      General: No focal deficit present.      Mental Status: She is alert.   Psychiatric:         Mood and Affect: Mood normal.         LABORATORY DATA:   Lab Results   Component Value Date/Time    WBC 7.2 07/27/2017 1235    WBC 9.1 03/16/2012 2330    WBC 7.4 03/15/2012 0620    HEMOGLOBIN 15.7 07/27/2017 1235    HEMOGLOBIN 13.8 03/16/2012 2330    HEMOGLOBIN 14.5 03/15/2012 0620    HEMATOCRIT 47.0 07/27/2017 1235    HEMATOCRIT 42.1 03/16/2012 2330    HEMATOCRIT 43.4 03/15/2012 0620    MCV 95.1 07/27/2017 1235    MCV 93.7 03/16/2012 2330    MCV 92.7 03/15/2012 0620    PLATELETCT 199 07/27/2017 1235    PLATELETCT 278 03/16/2012 2330    PLATELETCT 239 03/15/2012 0620    NEUTS 5.09 07/27/2017 1235      Lab Results   Component Value Date/Time    SODIUM 138 05/16/2020 0857    SODIUM 143 07/27/2017 1235    SODIUM 136 03/15/2012 0620    POTASSIUM 3.8 05/16/2020 0857    POTASSIUM 4.7 07/27/2017 1235    POTASSIUM 3.6 03/15/2012 0620    BUN 13 05/16/2020 0857    BUN 12 07/27/2017 1235    BUN 15 05/11/2016 1308    CREATININE 0.75 05/16/2020 0857    CREATININE 0.84 07/27/2017 1235    CREATININE 0.78 05/11/2016 1308    CALCIUM 8.9 05/16/2020 0857    CALCIUM 9.5 07/27/2017 1235    CALCIUM 8.8 03/15/2012 0620    ALBUMIN 4.4 07/27/2017 1235    ASTSGOT 20 07/27/2017 1235    ALKPHOSPHAT 54 07/27/2017 1235    IFNOTAFR >60 05/16/2020 0857    IFNOTAFR >60 07/27/2017 1235    IFNOTAFR >60 05/11/2016 1308       RADIOLOGY DATA:  Ct-chest (thorax) With    Result Date: 1/6/2021 1/6/2021 4:10 PM HISTORY/REASON FOR EXAM:  Lung nodule follow-up. TECHNIQUE/EXAM DESCRIPTION: CT scan of the chest with contrast. Thin-section helical images were obtained from the lung apices through the adrenal glands  following the bolus administration of contrast. 75 mL of Omnipaque 350 nonionic contrast was utilized. Low dose optimization technique was utilized for this CT exam including automated exposure control and adjustment of the mA and/or kV according to patient size. COMPARISON:  6/5/2020. FINDINGS: Lungs: Emphysema. Right hilar mass is significantly smaller, now measuring 1.5 x 1 cm. Tiny granuloma in the right upper lobe. A tiny nodule in the right upper lobe measuring 3 mm appears new compared to prior study. No suspicious nodules or masses. No pleural effusions. Mediastinum: Unremarkable thyroid. No mediastinal mass. Nodes: Right hilar mass is significantly smaller, now measuring 1.5 x 1 cm. No enlarged thoracic lymph nodes by size criteria. Heart: Not enlarged. No pericardial effusion. Coronary calcifications. Aorta and great vessels: Partially visualized 3.9 x 3.6 cm infrarenal abdominal aortic aneurysm. No thoracic aortic aneurysm. Atherosclerosis. Musculoskeletal structures: No acute fracture or destructive lesion. Of old rib fractures. Upper abdomen: Tiny cyst in the left hepatic lobe, benign. Stable left adrenal adenoma. Unchanged cystic structure in the deep right breast measuring 2.2 x 1.7 cm, likely postsurgical seroma.     1. Right hilar mass is much smaller compared to prior study, now 1.5 x 1 cm. 2. Tiny 3 mm nodule in the right upper lobe is new since prior study, indeterminate. It can be followed. 3. No other pulmonary nodules or masses. 4. Emphysema. 5. Partially visualized 3.9 cm infrarenal abdominal aortic aneurysm. 6. Stable seroma in the right breast. Fleischner Society pulmonary nodule recommendations:     Mr-brain-with & W/o    Result Date: 1/7/2021 1/6/2021 4:45 PM HISTORY/REASON FOR EXAM:  Small cell lung cancer, monitor. TECHNIQUE/EXAM DESCRIPTION:   MRI of the brain without and with contrast. T1 3D TFE sagittal, T2 DRIVE turbo spin-echo axial, T1 coronal, 3D thin-section FLAIR with coronal  and optional axial, sagittal reconstructions, diffusion-weighted and apparent diffusion coefficient (ADC map) axial images were obtained of the whole brain. T1-weighted 3D KAYLIN postcontrast axial and T1-weighted postcontrast coronal images were obtained. The study was performed on a [Adryan 3.0 Verna] MRI scanner. 15 mL ProHance contrast was administered intravenously. COMPARISON:  MRI scan of the brain 10/28/2020 FINDINGS: The calvariae are unremarkable. There are no extra-axial fluid collections. The ventricular system and basal cisterns are mild to moderately prominent. There is mild-to-moderate prominence the cortical sulci and gyri. There are patchy and confluent areas  of increased T2 signal intensity throughout the periventricular, juxtacortical, and pontine white matter. There is an ovoid region of decreased T1 and increased T2 signal intensity in the left thalamus. There are no mass effects or shift of midline structures. There are no hemorrhagic lesions. The diffusion-weighted axial images show no evidence of acute cerebral infarction. The postcontrast images show no areas of abnormal parenchymal or meningeal enhancement. The brainstem and posterior fossa structures are unremarkable. Vascular flow voids in the vertebrobasilar and carotid arteries, [place], and dural venous sinuses are intact. The paranasal sinuses and mastoids in the field of view are unremarkable.     1.  Age-related cerebral atrophy. 2.  Extensive periventricular, juxtacortical, and pontine white matter changes consistent with chronic microvascular ischemic gliosis. 3.  Prominent chronic area of lacunar-type infarction involving the left thalamus. 4.  No evidence of metastatic disease to the brain parenchyma.    Ma-screening Mammo Bilat W/tomosynthesis W/cad    Result Date: 12/31/2020 12/31/2020 1:24 PM HISTORY/REASON FOR EXAM:  Routine Mammographic Screening. History of right breast cancer. Prior lumpectomy. TECHNIQUE/EXAM  DESCRIPTION: Bilateral tomosynthesis screening mammography was performed with standard mammographic images generated from the data set. Images were reviewed and interpreted with CAD. COMPARISON:   Mammogram 12/30/2019 and 12/28/2018 FINDINGS: There are scattered areas of fibroglandular density. There is no dominant mass, suspicious calcification, or any secondary malignant sign. Right breast shows loss of volume with architecture distortion consistent with prior lumpectomy. There are a few stable benign-appearing left calcification.     1.  Breasts have scattered areas of fibroglandular density, with no radiographic evidence of malignancy and no interval change. 2.  Screening mammogram in one year is recommended. R2- Category 2:  Benign Finding(s)      IMPRESSION:    75 y.o. female with limited stage SCLC of right hilum s/p chemoRT 45Gy in 30 fractions BID completed 7/24/20   Small cell lung cancer (HCC)  Staging form: Lung, AJCC 8th Edition  - Clinical stage from 5/18/2020: Stage IIB (cT1c, cN1, cM0) - Signed by William Cruz M.D. on 5/18/2020  Type of lung cancer: Small cell lung cancer      CANCER STATUS:  No Evidence of Disease    RECOMMENDATIONS:   I reviewed with patient her recent CT chest scan which is shrinkage of the right hilar mass.  She has no evidence of brain metastasis.  I have recommended repeat CT chest abdomen pelvis and brain MRI in 3 months.  In terms of her right thigh pain I am not entirely sure the cause although she relates it to chemotherapy I did recommend that she see her primary doctor or a physiatrist but she does not want to see other doctors in the meantime she is asking for a muscle relaxant which have given her a 1 month supply of Flexeril to try but wonder that in geriatric patients that this could cause drowsiness.  She will follow-up in 3 months.    Thank you for the opportunity to participate in her care.  If any questions or comments, please do not hesitate in  paul.    CC: Dr. Alcantar

## 2021-01-08 NOTE — NON-PROVIDER
Patient was seen today in clinic with Dr. Cruz for follow up.  Vitals signs and weight were obtained and pain assessment was completed.  Allergies and medications were reviewed with the patient.  Review of systems completed.     Vitals/Pain:  Vitals:    01/08/21 1351   BP: 160/95   BP Location: Left arm   Patient Position: Sitting   BP Cuff Size: Adult   Pulse: 99   Temp: 36.8 °C (98.3 °F)   SpO2: 97%   Weight: 71.8 kg (158 lb 4.6 oz)   Pain Score: 10=Severe Pain        Allergies:   Flu virus vaccine    Current Medications:  Current Outpatient Medications   Medication Sig Dispense Refill   • zinc sulfate (ZINCATE) 220 (50 Zn) MG Cap Take 220 mg by mouth every day.     • Cholecalciferol (VITAMIN D) 50 MCG (2000 UT) Cap Take 4,000 Units by mouth every day.     • Pseudoeph-Doxylamine-DM-APAP (NYQUIL PO) Take  by mouth as needed. Takes prn for sleep approx twice a month     • atorvastatin (LIPITOR) 10 MG Tab Take 10 mg by mouth every day. Takes in the afternoon     • denosumab (PROLIA) 60 MG/ML Solution Prefilled Syringe Inject 60 mg as instructed Once. Twice a year     • anastrozole (ARIMIDEX) 1 MG TABS Take 1 mg by mouth every day. Takes in the afternoon     • magnesium oxide (MAG-OX) 400 MG Tab tablet Take 400 mg by mouth.     • lidocaine-prilocaine (EMLA) 2.5-2.5 % Cream APPLY A SMALL AMOUNT OF CREAM TO PORT SITE AN HOUR PRIOR TO TREATMENT       No current facility-administered medications for this encounter.          PCP:  Babita Martinez, Med Ass't

## 2021-01-15 DIAGNOSIS — Z23 NEED FOR VACCINATION: ICD-10-CM

## 2021-02-11 ENCOUNTER — OFFICE VISIT (OUTPATIENT)
Dept: SLEEP MEDICINE | Facility: MEDICAL CENTER | Age: 76
End: 2021-02-11
Payer: COMMERCIAL

## 2021-02-11 VITALS
HEART RATE: 90 BPM | HEIGHT: 63 IN | RESPIRATION RATE: 16 BRPM | BODY MASS INDEX: 27.46 KG/M2 | DIASTOLIC BLOOD PRESSURE: 82 MMHG | WEIGHT: 155 LBS | SYSTOLIC BLOOD PRESSURE: 148 MMHG | TEMPERATURE: 97.6 F | OXYGEN SATURATION: 98 %

## 2021-02-11 DIAGNOSIS — J44.89 COPD WITH ASTHMA (HCC): ICD-10-CM

## 2021-02-11 DIAGNOSIS — C34.90 SMALL CELL LUNG CANCER (HCC): ICD-10-CM

## 2021-02-11 DIAGNOSIS — R91.8 PULMONARY NODULES: ICD-10-CM

## 2021-02-11 PROCEDURE — 99214 OFFICE O/P EST MOD 30 MIN: CPT | Performed by: INTERNAL MEDICINE

## 2021-02-11 ASSESSMENT — PAIN SCALES - GENERAL: PAINLEVEL: 6=MODERATE PAIN

## 2021-02-11 NOTE — PROGRESS NOTES
Emily Barnes is a 75 y.o. 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 Patient was referred by primary care.    History of Present Illness: As follows  Emily comes in today with her remarkable sense of humor, brings me a sample of  [place] soda non alcoholic.  She has completed her radiation therapy, chemotherapy took 2 doses, she indicates it caused her curly hair and some right leg neuropathy.  Her recent scans were encouraging, the chest mass has shrunk substantially and presently she has cough some sputum production no blood and is holding her weight.  Remarkably she is concerned about gaining weight, I have asked her to maintain as much as she can to preserve her immunity.    She does not require inhalers has not had bronchospasm has not required oxygen and remarkably is back to working at [employer].  She is generally doing well, from our standpoint flu vaccine will not be administered as she has a flu virus allergy, she is awaiting the Covid vaccine when available.  We can recheck in 6 months, sooner if needed  Constitutional ROS: No unexpected change in weight, No unexplained fevers  Eyes: No change in vision or blurring or double vision  Mouth/Throat ROS: No sore throat, No recent change in voice or hoarseness  Pulmonary ROS: See present history for pertinent positives  Cardiovascular ROS: No chest pain to suggest acute coronary syndrome  Gastrointestinal ROS: No abdominal pain to suggest peptic disease  Musculoskeletal/Extremities ROS: no acute artritis or unusual swelling  Hematologic/Lymphatic ROS: No easy bleeding or unusual lymph node swelling  Neurologic ROS: No new or unusual weakness  Psychiatric ROS: No hallucinations  Allergic/Immunologic: No  urticaria or allergic rash      Current Outpatient Medications   Medication Sig Dispense Refill   • cyclobenzaprine (FLEXERIL) 5 mg tablet Take 1 Tab by mouth 3 times a day as needed. 30 Tab 0   • magnesium oxide (MAG-OX) 400 MG  Tab tablet Take 400 mg by mouth.     • zinc sulfate (ZINCATE) 220 (50 Zn) MG Cap Take 220 mg by mouth every day.     • lidocaine-prilocaine (EMLA) 2.5-2.5 % Cream APPLY A SMALL AMOUNT OF CREAM TO PORT SITE AN HOUR PRIOR TO TREATMENT     • Cholecalciferol (VITAMIN D) 50 MCG (2000 UT) Cap Take 4,000 Units by mouth every day.     • Pseudoeph-Doxylamine-DM-APAP (NYQUIL PO) Take  by mouth as needed. Takes prn for sleep approx twice a month     • atorvastatin (LIPITOR) 10 MG Tab Take 10 mg by mouth every day. Takes in the afternoon     • denosumab (PROLIA) 60 MG/ML Solution Prefilled Syringe Inject 60 mg as instructed Once. Twice a year     • anastrozole (ARIMIDEX) 1 MG TABS Take 1 mg by mouth every day. Takes in the afternoon       No current facility-administered medications for this visit.       Social History     Tobacco Use   • Smoking status: Current Every Day Smoker     Packs/day: 1.00     Years: 58.00     Pack years: 58.00     Types: Cigarettes     Start date: 1962   • Smokeless tobacco: Never Used   Substance Use Topics   • Alcohol use: Yes     Comment: 4 per week   • Drug use: No        Past Medical History:   Diagnosis Date   • Anemia 1962   • Breast cancer (HCC)    • Cancer (HCC) 2011    right breast   • Cancer (HCC) 2020    lung    • COPD (chronic obstructive pulmonary disease) (HCC)    • Cough    • Dental disorder     upper and lower dentures   • EMPHYSEMA    • High cholesterol    • Pain     occasional breast pain   • Pneumonia 1948   • Renal disorder 1997    hx of stones   • Renal disorder     stones and cyst; 4/29/20 pt reports unaware of renal cyst    • S/P radiation therapy     [facility] Dr. Mccartney   • Small cell lung cancer (HCC)    • Snoring    • Sputum production    • Urinary incontinence        Past Surgical History:   Procedure Laterality Date   • PB BRONCHOSCOPY,DIAGNOSTIC  5/7/2020    Procedure: BRONCHOSCOPY-FIBER OPTIC WITH POSSIBLE WASH, BRUSH, BROCHOALVEOLAR LAVAGE, BIOPSY  "FINE NEEDLE ASPIRATION;  Surgeon: Peter Foster M.D.;  Location: SURGERY [facility];  Service: Pulmonary   • ENDOBRONCHIAL US ADD-ON  5/7/2020    Procedure: ENDOBRONCHIAL ULTRASOUND (EBUS);  Surgeon: Peter Foster M.D.;  Location: [facility];  Service: Pulmonary   • COLONOSCOPY WITH BIOPSY  3/15/2012    Performed by RC FANG at SURGERY [facility]   • BREAST BIOPSY  8/23/2011    Performed by MOLLY PARRA at SURGERY SAME DAY [facility]   • NODE BIOPSY SENTINEL  8/8/2011    Performed by MOLLY PARRA at SURGERY SAME DAY [facility]   • LUMPECTOMY  8/8/2011    Performed by MOLLY PARRA at SURGERY SAME DAY [facility]   • TONSILLECTOMY  1972   • PB RADIATION THERAPY PLAN SIMPLE         Allergies: Flu virus vaccine    Family History   Problem Relation Age of Onset   • Stroke Other    • Heart Disease Other    • Cancer Mother         lung cancer   • Heart Disease Father    • Cancer Sister         lung cancer   • Cancer Sister         lung cancer   • Cancer Sister         unknown cancer       Physical Examination    Vitals:    02/11/21 1424   Height: 1.6 m (5' 3\")   Weight: 70.3 kg (155 lb)   Weight % change since last entry.: 0 %   BP: 148/82   Pulse: 90   BMI (Calculated): 27.46   Resp: 16   Temp: 36.4 °C (97.6 °F)   TempSrc: Temporal       General Appearance: alert, no distress  Skin: Skin color, texture, turgor normal. No rashes or lesions.  Eyes: negative  Oropharynx: Mask in place  Lungs: positive findings: Quiet and clear  Heart: negative. RRR without murmur, gallop, or rubs.  No ectopy.  Abdomen: Abdomen soft, non-tender. . No masses,  No organomegaly  Extremities:  No deformities, edema, or skin discoloration  Joints: No acute arthritis  Peripheral Pulses:perfused  Neurologic: intact grossly  Cervical adenopathy      Imaging: Recent chest CT scan shows decrease in right lung density size    PFTS: Not needed      Assessment and Plan  1. Small cell lung cancer " (HCC)  Treatment    2. Pulmonary nodule/R hilar mass  Additional current imaging    3. COPD with asthma (HCC)  No active bronchospasm that requires inhalers      Followup Return in about 6 months (around 8/11/2021) for follow up visit with Dr. Jazmín Crain.

## 2021-02-11 NOTE — PATIENT INSTRUCTIONS
Emily comes in today with her remarkable sense of humor, brings me a sample of  [facility] soda non alcoholic.  She has completed her radiation therapy, chemotherapy took 2 doses, she indicates it caused her curly hair and some right leg neuropathy.  Her recent scans were encouraging, the chest mass has shrunk substantially and presently she has cough some sputum production no blood and is holding her weight.  Remarkably she is concerned about gaining weight, I have asked her to maintain as much as she can to preserve her immunity.    She does not require inhalers has not had bronchospasm has not required oxygen and remarkably is back to working at [employer].  She is generally doing well, from our standpoint flu vaccine will not be administered as she has a flu virus allergy, she is awaiting the Covid vaccine when available.  We can recheck in 6 months, sooner if needed

## 2021-04-12 ENCOUNTER — HOSPITAL ENCOUNTER (OUTPATIENT)
Dept: RADIOLOGY | Facility: MEDICAL CENTER | Age: 76
End: 2021-04-12
Attending: RADIOLOGY
Payer: COMMERCIAL

## 2021-04-12 ENCOUNTER — HOSPITAL ENCOUNTER (OUTPATIENT)
Dept: RADIATION ONCOLOGY | Facility: MEDICAL CENTER | Age: 76
End: 2021-04-30
Attending: RADIOLOGY
Payer: COMMERCIAL

## 2021-04-12 VITALS
WEIGHT: 153.88 LBS | HEART RATE: 96 BPM | SYSTOLIC BLOOD PRESSURE: 150 MMHG | TEMPERATURE: 97.6 F | DIASTOLIC BLOOD PRESSURE: 85 MMHG | OXYGEN SATURATION: 96 % | BODY MASS INDEX: 27.26 KG/M2

## 2021-04-12 DIAGNOSIS — C34.90 SMALL CELL LUNG CANCER (HCC): ICD-10-CM

## 2021-04-12 DIAGNOSIS — C34.31 SQUAMOUS CELL CARCINOMA OF BRONCHUS IN RIGHT LOWER LOBE (HCC): ICD-10-CM

## 2021-04-12 PROCEDURE — 99212 OFFICE O/P EST SF 10 MIN: CPT | Performed by: RADIOLOGY

## 2021-04-12 PROCEDURE — 99214 OFFICE O/P EST MOD 30 MIN: CPT | Performed by: RADIOLOGY

## 2021-04-12 PROCEDURE — A9576 INJ PROHANCE MULTIPACK: HCPCS | Performed by: RADIOLOGY

## 2021-04-12 PROCEDURE — 700117 HCHG RX CONTRAST REV CODE 255: Performed by: RADIOLOGY

## 2021-04-12 PROCEDURE — 71260 CT THORAX DX C+: CPT

## 2021-04-12 PROCEDURE — 70553 MRI BRAIN STEM W/O & W/DYE: CPT

## 2021-04-12 RX ADMIN — IOHEXOL 75 ML: 350 INJECTION, SOLUTION INTRAVENOUS at 09:02

## 2021-04-12 RX ADMIN — GADOTERIDOL 14 ML: 279.3 INJECTION, SOLUTION INTRAVENOUS at 08:36

## 2021-04-12 ASSESSMENT — PAIN SCALES - GENERAL: PAINLEVEL: NO PAIN

## 2021-04-12 NOTE — PROGRESS NOTES
RADIATION ONCOLOGY FOLLOW-UP    DATE OF SERVICE: 4/12/2021    IDENTIFICATION:   A 76 y.o. female with limited stage SCLC of right hilum s/p chemoRT 45Gy in 30 fractions BID completed 7/24/20.    Small cell lung cancer (HCC)  Staging form: Lung, AJCC 8th Edition  - Clinical stage from 5/18/2020: Stage IIB (cT1c, cN1, cM0) - Signed by William Cruz M.D. on 5/18/2020  Type of lung cancer: Small cell lung cancer      RADIATION SUMMARY:  [facility] Treatment Information        Some values may be hidden. Unless noted otherwise, only the newest values recorded on each date are displayed.         [facility] Treatment Summary 7/27/20   Course First Treatment Date 07/06/2020   Course Last Treatment Date 07/24/2020   R_Lung_Nodes Plan from Course C2_SCLC   Fraction 30 of 30   Elapsed Course Days 18 @ [id number]   Prescribed Fraction Dose 150 cGy   Prescribed Total Dose 4,500 cGy   R_Lung_Nodes Reference Point from Course C2_SCLC   Elapsed Course Days 18 @ [id number]   Session Dose -   Total Dose 4,500 cGy   R_Lung_Nodes CP Reference Point from Course C2_SCLC   Elapsed Course Days 18 @ [id number]   Session Dose -   Total Dose 4,636 cGy             HISTORY OF PRESENT ILLNESS:   Patient originally presented with abnormal CT scan finding in July 18 2018 from her breast cancer surveillance.  It was grossly stable until December 11, 2019 which showed slight increase in size of right hilar bunny tissue now measuring 2.4 x 1.5 cm with no evidence of primary small cell lung cancer.  Patient had her screening mammogram December 31, 2019 which showed no evidence of latency.  Patient was seen by Dr. Crain in pulmonary who ordered a PET CT scan in April 6, 2020 which showed FDG avid right hilar lymph node concerning for metastatic disease.  Patient underwent NIKOLAS guided biopsy by Dr. Foster with 11 R and 10 R sampled showing small cell lung cancer TTF-1 positive synaptophysin positive and chromogranin positive with KATHERIN-3 negative.  MRI brain  was performed today May 18, 2020 and per my read shows no evidence of brain metastasis.  She is currently asymptomatic without any pain and is not on oxygen she denies any shortness of breath but does have a clear cough with no hemoptysis.     11/6/20  Called patient to order restaging MR brain and CT chest with contrast. Completed chemoradiation for limited stage small cell lung cancer, but declined further chemo or PCI to brain.     1/8/21  Patient doing well with increased energy and appetite. CT chest and MRI brain shows shrinkage of right hilar mass small 3 mm left upper lobe nodule nonspecific and no evidence of brain metastasis.  She denies any cough, shortness of breath or hemoptysis or weight loss.    INTERVAL HISTORY:  Patient is doing well.  She has no evidence of CNS relapse in her CT chest today shows 2.5 mm right upper lobe nodule unchanged and right hilar lymph node measuring 5.6 x 8.8 mm previously 10 x 15 mm.  No other evidence of disease.  She is doing well overall she did have some nausea with the contrast administration.  She denies any weight loss or hemoptysis.      PROBLEM LIST:  Patient Active Problem List   Diagnosis   • Pulmonary nodule/R hilar mass   • Breast cancer    • Small cell lung cancer (HCC)   • COPD with asthma (HCC)       CURRENT MEDICATIONS:  Current Outpatient Medications   Medication Sig Dispense Refill   • cyclobenzaprine (FLEXERIL) 5 mg tablet Take 1 Tab by mouth 3 times a day as needed. 30 Tab 0   • magnesium oxide (MAG-OX) 400 MG Tab tablet Take 400 mg by mouth.     • zinc sulfate (ZINCATE) 220 (50 Zn) MG Cap Take 220 mg by mouth every day.     • lidocaine-prilocaine (EMLA) 2.5-2.5 % Cream APPLY A SMALL AMOUNT OF CREAM TO PORT SITE AN HOUR PRIOR TO TREATMENT     • Cholecalciferol (VITAMIN D) 50 MCG (2000 UT) Cap Take 4,000 Units by mouth every day.     • Pseudoeph-Doxylamine-DM-APAP (NYQUIL PO) Take  by mouth as needed. Takes prn for sleep approx twice a month     •  atorvastatin (LIPITOR) 10 MG Tab Take 10 mg by mouth every day. Takes in the afternoon     • denosumab (PROLIA) 60 MG/ML Solution Prefilled Syringe Inject 60 mg as instructed Once. Twice a year     • anastrozole (ARIMIDEX) 1 MG TABS Take 1 mg by mouth every day. Takes in the afternoon       No current facility-administered medications for this encounter.       ALLERGIES:  Flu virus vaccine    REVIEW OF SYSTEMS:  A review of systems for today's date of service was reviewed and uploaded into the electronic medical record.    PHYSICAL EXAM:  PERFORMANCE STATUS:  ECOG Performance Review 4/12/2021   ECOG Performance Status Restricted in physically strenuous activity but ambulatory and able to carry out work of a light or sedentary nature, e.g., light house work, office work   Some recent data might be hidden     Karnofsky Score 4/12/2021 1/8/2021   Karnofsky Score 80 70   Some recent data might be hidden     /85 (BP Location: Left arm, Patient Position: Sitting, BP Cuff Size: Adult)   Pulse 96   Temp 36.4 °C (97.6 °F)   Wt 69.8 kg (153 lb 14.1 oz)   SpO2 96%   BMI 27.26 kg/m²   Physical Exam  Vitals reviewed.   HENT:      Head: Normocephalic and atraumatic.      Mouth/Throat:      Mouth: Mucous membranes are moist.   Eyes:      Pupils: Pupils are equal, round, and reactive to light.   Cardiovascular:      Rate and Rhythm: Normal rate.   Pulmonary:      Effort: Pulmonary effort is normal.   Abdominal:      General: Abdomen is flat.   Musculoskeletal:         General: Normal range of motion.   Neurological:      General: No focal deficit present.   Psychiatric:         Mood and Affect: Mood normal.         LABORATORY DATA:   Lab Results   Component Value Date/Time    WBC 7.2 07/27/2017 1235    WBC 9.1 03/16/2012 2330    WBC 7.4 03/15/2012 0620    HEMOGLOBIN 15.7 07/27/2017 1235    HEMOGLOBIN 13.8 03/16/2012 2330    HEMOGLOBIN 14.5 03/15/2012 0620    HEMATOCRIT 47.0 07/27/2017 1235    HEMATOCRIT 42.1 03/16/2012  2330    HEMATOCRIT 43.4 03/15/2012 0620    MCV 95.1 07/27/2017 1235    MCV 93.7 03/16/2012 2330    MCV 92.7 03/15/2012 0620    PLATELETCT 199 07/27/2017 1235    PLATELETCT 278 03/16/2012 2330    PLATELETCT 239 03/15/2012 0620    NEUTS 5.09 07/27/2017 1235      Lab Results   Component Value Date/Time    SODIUM 138 05/16/2020 0857    SODIUM 143 07/27/2017 1235    SODIUM 136 03/15/2012 0620    POTASSIUM 3.8 05/16/2020 0857    POTASSIUM 4.7 07/27/2017 1235    POTASSIUM 3.6 03/15/2012 0620    BUN 13 05/16/2020 0857    BUN 12 07/27/2017 1235    BUN 15 05/11/2016 1308    CREATININE 0.75 05/16/2020 0857    CREATININE 0.84 07/27/2017 1235    CREATININE 0.78 05/11/2016 1308    CALCIUM 8.9 05/16/2020 0857    CALCIUM 9.5 07/27/2017 1235    CALCIUM 8.8 03/15/2012 0620    ALBUMIN 4.4 07/27/2017 1235    ASTSGOT 20 07/27/2017 1235    ALKPHOSPHAT 54 07/27/2017 1235    IFNOTAFR >60 05/16/2020 0857    IFNOTAFR >60 07/27/2017 1235    IFNOTAFR >60 05/11/2016 1308       RADIOLOGY DATA:  MR-BRAIN-WITH & W/O    Result Date: 4/12/2021 4/12/2021 8:15 AM HISTORY/REASON FOR EXAM:  Metastatic lung cancer. TECHNIQUE/EXAM DESCRIPTION:   MRI of the brain without and with contrast; planning for Stereotactic Radiotherapy (SRT). T1 pre-contrast sagittal or T1 weighted 3D TFE pre-contrast sagittal, T2 weighted thin section axial, and T1 weighted 3-D thin section TFE or SPGR post-contrast axial with coronal and optional sagittal reconstructions were obtained of the whole brain. The study was performed on a Adryan 3.0 Verna MRI scanner. 14 mL ProHance contrast was administered intravenously. COMPARISON:  01/06/2021 FINDINGS: Focal encephalomalacia is redemonstrated in the LEFT thalamus. There is diffuse prominence of the CSF containing spaces. There are confluent areas of T2 prolongation in the deep and periventricular white matter which are nonspecific but which most likely reflect areas of chronic microangiopathic ischemic change, though this can  also be seen with gliosis and demyelinating processes. Vascular flow voids in the large intracranial arteries are intact.  Vascular flow voids in the dural venous sinuses are intact. The ventricular system and basal cisterns are within normal limits. There are no mass effects or shift of midline structures. There are no extra-axial fluid collections. There are no (other) areas of abnormal parenchymal or meningeal enhancement. The calvariae are unremarkable. The visualized paranasal sinuses are unremarkable. The mastoid air cells are unremarkable. The orbits are unremarkable.     1.  No evidence of intracranial metastatic disease 2.  Remote LEFT thalamic lacunar infarction is redemonstrated 3.  Moderate atrophy 4.  Advanced white matter changes      IMPRESSION:    A 76 y.o. with limited stage SCLC of right hilum s/p chemoRT 45Gy in 30 fractions BID completed 7/24/20.  Small cell lung cancer (HCC)  Staging form: Lung, AJCC 8th Edition  - Clinical stage from 5/18/2020: Stage IIB (cT1c, cN1, cM0) - Signed by William Cruz M.D. on 5/18/2020  Type of lung cancer: Small cell lung cancer      CANCER STATUS:  No Evidence of Disease    RECOMMENDATIONS:   Patient is doing well with no evidence of disease recurrence in brain or lungs.  She will see Dr. Alcantar in the near future and I will see her back in 6 months with new MRI brain as she elected not to have prophylactic cranial radiation.    Thank you for the opportunity to participate in her care.  If any questions or comments, please do not hesitate in calling.

## 2021-04-12 NOTE — NON-PROVIDER
Patient was seen today in clinic with Dr. Cruz for follow up.  Vitals signs and weight were obtained and pain assessment was completed.  Allergies and medications were reviewed with the patient.  Toxicities of treatment assessed.      Vitals/Pain:  Vitals:    04/12/21 1303   BP: 150/85   BP Location: Left arm   Patient Position: Sitting   BP Cuff Size: Adult   Pulse: 96   Temp: 36.4 °C (97.6 °F)   SpO2: 96%   Weight: 69.8 kg (153 lb 14.1 oz)   Pain Score: No pain        Allergies:   Flu virus vaccine    Current Medications:  Current Outpatient Medications   Medication Sig Dispense Refill   • cyclobenzaprine (FLEXERIL) 5 mg tablet Take 1 Tab by mouth 3 times a day as needed. 30 Tab 0   • magnesium oxide (MAG-OX) 400 MG Tab tablet Take 400 mg by mouth.     • zinc sulfate (ZINCATE) 220 (50 Zn) MG Cap Take 220 mg by mouth every day.     • lidocaine-prilocaine (EMLA) 2.5-2.5 % Cream APPLY A SMALL AMOUNT OF CREAM TO PORT SITE AN HOUR PRIOR TO TREATMENT     • Cholecalciferol (VITAMIN D) 50 MCG (2000 UT) Cap Take 4,000 Units by mouth every day.     • Pseudoeph-Doxylamine-DM-APAP (NYQUIL PO) Take  by mouth as needed. Takes prn for sleep approx twice a month     • atorvastatin (LIPITOR) 10 MG Tab Take 10 mg by mouth every day. Takes in the afternoon     • denosumab (PROLIA) 60 MG/ML Solution Prefilled Syringe Inject 60 mg as instructed Once. Twice a year     • anastrozole (ARIMIDEX) 1 MG TABS Take 1 mg by mouth every day. Takes in the afternoon       No current facility-administered medications for this encounter.           Edwina Martinez, Med Ass't

## 2021-06-02 ENCOUNTER — HOSPITAL ENCOUNTER (OUTPATIENT)
Dept: RADIOLOGY | Facility: MEDICAL CENTER | Age: 76
End: 2021-06-02
Attending: INTERNAL MEDICINE
Payer: COMMERCIAL

## 2021-06-02 DIAGNOSIS — Z90.10 POSTMASTECTOMY LYMPHANGIOSARCOMA SYNDROME, UNSPECIFIED LATERALITY (HCC): ICD-10-CM

## 2021-06-02 DIAGNOSIS — C34.11 MALIGNANT NEOPLASM OF UPPER LOBE OF RIGHT LUNG (HCC): ICD-10-CM

## 2021-06-02 DIAGNOSIS — C50.919 POSTMASTECTOMY LYMPHANGIOSARCOMA SYNDROME, UNSPECIFIED LATERALITY (HCC): ICD-10-CM

## 2021-06-02 DIAGNOSIS — M85.89 DISAPPEARING BONE DISEASE: ICD-10-CM

## 2021-06-02 PROCEDURE — 77080 DXA BONE DENSITY AXIAL: CPT

## 2021-06-06 ENCOUNTER — APPOINTMENT (OUTPATIENT)
Dept: RADIOLOGY | Facility: MEDICAL CENTER | Age: 76
End: 2021-06-06
Attending: EMERGENCY MEDICINE
Payer: COMMERCIAL

## 2021-06-06 ENCOUNTER — HOSPITAL ENCOUNTER (EMERGENCY)
Facility: MEDICAL CENTER | Age: 76
End: 2021-06-06
Attending: EMERGENCY MEDICINE
Payer: COMMERCIAL

## 2021-06-06 VITALS
RESPIRATION RATE: 20 BRPM | HEART RATE: 80 BPM | TEMPERATURE: 97.7 F | DIASTOLIC BLOOD PRESSURE: 59 MMHG | OXYGEN SATURATION: 94 % | BODY MASS INDEX: 27.82 KG/M2 | HEIGHT: 63 IN | SYSTOLIC BLOOD PRESSURE: 97 MMHG | WEIGHT: 157 LBS

## 2021-06-06 DIAGNOSIS — M79.605 BILATERAL LEG PAIN: ICD-10-CM

## 2021-06-06 DIAGNOSIS — R55 SYNCOPE, UNSPECIFIED SYNCOPE TYPE: ICD-10-CM

## 2021-06-06 DIAGNOSIS — M79.604 BILATERAL LEG PAIN: ICD-10-CM

## 2021-06-06 LAB
ALBUMIN SERPL BCP-MCNC: 3.6 G/DL (ref 3.2–4.9)
ALBUMIN/GLOB SERPL: 1.4 G/DL
ALP SERPL-CCNC: 68 U/L (ref 30–99)
ALT SERPL-CCNC: 11 U/L (ref 2–50)
ANION GAP SERPL CALC-SCNC: 14 MMOL/L (ref 7–16)
AST SERPL-CCNC: 18 U/L (ref 12–45)
BASOPHILS # BLD AUTO: 0.2 % (ref 0–1.8)
BASOPHILS # BLD: 0.01 K/UL (ref 0–0.12)
BILIRUB SERPL-MCNC: 0.3 MG/DL (ref 0.1–1.5)
BUN SERPL-MCNC: 9 MG/DL (ref 8–22)
CALCIUM SERPL-MCNC: 7.9 MG/DL (ref 8.5–10.5)
CHLORIDE SERPL-SCNC: 104 MMOL/L (ref 96–112)
CO2 SERPL-SCNC: 19 MMOL/L (ref 20–33)
CREAT SERPL-MCNC: 0.62 MG/DL (ref 0.5–1.4)
EKG IMPRESSION: NORMAL
EOSINOPHIL # BLD AUTO: 0.01 K/UL (ref 0–0.51)
EOSINOPHIL NFR BLD: 0.2 % (ref 0–6.9)
ERYTHROCYTE [DISTWIDTH] IN BLOOD BY AUTOMATED COUNT: 50.7 FL (ref 35.9–50)
GLOBULIN SER CALC-MCNC: 2.6 G/DL (ref 1.9–3.5)
GLUCOSE SERPL-MCNC: 109 MG/DL (ref 65–99)
HCT VFR BLD AUTO: 40.7 % (ref 37–47)
HGB BLD-MCNC: 13.1 G/DL (ref 12–16)
IMM GRANULOCYTES # BLD AUTO: 0.02 K/UL (ref 0–0.11)
IMM GRANULOCYTES NFR BLD AUTO: 0.4 % (ref 0–0.9)
LYMPHOCYTES # BLD AUTO: 0.84 K/UL (ref 1–4.8)
LYMPHOCYTES NFR BLD: 18.6 % (ref 22–41)
MCH RBC QN AUTO: 31.3 PG (ref 27–33)
MCHC RBC AUTO-ENTMCNC: 32.2 G/DL (ref 33.6–35)
MCV RBC AUTO: 97.4 FL (ref 81.4–97.8)
MONOCYTES # BLD AUTO: 0.3 K/UL (ref 0–0.85)
MONOCYTES NFR BLD AUTO: 6.7 % (ref 0–13.4)
NEUTROPHILS # BLD AUTO: 3.33 K/UL (ref 2–7.15)
NEUTROPHILS NFR BLD: 73.9 % (ref 44–72)
NRBC # BLD AUTO: 0 K/UL
NRBC BLD-RTO: 0 /100 WBC
PLATELET # BLD AUTO: 180 K/UL (ref 164–446)
PMV BLD AUTO: 9.7 FL (ref 9–12.9)
POTASSIUM SERPL-SCNC: 3.6 MMOL/L (ref 3.6–5.5)
PROT SERPL-MCNC: 6.2 G/DL (ref 6–8.2)
RBC # BLD AUTO: 4.18 M/UL (ref 4.2–5.4)
SODIUM SERPL-SCNC: 137 MMOL/L (ref 135–145)
TROPONIN T SERPL-MCNC: 10 NG/L (ref 6–19)
WBC # BLD AUTO: 4.5 K/UL (ref 4.8–10.8)

## 2021-06-06 PROCEDURE — 71045 X-RAY EXAM CHEST 1 VIEW: CPT

## 2021-06-06 PROCEDURE — 93005 ELECTROCARDIOGRAM TRACING: CPT

## 2021-06-06 PROCEDURE — 99285 EMERGENCY DEPT VISIT HI MDM: CPT

## 2021-06-06 PROCEDURE — 80053 COMPREHEN METABOLIC PANEL: CPT

## 2021-06-06 PROCEDURE — 85025 COMPLETE CBC W/AUTO DIFF WBC: CPT

## 2021-06-06 PROCEDURE — 84484 ASSAY OF TROPONIN QUANT: CPT

## 2021-06-06 PROCEDURE — 93005 ELECTROCARDIOGRAM TRACING: CPT | Performed by: EMERGENCY MEDICINE

## 2021-06-06 PROCEDURE — 70450 CT HEAD/BRAIN W/O DYE: CPT

## 2021-06-06 RX ORDER — GABAPENTIN 100 MG/1
100 CAPSULE ORAL 3 TIMES DAILY
Qty: 90 CAPSULE | Refills: 0 | Status: SHIPPED | OUTPATIENT
Start: 2021-06-06 | End: 2021-06-13

## 2021-06-06 ASSESSMENT — LIFESTYLE VARIABLES
DOES PATIENT WANT TO STOP DRINKING: CANNOT ASSESS
DO YOU DRINK ALCOHOL: YES

## 2021-06-06 NOTE — ED TRIAGE NOTES
.  Chief Complaint   Patient presents with   • Syncope   • Alcohol Intoxication   • Weakness     Pt biba. Pt found down by runner on the sidewalk. Unknown loc, denies head pain or hitting her head. Disoriented to event. Report chronic weakness and fatigue. o2 88% ra placed on 2l 96%. fsbs 129. Admits to alcohol use this morning.   Mask applied to patient prior to triage. This RN in ppe prior to encounter. Pt denies recent travel or contact with anyone tested positive for covid 19.

## 2021-06-07 NOTE — ED PROVIDER NOTES
"ED Provider Note    CHIEF COMPLAINT  Chief Complaint   Patient presents with   • Syncope   • Alcohol Intoxication   • Weakness       HPI  Emily Barnes is a 76 y.o. female who presents after being found unconscious on the street.  She admits drinking alcohol this morning, states she has been sad regarding her daughter moving to [place] with her grandkids.  Patient states \"of course I drink, I am [place]\".  Patient denies suicidal ideation.  No chest pain, no abdominal pain.  Patient states she does not remember what happened, does not remember being dizzy or why she was on the ground.  No witnessed seizure.  She denies chest pain or shortness of breath, no abdominal pain or vomiting.  In general she states she has the feeling of being weak over the last several days but denies that feeling at this time.    Second complaint she told me later in the encounter was that she has had leg pain since receiving chemotherapy a year ago.  She states the discomfort in her lower extremities is burning and causes difficulty with walking.  She states the pain is from waist down on both sides.    REVIEW OF SYSTEMS    Constitutional: Reported weakness, no fever  Respiratory: Patient is a smoker.  She denies acute shortness of breath or cough  Cardiac: No chest pain.  Possible syncope  Gastrointestinal: No abdominal pain, no vomiting  Musculoskeletal: No neck or back pain.  Neurologic: Denies numbness.  Possible neuropathy in the legs       All other systems are negative.       PAST MEDICAL HISTORY  Past Medical History:   Diagnosis Date   • Anemia 1962   • Breast cancer (HCC)    • Cancer (HCC) 2011    right breast   • Cancer (HCC) 2020    lung    • COPD (chronic obstructive pulmonary disease) (HCC)    • Cough    • Dental disorder     upper and lower dentures   • EMPHYSEMA    • High cholesterol    • Pain     occasional breast pain   • Pneumonia 1948   • Renal disorder 1997    hx of stones   • Renal disorder     stones and " cyst; 4/29/20 pt reports unaware of renal cyst    • S/P radiation therapy     for breast cancer 2011 [facility]   • Small cell lung cancer (HCC)    • Snoring    • Sputum production    • Urinary incontinence        FAMILY HISTORY  Family History   Problem Relation Age of Onset   • Stroke Other    • Heart Disease Other    • Cancer Mother         lung cancer   • Heart Disease Father    • Cancer Sister         lung cancer   • Cancer Sister         lung cancer   • Cancer Sister         unknown cancer       SOCIAL HISTORY  Social History     Socioeconomic History   • Marital status:      Spouse name: Not on file   • Number of children: Not on file   • Years of education: Not on file   • Highest education level: Not on file   Occupational History   • Not on file   Tobacco Use   • Smoking status: Current Every Day Smoker     Packs/day: 1.00     Years: 58.00     Pack years: 58.00     Types: Cigarettes     Start date: 1962   • Smokeless tobacco: Never Used   Vaping Use   • Vaping Use: Never used   Substance and Sexual Activity   • Alcohol use: Yes     Comment: 4 per week   • Drug use: No   • Sexual activity: Not on file   Other Topics Concern   • Not on file   Social History Narrative    ** Merged History Encounter **          Social Determinants of Health     Financial Resource Strain:    • Difficulty of Paying Living Expenses:    Food Insecurity:    • Worried About Running Out of Food in the Last Year:    • Ran Out of Food in the Last Year:    Transportation Needs:    • Lack of Transportation (Medical):    • Lack of Transportation (Non-Medical):    Physical Activity:    • Days of Exercise per Week:    • Minutes of Exercise per Session:    Stress:    • Feeling of Stress :    Social Connections:    • Frequency of Communication with Friends and Family:    • Frequency of Social Gatherings with Friends and Family:    • Attends Jewish Services:    • Active Member of Clubs or Organizations:    • Attends Club or  Organization Meetings:    • Marital Status:    Intimate Partner Violence:    • Fear of Current or Ex-Partner:    • Emotionally Abused:    • Physically Abused:    • Sexually Abused:        SURGICAL HISTORY  Past Surgical History:   Procedure Laterality Date   • PB BRONCHOSCOPY,DIAGNOSTIC  5/7/2020    Procedure: BRONCHOSCOPY-FIBER OPTIC WITH POSSIBLE WASH, BRUSH, BROCHOALVEOLAR LAVAGE, BIOPSY FINE NEEDLE ASPIRATION;  Surgeon: Peter Foster M.D.;  Location: SURGERY [facility];  Service: Pulmonary   • ENDOBRONCHIAL US ADD-ON  5/7/2020    Procedure: ENDOBRONCHIAL ULTRASOUND (EBUS);  Surgeon: Peter Foster M.D.;  Location: SURGERY [facility];  Service: Pulmonary   • COLONOSCOPY WITH BIOPSY  3/15/2012    Performed by RC FANG at SURGERY [facility]   • BREAST BIOPSY  8/23/2011    Performed by MOLLY PARRA at SURGERY SAME DAY [facility] ORS   • NODE BIOPSY SENTINEL  8/8/2011    Performed by MOLLY PARRA at SURGERY SAME DAY [facility] ORS   • LUMPECTOMY  8/8/2011    Performed by MOLLY PARRA at SURGERY SAME DAY [facility] ORS   • TONSILLECTOMY  1972   • PB RADIATION THERAPY PLAN SIMPLE         CURRENT MEDICATIONS  Home Medications     Reviewed by Ade Brannon R.N. (Registered Nurse) on 06/06/21 at 1647  Med List Status: Partial   Medication Last Dose Status   anastrozole (ARIMIDEX) 1 MG TABS 6/5/2021 Active   atorvastatin (LIPITOR) 10 MG Tab 6/5/2021 Active   Cholecalciferol (VITAMIN D) 50 MCG (2000 UT) Cap  Active   cyclobenzaprine (FLEXERIL) 5 mg tablet 6/5/2021 Active   denosumab (PROLIA) 60 MG/ML Solution Prefilled Syringe  Active   lidocaine-prilocaine (EMLA) 2.5-2.5 % Cream  Active   magnesium oxide (MAG-OX) 400 MG Tab tablet  Active   Pseudoeph-Doxylamine-DM-APAP (NYQUIL PO)  Active   zinc sulfate (ZINCATE) 220 (50 Zn) MG Cap  Active                ALLERGIES  Allergies   Allergen Reactions   • Flu Virus Vaccine Hives       PHYSICAL EXAM  VITAL SIGNS: /55   Pulse 82    "Temp 36.5 °C (97.7 °F) (Temporal)   Resp 18   Ht 1.6 m (5' 3\")   Wt 71.2 kg (157 lb)   SpO2 97%   BMI 27.81 kg/m²   Constitutional:  Non-toxic appearance.   HENT: No facial swelling, no facial trauma, no epistaxis  Eyes: Anicteric, no conjunctivitis.     Cardiovascular: Normal heart rate, Normal rhythm  Pulmonary:  No wheezing, No rales.  Diminished breath sounds both lung bases  Gastrointestinal: Soft, No tenderness, No masses  Skin: Warm, Dry, No cyanosis.  No bruising, no laceration, no abrasion  Neurologic: Speech is clear, follows commands, facial expression is symmetrical.  Psychiatric: Affect normal,  Mood normal.  Patient is calm and cooperative.  Patient was briefly tearful while describing her daughter moving to [place], otherwise as above  Musculoskeletal: Diffuse bilateral leg pain, no deformity, no bruising or other evidence of trauma.    EKG/Labs  Results for orders placed or performed during the hospital encounter of 06/06/21   CBC WITH DIFFERENTIAL   Result Value Ref Range    WBC 4.5 (L) 4.8 - 10.8 K/uL    RBC 4.18 (L) 4.20 - 5.40 M/uL    Hemoglobin 13.1 12.0 - 16.0 g/dL    Hematocrit 40.7 37.0 - 47.0 %    MCV 97.4 81.4 - 97.8 fL    MCH 31.3 27.0 - 33.0 pg    MCHC 32.2 (L) 33.6 - 35.0 g/dL    RDW 50.7 (H) 35.9 - 50.0 fL    Platelet Count 180 164 - 446 K/uL    MPV 9.7 9.0 - 12.9 fL    Neutrophils-Polys 73.90 (H) 44.00 - 72.00 %    Lymphocytes 18.60 (L) 22.00 - 41.00 %    Monocytes 6.70 0.00 - 13.40 %    Eosinophils 0.20 0.00 - 6.90 %    Basophils 0.20 0.00 - 1.80 %    Immature Granulocytes 0.40 0.00 - 0.90 %    Nucleated RBC 0.00 /100 WBC    Neutrophils (Absolute) 3.33 2.00 - 7.15 K/uL    Lymphs (Absolute) 0.84 (L) 1.00 - 4.80 K/uL    Monos (Absolute) 0.30 0.00 - 0.85 K/uL    Eos (Absolute) 0.01 0.00 - 0.51 K/uL    Baso (Absolute) 0.01 0.00 - 0.12 K/uL    Immature Granulocytes (abs) 0.02 0.00 - 0.11 K/uL    NRBC (Absolute) 0.00 K/uL   COMP METABOLIC PANEL   Result Value Ref Range    Sodium " 137 135 - 145 mmol/L    Potassium 3.6 3.6 - 5.5 mmol/L    Chloride 104 96 - 112 mmol/L    Co2 19 (L) 20 - 33 mmol/L    Anion Gap 14.0 7.0 - 16.0    Glucose 109 (H) 65 - 99 mg/dL    Bun 9 8 - 22 mg/dL    Creatinine 0.62 0.50 - 1.40 mg/dL    Calcium 7.9 (L) 8.5 - 10.5 mg/dL    AST(SGOT) 18 12 - 45 U/L    ALT(SGPT) 11 2 - 50 U/L    Alkaline Phosphatase 68 30 - 99 U/L    Total Bilirubin 0.3 0.1 - 1.5 mg/dL    Albumin 3.6 3.2 - 4.9 g/dL    Total Protein 6.2 6.0 - 8.2 g/dL    Globulin 2.6 1.9 - 3.5 g/dL    A-G Ratio 1.4 g/dL   TROPONIN   Result Value Ref Range    Troponin T 10 6 - 19 ng/L   ESTIMATED GFR   Result Value Ref Range    GFR If African American >60 >60 mL/min/1.73 m 2    GFR If Non African American >60 >60 mL/min/1.73 m 2   EKG   Result Value Ref Range    Report       [facility] Emergency [facility]    Test Date:  2021  Pt Name:    SCOTT VEGA            Department: ER  MRN:        [id number]                      Room:       [facility]  Gender:     Female                       Technician: [id number]  :        [id number]                   Requested By:ER TRIAGE PROTOCOL  Order #:    [id number]                    Reading MD: KELLY BUTCHER MD    Measurements  Intervals                                Axis  Rate:       84                           P:          64  VA:         188                          QRS:        76  QRSD:       98                           T:          29  QT:         416  QTc:        492    Interpretive Statements  SINUS RHYTHM  PROBABLE INFERIOR INFARCT, OLD  CONSIDER ANTEROLATERAL INFARCT  BORDERLINE PROLONGED QT INTERVAL  Compared to ECG 2020 11:27:13  No significant changes  Electronically Signed On 2021 20:33:59 PDT by KELLY BUTCHER MD           RADIOLOGY/PROCEDURES  DX-CHEST-PORTABLE (1 VIEW)   Final Result      1.  There are changes consistent with mild interstitial edema.      CT-HEAD W/O   Final Result      1.  Cerebral atrophy.      2.  White matter  lucencies most consistent with small vessel ischemic change versus demyelination or gliosis.      3. There is no acute intracranial hemorrhage or calvarial fracture.            COURSE & MEDICAL DECISION MAKING  Pertinent Labs & Imaging studies reviewed. (See chart for details)  Patient has had syncopal event, of unknown etiology.  I see no secondary signs of seizure, no tongue bite, no urinary incontinence, no trauma.  CT scan of the head is negative.  Her EKG negative, normal troponin.  No anemia, no renal failure, no severe electrolyte imbalance.  I recommended hospitalization to the patient for cardiac monitoring and ongoing evaluation.  Patient is refusing.     This patient is alert and oriented, and has demonstrated the capacity to understand the risks of leaving against my medical advice.  These risks have been explained as death or permanent disability.  The patient is aware they can return for any concerns or if they changes their mind about further testing and admission to the hospital.  The patient is aware they should follow up with their doctor as soon as possible.  No medical condition or medication appears to be clouding this patient's judgement at this time.    Patient requesting medication for her legs.  As this discomfort is burning in nature, onset after chemotherapy, it is possible related to neuropathy.  She is given a trial course of gabapentin for 1 week, is advised to follow-up with her pain specialist, primary doctor or oncologist for reevaluation should she need more prescriptions and further work-up regarding this.  As the symptoms of leg pain are chronic, no imaging was obtained in the ER regarding this complaint.    FINAL IMPRESSION     1. Syncope, unspecified syncope type     2. Bilateral leg pain      Chronic, states since chemotherapy months ago                   Electronically signed by: Robert Henriquez M.D., 6/6/2021 7:30 PM

## 2021-06-07 NOTE — ED NOTES
Assist RN: re-evaluation done. Patient discharged with prescription and instruction. Verbalized understanding.

## 2021-10-12 ENCOUNTER — HOSPITAL ENCOUNTER (OUTPATIENT)
Dept: RADIOLOGY | Facility: MEDICAL CENTER | Age: 76
End: 2021-10-12
Attending: RADIOLOGY
Payer: COMMERCIAL

## 2021-10-12 DIAGNOSIS — C34.90 SMALL CELL LUNG CANCER (HCC): ICD-10-CM

## 2021-10-12 PROCEDURE — 700117 HCHG RX CONTRAST REV CODE 255: Performed by: RADIOLOGY

## 2021-10-12 PROCEDURE — A9576 INJ PROHANCE MULTIPACK: HCPCS | Performed by: RADIOLOGY

## 2021-10-12 PROCEDURE — 70553 MRI BRAIN STEM W/O & W/DYE: CPT

## 2021-10-12 RX ADMIN — GADOTERIDOL 15 ML: 279.3 INJECTION, SOLUTION INTRAVENOUS at 15:14

## 2021-10-14 ENCOUNTER — TELEPHONE (OUTPATIENT)
Dept: RADIATION ONCOLOGY | Facility: MEDICAL CENTER | Age: 76
End: 2021-10-14

## 2021-10-14 ENCOUNTER — HOSPITAL ENCOUNTER (OUTPATIENT)
Dept: RADIATION ONCOLOGY | Facility: MEDICAL CENTER | Age: 76
End: 2021-10-31
Attending: RADIOLOGY
Payer: COMMERCIAL

## 2021-10-14 VITALS
SYSTOLIC BLOOD PRESSURE: 143 MMHG | WEIGHT: 153 LBS | HEART RATE: 92 BPM | TEMPERATURE: 97 F | DIASTOLIC BLOOD PRESSURE: 79 MMHG | OXYGEN SATURATION: 98 % | BODY MASS INDEX: 27.1 KG/M2

## 2021-10-14 DIAGNOSIS — C34.11 SMALL CELL LUNG CANCER, RIGHT UPPER LOBE (HCC): ICD-10-CM

## 2021-10-14 DIAGNOSIS — C34.90 SMALL CELL LUNG CANCER (HCC): ICD-10-CM

## 2021-10-14 PROCEDURE — 99212 OFFICE O/P EST SF 10 MIN: CPT | Performed by: RADIOLOGY

## 2021-10-14 PROCEDURE — 99214 OFFICE O/P EST MOD 30 MIN: CPT | Performed by: RADIOLOGY

## 2021-10-14 ASSESSMENT — FIBROSIS 4 INDEX: FIB4 SCORE: 2.26

## 2021-10-14 ASSESSMENT — PAIN SCALES - GENERAL: PAINLEVEL: NO PAIN

## 2021-10-14 NOTE — PROGRESS NOTES
RADIATION ONCOLOGY FOLLOW-UP    DATE OF SERVICE: 10/14/2021    IDENTIFICATION:   A 76 y.o. female with limited stage SCLC s/p chemoRT 45Gy in 30 fx  Visit Diagnoses     ICD-10-CM   1. Small cell lung cancer (HCC)  C34.90     Small cell lung cancer (HCC)  Staging form: Lung, AJCC 8th Edition  - Clinical stage from 5/18/2020: Stage IIB (cT1c, cN1, cM0) - Signed by William Cruz M.D. on 5/18/2020  Type of lung cancer: Small cell lung cancer        HISTORY OF PRESENT ILLNESS:   Patient originally presented with abnormal CT scan finding in July 18 2018 from her breast cancer surveillance.  It was grossly stable until December 11, 2019 which showed slight increase in size of right hilar bunny tissue now measuring 2.4 x 1.5 cm with no evidence of primary small cell lung cancer.  Patient had her screening mammogram December 31, 2019 which showed no evidence of latency.  Patient was seen by Dr. Crain in pulmonary who ordered a PET CT scan in April 6, 2020 which showed FDG avid right hilar lymph node concerning for metastatic disease.  Patient underwent NIKOLAS guided biopsy by Dr. Foster with 11 R and 10 R sampled showing small cell lung cancer TTF-1 positive synaptophysin positive and chromogranin positive with KATHERIN-3 negative.  MRI brain was performed today May 18, 2020 and per my read shows no evidence of brain metastasis.  She is currently asymptomatic without any pain and is not on oxygen she denies any shortness of breath but does have a clear cough with no hemoptysis.     11/6/20  Called patient to order restaging MR brain and CT chest with contrast. Completed chemoradiation for limited stage small cell lung cancer, but declined further chemo or PCI to brain.     1/8/21  Patient doing well with increased energy and appetite. CT chest and MRI brain shows shrinkage of right hilar mass small 3 mm left upper lobe nodule nonspecific and no evidence of brain metastasis.  She denies any cough, shortness of breath or  hemoptysis or weight loss.    4/12/21  Patient is doing well.  She has no evidence of CNS relapse in her CT chest today shows 2.5 mm right upper lobe nodule unchanged and right hilar lymph node measuring 5.6 x 8.8 mm previously 10 x 15 mm.  No other evidence of disease.  She is doing well overall she did have some nausea with the contrast administration.  She denies any weight loss or hemoptysis    Interval History  Patient is doing well had MRI brain October 2021 which showed no evidence of metastasis.  I have ordered a repeat CT chest abdomen pelvis scan today to evaluate the rest of her body.  Patient denies any cough shortness of breath or weight loss.    PROBLEM LIST:  Patient Active Problem List   Diagnosis   • Pulmonary nodule/R hilar mass   • Breast cancer    • Small cell lung cancer (HCC)   • COPD with asthma (HCC)       CURRENT MEDICATIONS:  Current Outpatient Medications   Medication Sig Dispense Refill   • cyclobenzaprine (FLEXERIL) 5 mg tablet Take 1 Tab by mouth 3 times a day as needed. 30 Tab 0   • magnesium oxide (MAG-OX) 400 MG Tab tablet Take 400 mg by mouth.     • zinc sulfate (ZINCATE) 220 (50 Zn) MG Cap Take 220 mg by mouth every day.     • lidocaine-prilocaine (EMLA) 2.5-2.5 % Cream APPLY A SMALL AMOUNT OF CREAM TO PORT SITE AN HOUR PRIOR TO TREATMENT     • Cholecalciferol (VITAMIN D) 50 MCG (2000 UT) Cap Take 4,000 Units by mouth every day.     • Pseudoeph-Doxylamine-DM-APAP (NYQUIL PO) Take  by mouth as needed. Takes prn for sleep approx twice a month     • atorvastatin (LIPITOR) 10 MG Tab Take 10 mg by mouth every day. Takes in the afternoon     • denosumab (PROLIA) 60 MG/ML Solution Prefilled Syringe Inject 60 mg as instructed Once. Twice a year     • anastrozole (ARIMIDEX) 1 MG TABS Take 1 mg by mouth every day. Takes in the afternoon       No current facility-administered medications for this encounter.       ALLERGIES:  Flu virus vaccine    REVIEW OF SYSTEMS:  A review of systems for  today's date of service was reviewed and uploaded into the electronic medical record.    PHYSICAL EXAM:  PERFORMANCE STATUS:  ECOG Performance Review 10/14/2021   ECOG Performance Status Fully active, able to carry on all pre-disease performance without restriction   Some recent data might be hidden     Karnofsky Score 10/14/2021   Karnofsky Score 100   Some recent data might be hidden     /79 (BP Location: Right arm, Patient Position: Sitting, BP Cuff Size: Adult)   Pulse 92   Temp 36.1 °C (97 °F) (Temporal)   Wt 69.4 kg (153 lb)   SpO2 98%   BMI 27.10 kg/m²   Physical Exam  Constitutional:       General: She is not in acute distress.  HENT:      Head: Normocephalic.      Mouth/Throat:      Mouth: Mucous membranes are moist.   Eyes:      Pupils: Pupils are equal, round, and reactive to light.   Cardiovascular:      Rate and Rhythm: Normal rate.   Pulmonary:      Effort: Pulmonary effort is normal.      Breath sounds: Normal air entry.   Abdominal:      General: Abdomen is flat.   Musculoskeletal:         General: Normal range of motion.   Skin:     Findings: No erythema.   Neurological:      General: No focal deficit present.      Mental Status: She is alert.         LABORATORY DATA:   Lab Results   Component Value Date/Time    WBC 4.5 (L) 06/06/2021 1656    WBC 7.2 07/27/2017 1235    WBC 9.1 03/16/2012 2330    HEMOGLOBIN 13.1 06/06/2021 1656    HEMOGLOBIN 15.7 07/27/2017 1235    HEMOGLOBIN 13.8 03/16/2012 2330    HEMATOCRIT 40.7 06/06/2021 1656    HEMATOCRIT 47.0 07/27/2017 1235    HEMATOCRIT 42.1 03/16/2012 2330    MCV 97.4 06/06/2021 1656    MCV 95.1 07/27/2017 1235    MCV 93.7 03/16/2012 2330    PLATELETCT 180 06/06/2021 1656    PLATELETCT 199 07/27/2017 1235    PLATELETCT 278 03/16/2012 2330    NEUTS 3.33 06/06/2021 1656    NEUTS 5.09 07/27/2017 1235      Lab Results   Component Value Date/Time    SODIUM 137 06/06/2021 1656    SODIUM 138 05/16/2020 0857    SODIUM 143 07/27/2017 1235    POTASSIUM 3.6  06/06/2021 1656    POTASSIUM 3.8 05/16/2020 0857    POTASSIUM 4.7 07/27/2017 1235    BUN 9 06/06/2021 1656    BUN 13 05/16/2020 0857    BUN 12 07/27/2017 1235    CREATININE 0.62 06/06/2021 1656    CREATININE 0.75 05/16/2020 0857    CREATININE 0.84 07/27/2017 1235    CALCIUM 7.9 (L) 06/06/2021 1656    CALCIUM 8.9 05/16/2020 0857    CALCIUM 9.5 07/27/2017 1235    ALBUMIN 3.6 06/06/2021 1656    ALBUMIN 4.4 07/27/2017 1235    ASTSGOT 18 06/06/2021 1656    ASTSGOT 20 07/27/2017 1235    ALKPHOSPHAT 68 06/06/2021 1656    ALKPHOSPHAT 54 07/27/2017 1235    IFNOTAFR >60 06/06/2021 1656    IFNOTAFR >60 05/16/2020 0857    IFNOTAFR >60 07/27/2017 1235       RADIOLOGY DATA:  MR-BRAIN-WITH & W/O    Result Date: 10/14/2021  10/12/2021 1:58 PM HISTORY/REASON FOR EXAM: CNS metastatic lesions, monitor; Metastasis - SRS Protocol Complete TECHNIQUE/EXAM DESCRIPTION: T1 sagittal, T2 axial, flair coronal, T1 coronal, and diffusion-weighted axial images were obtained of the brain pre-contrast followed by T1 coronal and axial images post intravenous administration of 15 mL COMPARISON: MRI brain dated 4/12/2021, 1/6/2021 and 10/28/2020. FINDINGS: Gradient-echo images do not demonstrate any evidence of intracranial hemorrhage. Diffusion weighted images do not demonstrate any evidence of restricted diffusion. T2 hyperintensities noted in central pontine white matter, the bilateral periventricular regions, the bilateral cerebral hemispheric deep white matter, largely stable from previous examination. In particular prominence noted, also stable from the previous study. Postcontrast images do not demonstrate abnormal intracranial enhancement. There is no abnormal intracranial enhancement. The Sella is within normal limits. The craniocervical junction is within normal limits. There are no extra axial collections. Visualized intracranial arterial flow voids are within normal limits. Bone marrow signal in the calvarium is within normal limits.  Included portions of the paranasal sinuses are within normal limits. Included portions of the mastoid air cells are within normal limits. Included portions of the orbits are within normal limits     No acute process. No evidence of intracranial metastatic disease. No abnormal intracranial enhancement is seen. Stable appearance of extensive periventricular and deep white matter T2 hyperintensity involving both cerebral hemispheres as well as the central genaro.      IMPRESSION:    A 76 y.o. with   Visit Diagnoses     ICD-10-CM   1. Small cell lung cancer (HCC)  C34.90     Small cell lung cancer (HCC)  Staging form: Lung, AJCC 8th Edition  - Clinical stage from 5/18/2020: Stage IIB (cT1c, cN1, cM0) - Signed by William Cruz M.D. on 5/18/2020  Type of lung cancer: Small cell lung cancer      CANCER STATUS:  No Evidence of Disease    RECOMMENDATIONS:   Patient is doing well with no evidence of disease recurrence.  I have ordered a repeat CT chest abdomen pelvis to evaluate her.  I have ordered a repeat MRI brain for 6 months.  Patient is currently depressed as her grandchildren moved to the [facility].    Thank you for the opportunity to participate in her care.  If any questions or comments, please do not hesitate in calling.    Orders Placed This Encounter   • CT-CHEST,ABDOMEN,PELVIS WITH   • MR-BRAIN-WITH & W/O

## 2021-10-14 NOTE — NON-PROVIDER
Patient was seen today in clinic with Dr. Cruz for follow-up.  Vitals signs and weight were obtained and pain assessment was completed.  Allergies and medications were reviewed with the patient.    Vitals/Pain:  Vitals:    10/14/21 1326   BP: 143/79   BP Location: Right arm   Patient Position: Sitting   BP Cuff Size: Adult   Pulse: 92   Temp: 36.1 °C (97 °F)   TempSrc: Temporal   SpO2: 98%   Weight: 69.4 kg (153 lb)   Pain Score: No pain    Allergies:   Flu virus vaccine    Current Medications:  Current Outpatient Medications   Medication Sig Dispense Refill   • cyclobenzaprine (FLEXERIL) 5 mg tablet Take 1 Tab by mouth 3 times a day as needed. 30 Tab 0   • magnesium oxide (MAG-OX) 400 MG Tab tablet Take 400 mg by mouth.     • zinc sulfate (ZINCATE) 220 (50 Zn) MG Cap Take 220 mg by mouth every day.     • lidocaine-prilocaine (EMLA) 2.5-2.5 % Cream APPLY A SMALL AMOUNT OF CREAM TO PORT SITE AN HOUR PRIOR TO TREATMENT     • Cholecalciferol (VITAMIN D) 50 MCG (2000 UT) Cap Take 4,000 Units by mouth every day.     • Pseudoeph-Doxylamine-DM-APAP (NYQUIL PO) Take  by mouth as needed. Takes prn for sleep approx twice a month     • atorvastatin (LIPITOR) 10 MG Tab Take 10 mg by mouth every day. Takes in the afternoon     • denosumab (PROLIA) 60 MG/ML Solution Prefilled Syringe Inject 60 mg as instructed Once. Twice a year     • anastrozole (ARIMIDEX) 1 MG TABS Take 1 mg by mouth every day. Takes in the afternoon       No current facility-administered medications for this encounter.         PCP:  Babita Reed R.N.

## 2021-10-20 NOTE — ADDENDUM NOTE
Encounter addended by: William Cruz M.D. on: 10/20/2021 8:49 AM   Actions taken: Visit diagnoses modified, Order list changed, Diagnosis association updated

## 2021-10-27 ENCOUNTER — HOSPITAL ENCOUNTER (OUTPATIENT)
Dept: RADIOLOGY | Facility: MEDICAL CENTER | Age: 76
End: 2021-10-27
Attending: RADIOLOGY
Payer: COMMERCIAL

## 2021-10-27 DIAGNOSIS — C34.90 SMALL CELL LUNG CANCER (HCC): ICD-10-CM

## 2021-10-27 PROCEDURE — 700117 HCHG RX CONTRAST REV CODE 255: Performed by: RADIOLOGY

## 2021-10-27 PROCEDURE — 71260 CT THORAX DX C+: CPT

## 2021-10-27 RX ADMIN — IOHEXOL 100 ML: 350 INJECTION, SOLUTION INTRAVENOUS at 11:03

## 2021-10-27 RX ADMIN — IOHEXOL 25 ML: 240 INJECTION, SOLUTION INTRATHECAL; INTRAVASCULAR; INTRAVENOUS; ORAL at 11:04

## 2021-12-06 ENCOUNTER — HOSPITAL ENCOUNTER (OUTPATIENT)
Dept: RADIOLOGY | Facility: MEDICAL CENTER | Age: 76
End: 2021-12-06
Attending: INTERNAL MEDICINE
Payer: COMMERCIAL

## 2021-12-06 DIAGNOSIS — R92.30 BREAST DENSITY: ICD-10-CM

## 2021-12-06 PROCEDURE — 76641 ULTRASOUND BREAST COMPLETE: CPT

## 2022-03-17 ENCOUNTER — HOSPITAL ENCOUNTER (OUTPATIENT)
Dept: RADIOLOGY | Facility: MEDICAL CENTER | Age: 77
End: 2022-03-17
Attending: INTERNAL MEDICINE
Payer: COMMERCIAL

## 2022-03-17 DIAGNOSIS — Z90.10 POSTMASTECTOMY LYMPHANGIOSARCOMA SYNDROME, UNSPECIFIED LATERALITY (HCC): ICD-10-CM

## 2022-03-17 DIAGNOSIS — C34.11 MALIGNANT NEOPLASM OF UPPER LOBE OF RIGHT LUNG (HCC): ICD-10-CM

## 2022-03-17 DIAGNOSIS — C50.919 POSTMASTECTOMY LYMPHANGIOSARCOMA SYNDROME, UNSPECIFIED LATERALITY (HCC): ICD-10-CM

## 2022-03-17 PROCEDURE — A9503 TC99M MEDRONATE: HCPCS

## 2022-04-14 ENCOUNTER — APPOINTMENT (OUTPATIENT)
Dept: RADIOLOGY | Facility: MEDICAL CENTER | Age: 77
End: 2022-04-14
Attending: RADIOLOGY
Payer: COMMERCIAL

## 2022-04-15 ENCOUNTER — HOSPITAL ENCOUNTER (OUTPATIENT)
Dept: RADIOLOGY | Facility: MEDICAL CENTER | Age: 77
End: 2022-04-15
Attending: INTERNAL MEDICINE
Payer: COMMERCIAL

## 2022-04-15 DIAGNOSIS — Z12.31 VISIT FOR SCREENING MAMMOGRAM: ICD-10-CM

## 2022-04-15 DIAGNOSIS — Z90.11 POSTMASTECTOMY LYMPHANGIOSARCOMA OF RIGHT BREAST (HCC): ICD-10-CM

## 2022-04-15 DIAGNOSIS — C34.11 MALIGNANT NEOPLASM OF UPPER LOBE OF RIGHT LUNG (HCC): ICD-10-CM

## 2022-04-15 DIAGNOSIS — C50.911 POSTMASTECTOMY LYMPHANGIOSARCOMA OF RIGHT BREAST (HCC): ICD-10-CM

## 2022-04-15 PROCEDURE — 77063 BREAST TOMOSYNTHESIS BI: CPT

## 2022-05-01 ENCOUNTER — HOSPITAL ENCOUNTER (OUTPATIENT)
Dept: RADIOLOGY | Facility: MEDICAL CENTER | Age: 77
End: 2022-05-01
Attending: RADIOLOGY
Payer: COMMERCIAL

## 2022-05-01 DIAGNOSIS — C34.11 SMALL CELL LUNG CANCER, RIGHT UPPER LOBE (HCC): ICD-10-CM

## 2022-05-01 PROCEDURE — 700117 HCHG RX CONTRAST REV CODE 255: Performed by: RADIOLOGY

## 2022-05-01 PROCEDURE — 70553 MRI BRAIN STEM W/O & W/DYE: CPT

## 2022-05-01 PROCEDURE — A9576 INJ PROHANCE MULTIPACK: HCPCS | Performed by: RADIOLOGY

## 2022-05-01 RX ADMIN — GADOTERIDOL 14 ML: 279.3 INJECTION, SOLUTION INTRAVENOUS at 13:19

## 2022-05-09 ENCOUNTER — HOSPITAL ENCOUNTER (OUTPATIENT)
Dept: RADIATION ONCOLOGY | Facility: MEDICAL CENTER | Age: 77
End: 2022-05-31
Attending: RADIOLOGY
Payer: COMMERCIAL

## 2022-05-09 VITALS
TEMPERATURE: 98.1 F | HEART RATE: 87 BPM | OXYGEN SATURATION: 96 % | SYSTOLIC BLOOD PRESSURE: 179 MMHG | BODY MASS INDEX: 28 KG/M2 | DIASTOLIC BLOOD PRESSURE: 87 MMHG | WEIGHT: 158.07 LBS

## 2022-05-09 DIAGNOSIS — M19.90 ARTHRITIS: ICD-10-CM

## 2022-05-09 DIAGNOSIS — C34.90 SMALL CELL LUNG CANCER (HCC): ICD-10-CM

## 2022-05-09 PROCEDURE — 99212 OFFICE O/P EST SF 10 MIN: CPT | Performed by: RADIOLOGY

## 2022-05-09 PROCEDURE — 99214 OFFICE O/P EST MOD 30 MIN: CPT | Performed by: RADIOLOGY

## 2022-05-09 ASSESSMENT — PAIN SCALES - GENERAL: PAINLEVEL: 5=MODERATE PAIN

## 2022-05-09 ASSESSMENT — FIBROSIS 4 INDEX: FIB4 SCORE: 2.291486218791003532

## 2022-05-09 NOTE — PROGRESS NOTES
RADIATION ONCOLOGY FOLLOW-UP    DATE OF SERVICE: 5/9/2022    IDENTIFICATION:   A 77 y.o. female with LS_SCLC  Small cell lung cancer (HCC)  Staging form: Lung, AJCC 8th Edition  - Clinical stage from 5/18/2020: Stage IIB (cT1c, cN1, cM0) - Signed by William Cruz M.D. on 5/18/2020  Type of lung cancer: Small cell lung cancer      RADIATION SUMMARY:  [facility] Treatment Information        Some values may be hidden. Unless noted otherwise, only the newest values recorded on each date are displayed.         [facility] Treatment Summary 7/27/20   Course First Treatment Date 07/06/2020   Course Last Treatment Date 07/24/2020   R_Lung_Nodes Plan from Course C2_SCLC   R_Lung_Nodes Reference Point from Course C2_SCLC   R_Lung_Nodes CP Reference Point from Course C2_SCLC           HISTORY OF PRESENT ILLNESS:   Patient originally presented with abnormal CT scan finding in July 18 2018 from her breast cancer surveillance.  It was grossly stable until December 11, 2019 which showed slight increase in size of right hilar bunny tissue now measuring 2.4 x 1.5 cm with no evidence of primary small cell lung cancer.  Patient had her screening mammogram December 31, 2019 which showed no evidence of latency.  Patient was seen by Dr. Crain in pulmonary who ordered a PET CT scan in April 6, 2020 which showed FDG avid right hilar lymph node concerning for metastatic disease.  Patient underwent NIKOLAS guided biopsy by Dr. Foster with 11 R and 10 R sampled showing small cell lung cancer TTF-1 positive synaptophysin positive and chromogranin positive with KATHERIN-3 negative.  MRI brain was performed today May 18, 2020 and per my read shows no evidence of brain metastasis.  She is currently asymptomatic without any pain and is not on oxygen she denies any shortness of breath but does have a clear cough with no hemoptysis.     11/6/20  Called patient to order restaging MR brain and CT chest with contrast. Completed chemoradiation for limited stage small  cell lung cancer, but declined further chemo or PCI to brain.     1/8/21  Patient doing well with increased energy and appetite. CT chest and MRI brain shows shrinkage of right hilar mass small 3 mm left upper lobe nodule nonspecific and no evidence of brain metastasis.  She denies any cough, shortness of breath or hemoptysis or weight loss.     4/12/21  Patient is doing well.  She has no evidence of CNS relapse in her CT chest today shows 2.5 mm right upper lobe nodule unchanged and right hilar lymph node measuring 5.6 x 8.8 mm previously 10 x 15 mm.  No other evidence of disease.  She is doing well overall she did have some nausea with the contrast administration.  She denies any weight loss or hemoptysis     10/14/21  Patient is doing well had MRI brain October 2021 which showed no evidence of metastasis.  I have ordered a repeat CT chest abdomen pelvis scan today to evaluate the rest of her body.  Patient denies any cough shortness of breath or weight loss.     Interval History  Patient doing well had MRI brain which showed no evidence of disease recurrence.  She has some joint pain especially in the right shoulder and right knee.  She did have bone scan which showed no evidence of bone metastasis but significant arthritis.    PROBLEM LIST:  Patient Active Problem List   Diagnosis   • Pulmonary nodule/R hilar mass   • Breast cancer    • Small cell lung cancer (HCC)   • COPD with asthma (HCC)       CURRENT MEDICATIONS:  Current Outpatient Medications   Medication Sig Dispense Refill   • atorvastatin (LIPITOR) 10 MG Tab Take 10 mg by mouth every day. Takes in the afternoon     • cyclobenzaprine (FLEXERIL) 5 mg tablet Take 1 Tab by mouth 3 times a day as needed. (Patient not taking: Reported on 5/9/2022) 30 Tab 0   • magnesium oxide (MAG-OX) 400 MG Tab tablet Take 400 mg by mouth. (Patient not taking: Reported on 5/9/2022)     • zinc sulfate (ZINCATE) 220 (50 Zn) MG Cap Take 220 mg by mouth every day. (Patient not  taking: Reported on 5/9/2022)     • lidocaine-prilocaine (EMLA) 2.5-2.5 % Cream APPLY A SMALL AMOUNT OF CREAM TO PORT SITE AN HOUR PRIOR TO TREATMENT (Patient not taking: Reported on 5/9/2022)     • Cholecalciferol (VITAMIN D) 50 MCG (2000 UT) Cap Take 4,000 Units by mouth every day. (Patient not taking: Reported on 5/9/2022)     • Pseudoeph-Doxylamine-DM-APAP (NYQUIL PO) Take  by mouth as needed. Takes prn for sleep approx twice a month (Patient not taking: Reported on 5/9/2022)     • denosumab (PROLIA) 60 MG/ML Solution Prefilled Syringe Inject 60 mg as instructed Once. Twice a year (Patient not taking: Reported on 5/9/2022)     • anastrozole (ARIMIDEX) 1 MG TABS Take 1 mg by mouth every day. Takes in the afternoon (Patient not taking: Reported on 5/9/2022)       No current facility-administered medications for this encounter.       ALLERGIES:  Flu virus vaccine    REVIEW OF SYSTEMS:  A review of systems for today's date of service was reviewed and uploaded into the electronic medical record.    PHYSICAL EXAM:  PERFORMANCE STATUS: 1  BP (!) 179/87   Pulse 87   Temp 36.7 °C (98.1 °F)   Wt 71.7 kg (158 lb 1.1 oz)   SpO2 96%   BMI 28.00 kg/m²   Physical Exam  Vitals reviewed.   HENT:      Head: Normocephalic.   Eyes:      Pupils: Pupils are equal, round, and reactive to light.   Cardiovascular:      Rate and Rhythm: Normal rate.   Pulmonary:      Effort: Pulmonary effort is normal.   Abdominal:      General: Abdomen is flat.   Musculoskeletal:         General: Normal range of motion.      Cervical back: Normal range of motion.   Neurological:      General: No focal deficit present.      Mental Status: She is alert.   Psychiatric:         Mood and Affect: Mood normal.         LABORATORY DATA:   Lab Results   Component Value Date/Time    WBC 4.5 (L) 06/06/2021 1656    WBC 7.2 07/27/2017 1235    WBC 9.1 03/16/2012 2330    HEMOGLOBIN 13.1 06/06/2021 1656    HEMOGLOBIN 15.7 07/27/2017 1235    HEMOGLOBIN 13.8  03/16/2012 2330    HEMATOCRIT 40.7 06/06/2021 1656    HEMATOCRIT 47.0 07/27/2017 1235    HEMATOCRIT 42.1 03/16/2012 2330    MCV 97.4 06/06/2021 1656    MCV 95.1 07/27/2017 1235    MCV 93.7 03/16/2012 2330    PLATELETCT 180 06/06/2021 1656    PLATELETCT 199 07/27/2017 1235    PLATELETCT 278 03/16/2012 2330    NEUTS 3.33 06/06/2021 1656    NEUTS 5.09 07/27/2017 1235      Lab Results   Component Value Date/Time    SODIUM 137 06/06/2021 1656    SODIUM 138 05/16/2020 0857    SODIUM 143 07/27/2017 1235    POTASSIUM 3.6 06/06/2021 1656    POTASSIUM 3.8 05/16/2020 0857    POTASSIUM 4.7 07/27/2017 1235    BUN 9 06/06/2021 1656    BUN 13 05/16/2020 0857    BUN 12 07/27/2017 1235    CREATININE 0.62 06/06/2021 1656    CREATININE 0.75 05/16/2020 0857    CREATININE 0.84 07/27/2017 1235    CALCIUM 7.9 (L) 06/06/2021 1656    CALCIUM 8.9 05/16/2020 0857    CALCIUM 9.5 07/27/2017 1235    ALBUMIN 3.6 06/06/2021 1656    ALBUMIN 4.4 07/27/2017 1235    ASTSGOT 18 06/06/2021 1656    ASTSGOT 20 07/27/2017 1235    ALKPHOSPHAT 68 06/06/2021 1656    ALKPHOSPHAT 54 07/27/2017 1235    IFNOTAFR >60 06/06/2021 1656    IFNOTAFR >60 05/16/2020 0857    IFNOTAFR >60 07/27/2017 1235       RADIOLOGY DATA:  MR-BRAIN-WITH & W/O    Result Date: 5/1/2022 5/1/2022 12:48 PM HISTORY/REASON FOR EXAM:  Small cell lung cancer, monitor; Metastasis - SRS Protocol Complete. TECHNIQUE/EXAM DESCRIPTION:   MRI of the brain without and with contrast. T1 sagittal, T2 fast spin-echo axial, T1 coronal, FLAIR coronal, diffusion-weighted and apparent diffusion coefficient (ADC map) axial images were obtained of the whole brain. T1 postcontrast axial and T1 postcontrast coronal images were obtained. The study was performed on a [employer] 1.5 Verna MRI scanner. 14 mL ProHance contrast was administered intravenously. COMPARISON:  MR brain dated 10/12/2021 FINDINGS: The calvariae are unremarkable. There are no extra-axial fluid collections. Parenchymal atrophy with associated  sulcal and ventricular prominence. There are confluent areas of T2 prolongation in the deep and periventricular white matter which are nonspecific but which most likely reflect areas of chronic microangiopathic ischemic change, though this can also be seen with gliosis and demyelinating processes. Remote, small left thalamic infarct again noted. There are no mass effects or shift of midline structures. There are no hemorrhagic lesions. The diffusion-weighted axial images show no evidence of acute cerebral infarction. The postcontrast images show no areas of abnormal parenchymal or meningeal enhancement. The brainstem and posterior fossa structures are unremarkable. Vascular flow voids in the vertebrobasilar and carotid arteries, [place], and dural venous sinuses are intact. The paranasal sinuses and mastoids in the field of view are unremarkable.     1.  No acute intracranial abnormality. 2.  No evidence of intracranial metastatic disease. Pathologic enhancement. 3.  Parenchymal atrophy and severe white matter disease likely representing microvascular ischemic change again seen.    MA-SCREENING MAMMO BILAT W/TOMOSYNTHESIS W/CAD    Result Date: 4/18/2022  4/15/2022 2:16 PM HISTORY/REASON FOR EXAM:  Routine Mammographic Screening. History of right lumpectomy. TECHNIQUE/EXAM DESCRIPTION: Bilateral tomosynthesis screening mammography was performed with standard mammographic images generated from the data set. Images were reviewed and interpreted with CAD. COMPARISON:   12/31/2020, 12/30/2019, 12/28/2018 FINDINGS: The breast parenchyma is heterogeneously dense which may obscure small masses. There is no dominant mass, suspicious calcification, or any secondary malignant sign. Postsurgical changes are seen in the right breast. There are stable calcifications.     1.  Breasts are heterogeneously dense, which may obscure small masses. No gross evidence of malignancy. 2.  Screening mammogram in one year is  recommended. R2 - CATEGORY 2: BENIGN FINDING(S) Ten to twenty percent of all cancers can be categorized as hereditary and the clinical and financial value of identifying patients and families at risk is well documented. If you have a personal or family history of breast, ovarian, fallopian tube, peritoneal or other cancer, please consult your physician regarding genetic counseling and testing.      IMPRESSION:    A 77 y.o. with   Visit Diagnoses     ICD-10-CM   1. Small cell lung cancer (HCC)  C34.90   2. Arthritis  M19.90     Small cell lung cancer (HCC)  Staging form: Lung, AJCC 8th Edition  - Clinical stage from 5/18/2020: Stage IIB (cT1c, cN1, cM0) - Signed by William Cruz M.D. on 5/18/2020  Type of lung cancer: Small cell lung cancer      CANCER STATUS:  No Evidence of Disease    RECOMMENDATIONS:   Patient doing well with no evidence of CNS relapse.  She is due for CT chest abdomen pelvis scan which I have ordered to do now and have repeated MRI for 6 months and we will see her back at that time.  She will see Dr. Katharine corley for her extracranial surveillance. I have referred her to PMR for consideration of joint injection for OA of right shoulder or hip.    Thank you for the opportunity to participate in her care.  If any questions or comments, please do not hesitate in calling.    Orders Placed This Encounter   • CT-CHEST,ABDOMEN,PELVIS WITH   • Referral to Pain Clinic

## 2022-05-09 NOTE — NON-PROVIDER
Patient was seen today in clinic with Dr. Cruz for Follow Up.  Vitals signs and weight were obtained and pain assessment was completed.  Allergies and medications were reviewed with the patient.  Toxicities of treatment assessed.     Vitals/Pain:  Vitals:    05/09/22 1304   BP: (!) 179/87   Pulse: 87   Temp: 36.7 °C (98.1 °F)   SpO2: 96%   Weight: 71.7 kg (158 lb 1.1 oz)   Pain Score: 5=Moderate Pain        Allergies:   Flu virus vaccine    Current Medications:  Current Outpatient Medications   Medication Sig Dispense Refill   • atorvastatin (LIPITOR) 10 MG Tab Take 10 mg by mouth every day. Takes in the afternoon     • cyclobenzaprine (FLEXERIL) 5 mg tablet Take 1 Tab by mouth 3 times a day as needed. (Patient not taking: Reported on 5/9/2022) 30 Tab 0   • magnesium oxide (MAG-OX) 400 MG Tab tablet Take 400 mg by mouth. (Patient not taking: Reported on 5/9/2022)     • zinc sulfate (ZINCATE) 220 (50 Zn) MG Cap Take 220 mg by mouth every day. (Patient not taking: Reported on 5/9/2022)     • lidocaine-prilocaine (EMLA) 2.5-2.5 % Cream APPLY A SMALL AMOUNT OF CREAM TO PORT SITE AN HOUR PRIOR TO TREATMENT (Patient not taking: Reported on 5/9/2022)     • Cholecalciferol (VITAMIN D) 50 MCG (2000 UT) Cap Take 4,000 Units by mouth every day. (Patient not taking: Reported on 5/9/2022)     • Pseudoeph-Doxylamine-DM-APAP (NYQUIL PO) Take  by mouth as needed. Takes prn for sleep approx twice a month (Patient not taking: Reported on 5/9/2022)     • denosumab (PROLIA) 60 MG/ML Solution Prefilled Syringe Inject 60 mg as instructed Once. Twice a year (Patient not taking: Reported on 5/9/2022)     • anastrozole (ARIMIDEX) 1 MG TABS Take 1 mg by mouth every day. Takes in the afternoon (Patient not taking: Reported on 5/9/2022)       No current facility-administered medications for this encounter.         PCP:  [facility]

## 2022-05-24 ENCOUNTER — OFFICE VISIT (OUTPATIENT)
Dept: PHYSICAL MEDICINE AND REHAB | Facility: MEDICAL CENTER | Age: 77
End: 2022-05-24
Payer: COMMERCIAL

## 2022-05-24 VITALS
SYSTOLIC BLOOD PRESSURE: 128 MMHG | DIASTOLIC BLOOD PRESSURE: 64 MMHG | WEIGHT: 159.17 LBS | TEMPERATURE: 97.8 F | HEART RATE: 93 BPM | HEIGHT: 62 IN | OXYGEN SATURATION: 93 % | BODY MASS INDEX: 29.29 KG/M2

## 2022-05-24 DIAGNOSIS — M25.561 RIGHT KNEE PAIN, UNSPECIFIED CHRONICITY: ICD-10-CM

## 2022-05-24 DIAGNOSIS — R10.31 GROIN PAIN, RIGHT: ICD-10-CM

## 2022-05-24 DIAGNOSIS — M25.511 RIGHT SHOULDER PAIN, UNSPECIFIED CHRONICITY: ICD-10-CM

## 2022-05-24 DIAGNOSIS — M25.652 STIFFNESS OF LEFT HIP JOINT: ICD-10-CM

## 2022-05-24 DIAGNOSIS — Z91.81 RISK FOR FALLS: ICD-10-CM

## 2022-05-24 DIAGNOSIS — M51.36 DDD (DEGENERATIVE DISC DISEASE), LUMBAR: ICD-10-CM

## 2022-05-24 PROCEDURE — 99204 OFFICE O/P NEW MOD 45 MIN: CPT | Performed by: PHYSICAL MEDICINE & REHABILITATION

## 2022-05-24 ASSESSMENT — PATIENT HEALTH QUESTIONNAIRE - PHQ9: CLINICAL INTERPRETATION OF PHQ2 SCORE: 0

## 2022-05-24 ASSESSMENT — PAIN SCALES - GENERAL: PAINLEVEL: 8=MODERATE-SEVERE PAIN

## 2022-05-24 ASSESSMENT — FIBROSIS 4 INDEX: FIB4 SCORE: 2.291486218791003532

## 2022-05-24 NOTE — PROGRESS NOTES
"New patient note    Physiatry (physical medicine and  Rehabilitation), interventional spine and sports medicine    Date of Service: 5/24/2022    Chief complaint:   Chief Complaint   Patient presents with   • New Patient     Arm and leg pain       HISTORY    HPI: Emily Barnes 77 y.o. female who presents today for evaluation of pain in her right shoulder and pain in her right thigh.    Patient reports that she has significant pain in the right hip and thigh that increased when she is walking.  Is been going on for at least 6 months and makes it very difficult for her to walk.  She does not use an assistive device because she does not want to look \"old.\"  She has not report back pain.    From her description the right leg pain is a killer.  She does have a history of right lower extremity fracture, sounds like involving the tibia at 41.  For her medications    For pain she has been taking ibuprofen 400 mg 3-4 times a day.    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  Pain seems to be gradually worsening and is now close to 8/10 on the NRS at the worst.         Medical records review:  I reviewed the note from the referring provider William Cruz M.D.    Previous treatments:    Physical Therapy: No    Medications the patient is tried: NSAIDs    Previous interventions: none    Previous surgeries to relieve the above pain:  none      ROS:   : urinary urgency at times  Red Flags ROS:   Fever, Chills, Sweats: Denies  Involuntary Weight Loss: Denies  Bladder Incontinence: Denies  Bowel Incontinence: Denies  Saddle Anesthesia: Denies    All other systems reviewed and negative.       PMHx:   Past Medical History:   Diagnosis Date   • Anemia 1962   • Breast cancer (HCC)    • Cancer (HCC) 2011    right breast   • Cancer (HCC) 2020    lung    • COPD (chronic obstructive pulmonary disease) ([facility])    • Cough    • Dental disorder     upper and lower dentures   • EMPHYSEMA    • High cholesterol    • " Pain     occasional breast pain   • Pneumonia 1948   • Renal disorder 1997    hx of stones   • Renal disorder     stones and cyst; 4/29/20 pt reports unaware of renal cyst    • S/P radiation therapy     for breast cancer 2011 [facility]   • Small cell lung cancer (HCC)    • Snoring    • Sputum production    • Urinary incontinence        PSHx:   Past Surgical History:   Procedure Laterality Date   • NY BRONCHOSCOPY,DIAGNOSTIC  5/7/2020    Procedure: BRONCHOSCOPY-FIBER OPTIC WITH POSSIBLE WASH, BRUSH, BROCHOALVEOLAR LAVAGE, BIOPSY FINE NEEDLE ASPIRATION;  Surgeon: Peter Foster M.D.;  Location: [facility];  Service: Pulmonary   • ENDOBRONCHIAL US ADD-ON  5/7/2020    Procedure: ENDOBRONCHIAL ULTRASOUND (EBUS);  Surgeon: Peter Foster M.D.;  Location: [facility];  Service: Pulmonary   • COLONOSCOPY WITH BIOPSY  3/15/2012    Performed by RC FANG at [facility]   • BREAST BIOPSY  8/23/2011    Performed by MOLLY PARRA at SURGERY SAME DAY [facility] ORS   • NODE BIOPSY SENTINEL  8/8/2011    Performed by MOLLY PARRA at SURGERY SAME DAY [facility] ORS   • LUMPECTOMY  8/8/2011    Performed by MOLLY PARRA at SURGERY SAME DAY [facility] ORS   • TONSILLECTOMY  1972   • [place] RADIATION THERAPY PLAN SIMPLE         Family history   Family History   Problem Relation Age of Onset   • Stroke Other    • Heart Disease Other    • Cancer Mother         lung cancer   • Heart Disease Father    • Cancer Sister         lung cancer   • Cancer Sister         lung cancer   • Cancer Sister         unknown cancer         Medications:   Current Outpatient Medications   Medication   • atorvastatin (LIPITOR) 10 MG Tab   • cyclobenzaprine (FLEXERIL) 5 mg tablet   • magnesium oxide (MAG-OX) 400 MG Tab tablet   • zinc sulfate (ZINCATE) 220 (50 Zn) MG Cap   • lidocaine-prilocaine (EMLA) 2.5-2.5 % Cream   • Cholecalciferol (VITAMIN D) 50 MCG (2000 UT) Cap   • Pseudoeph-Doxylamine-DM-APAP  "(NYQUIL PO)   • denosumab (PROLIA) 60 MG/ML Solution Prefilled Syringe   • anastrozole (ARIMIDEX) 1 MG TABS     No current facility-administered medications for this visit.       Allergies:   Allergies   Allergen Reactions   • Flu Virus Vaccine Hives       Social Hx:   Social History     Socioeconomic History   • Marital status:      Spouse name: Not on file   • Number of children: Not on file   • Years of education: Not on file   • Highest education level: Not on file   Occupational History   • Not on file   Tobacco Use   • Smoking status: Current Every Day Smoker     Packs/day: 1.00     Years: 58.00     Pack years: 58.00     Types: Cigarettes     Start date: 1962   • Smokeless tobacco: Never Used   Vaping Use   • Vaping Use: Never used   Substance and Sexual Activity   • Alcohol use: Yes     Comment: 4 per week   • Drug use: No   • Sexual activity: Not on file   Other Topics Concern   • Not on file   Social History Narrative    ** Merged History Encounter **          Social Determinants of Health     Financial Resource Strain: Not on file   Food Insecurity: Not on file   Transportation Needs: Not on file   Physical Activity: Not on file   Stress: Not on file   Social Connections: Not on file   Intimate Partner Violence: Not on file   Housing Stability: Not on file         EXAMINATION     Physical Exam:   Vitals: /64 (BP Location: Right arm, Patient Position: Sitting, BP Cuff Size: Adult long)   Pulse 93   Temp 36.6 °C (97.8 °F) (Temporal)   Ht 1.575 m (5' 2\")   Wt 72.2 kg (159 lb 2.8 oz)   SpO2 93%     Constitutional:   Body Habitus: Body mass index is 29.11 kg/m².  Cooperation: Fully cooperates with exam  Appearance: Well-groomed, well-nourished, not disheveled, in no acute distress    Eyes: No scleral icterus, no proptosis     ENT -no obvious auditory deficits, wearing a face mask    Skin -no rashes or lesions noted     Respiratory-  breathing comfortable on room air, no audible " wheezing    Cardiovascular- capillary refills less than 2 seconds. No lower extremity edema is noted.     Psychiatric- alert and oriented ×3. Normal affect.     Gait - normal gait, no use of ambulatory device, antalgic with Trendelenburg gait    Musculoskeletal -   Cervical spine   Inspection: No deformities of the skin over the cervical spine. No rashes or lesions.    Functional A/P ROM in all directions, without  pain      Spurling’s sign: negative bilaterally    No signs of muscular atrophy in bilateral upper extremities     No tenderness to palpation of the cervical facets    Right shoulder with pain in abduction after about 70 degrees of abduction.  Positive Daniels test.  Positive Neer's test.  Negative empty can test bilaterally    Thoracic/Lumbar Spine/Sacral Spine/Hips   Inspection: No evidence of atrophy in bilateral lower extremities throughout     ROM: full  AROM with flexion, extension, lateral flexion, and rotation bilaterally, without pain     Palpation:   No tenderness to palpation in midline at T1-T12 levels. No tenderness to palpation in the left and right of the midline T1-L5  palpation over SI joint: negative bilaterally    palpation over buttock: negative bilaterally    palpation in hip or over the greater trochanters: negative bilaterally      Lumbar spine Special tests  Neuro tension  Straight leg test negative bilaterally         HIP  Range of motion in the hips is significantly decreased in internal and external rotation bilaterally    SI joint tests  Observation patient sits on one buttocks: Negative  CONRAD test positive bilaterally for anterior groin pain    Knee  Right knee with tenderness to palpation over the medial and lateral joint line no medial or lateral instability.  Negative Lachman's      Neuro       Key points for the international standards for neurological classification of spinal cord injury (ISNCSCI) to light touch.     Dermatome R L   C4 2 2   C5 2 2   C6 2 2   C7 2 2   C8  2 2   T1 2 2   T2 2 2   L2 2 2   L3 2 2   L4 2 2   L5 2 2   S1 2 2   S2 2 2           Motor Exam Upper Extremities   ? Myotome R L   Shoulder flexion C5 5 5   Elbow flexion C5 5 5   Wrist extension C6 5 5   Elbow extension C7 5 5   Finger flexion C8 5 5   Finger abduction T1 5 5         Motor Exam Lower Extremities    ? Myotome R L   Hip flexion L2 5 5   Knee extension L3 5 5   Ankle dorsiflexion L4 5 5   Toe extension L5 5 5   Ankle plantarflexion S1 5 5         Ramirez’s sign negative bilaterally   Babinski sign negative bilaterally   Clonus of the ankle negative bilaterally     Reflexes  ?  R L   Biceps  3+ 3+   Brachioradialis  2+ 2+   Patella  3+ 3+   Achilles   2+ 2+       MEDICAL DECISION MAKING    Medical records review: see under HPI section.     DATA    Labs:   Lab Results   Component Value Date/Time    SODIUM 137 06/06/2021 04:56 PM    POTASSIUM 3.6 06/06/2021 04:56 PM    CHLORIDE 104 06/06/2021 04:56 PM    CO2 19 (L) 06/06/2021 04:56 PM    ANION 14.0 06/06/2021 04:56 PM    GLUCOSE 109 (H) 06/06/2021 04:56 PM    BUN 9 06/06/2021 04:56 PM    CREATININE 0.62 06/06/2021 04:56 PM    CALCIUM 7.9 (L) 06/06/2021 04:56 PM    ASTSGOT 18 06/06/2021 04:56 PM    ALTSGPT 11 06/06/2021 04:56 PM    TBILIRUBIN 0.3 06/06/2021 04:56 PM    ALBUMIN 3.6 06/06/2021 04:56 PM    TOTPROTEIN 6.2 06/06/2021 04:56 PM    GLOBULIN 2.6 06/06/2021 04:56 PM    AGRATIO 1.4 06/06/2021 04:56 PM       No results found for: PROTHROMBTM, INR     Lab Results   Component Value Date/Time    WBC 4.5 (L) 06/06/2021 04:56 PM    RBC 4.18 (L) 06/06/2021 04:56 PM    HEMOGLOBIN 13.1 06/06/2021 04:56 PM    HEMATOCRIT 40.7 06/06/2021 04:56 PM    MCV 97.4 06/06/2021 04:56 PM    MCH 31.3 06/06/2021 04:56 PM    MCHC 32.2 (L) 06/06/2021 04:56 PM    MPV 9.7 06/06/2021 04:56 PM    NEUTSPOLYS 73.90 (H) 06/06/2021 04:56 PM    LYMPHOCYTES 18.60 (L) 06/06/2021 04:56 PM    MONOCYTES 6.70 06/06/2021 04:56 PM    EOSINOPHILS 0.20 06/06/2021 04:56 PM    BASOPHILS 0.20  06/06/2021 04:56 PM        No results found for: HBA1C     Imaging: I personally reviewed following images, these are my reads  CT chest abdomen pelvis June 5, 2020  Reviewed for orthopedic issues.  There are degenerative changes in the hips bilaterally.  Degenerative changes in the lumbar spine greatest at L4-5 with decreased disc height and osteophytes noted.    IMAGING radiology reads. 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  Postoperative changes right breast. Postoperative seroma unchanged.  No evidence metastatic disease within the abdomen and pelvis.  Hepatic steatosis.  Bilateral renal cysts. No hydronephrosis.  Scattered diverticula colon. No free fluid.  8.8 mm left adrenal gland nodule unchanged.      Results for orders placed during the hospital encounter of 05/01/22    MR-BRAIN-WITH & W/O    Impression  1.  No acute intracranial abnormality.  2.  No evidence of intracranial metastatic disease. Pathologic enhancement.  3.  Parenchymal atrophy and severe white matter disease likely representing microvascular ischemic change again seen.          Results for orders placed during the hospital encounter of 02/02/12    MR-CHEST-WITH & W/O AND SEQ    Impression  1. There is no evidence of metastatic disease involving the sternum or manubrium.  There is minimal degenerative change.                                                                                               Diagnosis   Visit Diagnoses     ICD-10-CM   1. Right shoulder pain, unspecified chronicity  M25.511   2. Groin pain, right  R10.31   3. Stiffness of left hip joint  M25.652   4. DDD (degenerative disc disease), lumbar  M51.36   5. Right knee pain, unspecified chronicity  M25.561   6. Risk for falls  Z91.81           ASSESSMENT:  Emily Barnes 77 y.o. female seen for above     Emily was seen today for new  patient.    Diagnoses and all orders for this visit:    Right shoulder pain, unspecified chronicity  -     DX-SHOULDER 2+ RIGHT; Future    Groin pain, right  -     DX-HIP-BILATERAL-WITH PELVIS-3/4 VIEWS; Future    Stiffness of left hip joint  -     DX-HIP-BILATERAL-WITH PELVIS-3/4 VIEWS; Future    DDD (degenerative disc disease), lumbar    Right knee pain, unspecified chronicity  -     DX-KNEE 3 VIEWS RIGHT; Future    Risk for falls  -     Patient identified as fall risk.  Appropriate orders and counseling given.      1.  Discussed the limitations to range of motion of her hips bilaterally.  Plan for x-rays of the hips and right knee.  We also ordered x-rays of her right shoulder.  Plan to reassess after x-rays.  2.  We discussed possible physical therapy but she declines this.  3.  I have encouraged her to use a single-point cane but she declines.  4.  Encouraged her to continue to limit her use of ibuprofen to no more than 400 mg at a time about 3 times a day.  She has tolerated this without side effects.    Follow-up: Return After x-rays.      Thank you very much for asking me to participate in Emily Barnes's care.  Please contact me with any questions or concerns.    Please note that this dictation was created using voice recognition software. I have made every reasonable attempt to correct obvious errors but there may be errors of grammar and content that I may have overlooked prior to finalization of this note.      Riely Phipps MD  Physical Medicine and Rehabilitation  Interventional Spine and Sports Physiatry  [facility]           William Ngo M.D..

## 2022-06-01 ENCOUNTER — HOSPITAL ENCOUNTER (OUTPATIENT)
Dept: RADIOLOGY | Facility: MEDICAL CENTER | Age: 77
End: 2022-06-01
Attending: RADIOLOGY
Payer: COMMERCIAL

## 2022-06-01 ENCOUNTER — TELEPHONE (OUTPATIENT)
Dept: RADIATION ONCOLOGY | Facility: MEDICAL CENTER | Age: 77
End: 2022-06-01
Payer: COMMERCIAL

## 2022-06-01 ENCOUNTER — HOSPITAL ENCOUNTER (OUTPATIENT)
Dept: LAB | Facility: MEDICAL CENTER | Age: 77
End: 2022-06-01
Attending: RADIOLOGY
Payer: COMMERCIAL

## 2022-06-01 DIAGNOSIS — C34.90 SMALL CELL LUNG CANCER (HCC): ICD-10-CM

## 2022-06-01 LAB
ANION GAP SERPL CALC-SCNC: 11 MMOL/L (ref 7–16)
BUN SERPL-MCNC: 16 MG/DL (ref 8–22)
CALCIUM SERPL-MCNC: 9.4 MG/DL (ref 8.4–10.2)
CHLORIDE SERPL-SCNC: 104 MMOL/L (ref 96–112)
CO2 SERPL-SCNC: 22 MMOL/L (ref 20–33)
CREAT SERPL-MCNC: 0.78 MG/DL (ref 0.5–1.4)
GFR SERPLBLD CREATININE-BSD FMLA CKD-EPI: 79 ML/MIN/1.73 M 2
GLUCOSE SERPL-MCNC: 89 MG/DL (ref 65–99)
POTASSIUM SERPL-SCNC: 4 MMOL/L (ref 3.6–5.5)
SODIUM SERPL-SCNC: 137 MMOL/L (ref 135–145)

## 2022-06-01 PROCEDURE — 71260 CT THORAX DX C+: CPT

## 2022-06-01 PROCEDURE — 36415 COLL VENOUS BLD VENIPUNCTURE: CPT

## 2022-06-01 PROCEDURE — 80048 BASIC METABOLIC PNL TOTAL CA: CPT

## 2022-06-01 PROCEDURE — 700117 HCHG RX CONTRAST REV CODE 255: Performed by: RADIOLOGY

## 2022-06-01 RX ADMIN — IOHEXOL 100 ML: 350 INJECTION, SOLUTION INTRAVENOUS at 11:58

## 2022-06-01 NOTE — TELEPHONE ENCOUNTER
----- Message from Gisell Hussein sent at 5/31/2022 10:12 AM PDT -----  Regarding: Lab Order  Hello ,   Please enter lab order in epic for BMP . Emily is coming in we 6/1/22  for the Ct , and current labs are required for the IV contrast     Thank you   Gisell   [facility]  [phone]

## 2022-06-03 ENCOUNTER — HOSPITAL ENCOUNTER (OUTPATIENT)
Dept: RADIATION ONCOLOGY | Facility: MEDICAL CENTER | Age: 77
End: 2022-06-30
Attending: RADIOLOGY
Payer: COMMERCIAL

## 2022-06-03 VITALS
SYSTOLIC BLOOD PRESSURE: 182 MMHG | WEIGHT: 161.82 LBS | HEART RATE: 80 BPM | OXYGEN SATURATION: 97 % | BODY MASS INDEX: 29.6 KG/M2 | DIASTOLIC BLOOD PRESSURE: 81 MMHG

## 2022-06-03 DIAGNOSIS — C34.90 SMALL CELL LUNG CANCER (HCC): ICD-10-CM

## 2022-06-03 PROCEDURE — 99213 OFFICE O/P EST LOW 20 MIN: CPT | Performed by: RADIOLOGY

## 2022-06-03 PROCEDURE — 99212 OFFICE O/P EST SF 10 MIN: CPT | Performed by: RADIOLOGY

## 2022-06-03 ASSESSMENT — PAIN SCALES - GENERAL: PAINLEVEL: 6=MODERATE PAIN

## 2022-06-03 ASSESSMENT — FIBROSIS 4 INDEX: FIB4 SCORE: 2.291486218791003532

## 2022-06-03 NOTE — PROGRESS NOTES
RADIATION ONCOLOGY FOLLOW-UP    DATE OF SERVICE: 6/3/2022    IDENTIFICATION:   A 77 y.o. female with limited stage SCLC s/p chemoRT 45Gy in 30 fx.    Small cell lung cancer (HCC)  Staging form: Lung, AJCC 8th Edition  - Clinical stage from 5/18/2020: Stage IIB (cT1c, cN1, cM0) - Signed by William Cruz M.D. on 5/18/2020  Type of lung cancer: Small cell lung cancer      RADIATION SUMMARY:  [facility] Treatment Information        Some values may be hidden. Unless noted otherwise, only the newest values recorded on each date are displayed.         [facility] Treatment Summary 7/27/20   Course First Treatment Date 07/06/2020   Course Last Treatment Date 07/24/2020   R_Lung_Nodes Plan from Course C2_SCLC   R_Lung_Nodes Reference Point from Course C2_SCLC   R_Lung_Nodes CP Reference Point from Course C2_SCLC             HISTORY OF PRESENT ILLNESS:   Patient originally presented with abnormal CT scan finding in July 18 2018 from her breast cancer surveillance.  It was grossly stable until December 11, 2019 which showed slight increase in size of right hilar bunny tissue now measuring 2.4 x 1.5 cm with no evidence of primary small cell lung cancer.  Patient had her screening mammogram December 31, 2019 which showed no evidence of latency.  Patient was seen by Dr. Crain in pulmonary who ordered a PET CT scan in April 6, 2020 which showed FDG avid right hilar lymph node concerning for metastatic disease.  Patient underwent NIKOLAS guided biopsy by Dr. Foster with 11 R and 10 R sampled showing small cell lung cancer TTF-1 positive synaptophysin positive and chromogranin positive with KATHERIN-3 negative.  MRI brain was performed today May 18, 2020 and per my read shows no evidence of brain metastasis.  She is currently asymptomatic without any pain and is not on oxygen she denies any shortness of breath but does have a clear cough with no hemoptysis.     11/6/20  Called patient to order restaging MR brain and CT chest with contrast. Completed  chemoradiation for limited stage small cell lung cancer, but declined further chemo or PCI to brain.     1/8/21  Patient doing well with increased energy and appetite. CT chest and MRI brain shows shrinkage of right hilar mass small 3 mm left upper lobe nodule nonspecific and no evidence of brain metastasis.  She denies any cough, shortness of breath or hemoptysis or weight loss.     4/12/21  Patient is doing well.  She has no evidence of CNS relapse in her CT chest today shows 2.5 mm right upper lobe nodule unchanged and right hilar lymph node measuring 5.6 x 8.8 mm previously 10 x 15 mm.  No other evidence of disease.  She is doing well overall she did have some nausea with the contrast administration.  She denies any weight loss or hemoptysis     10/14/21  Patient is doing well had MRI brain October 2021 which showed no evidence of metastasis.  I have ordered a repeat CT chest abdomen pelvis scan today to evaluate the rest of her body.  Patient denies any cough shortness of breath or weight loss.     5/2021  Patient doing well had MRI brain which showed no evidence of disease recurrence.  She has some joint pain especially in the right shoulder and right knee.  She did have bone scan which showed no evidence of bone metastasis but significant arthritis.      Interval History:  Patient is doing well she did have restaging CT chest abdomen pelvis scan June 1, 2022 which showed overall stability however new 2.5 cm groundglass opacity of the complex of the right major and minor fissure while likely infectious or inflammatory should be closely followed.  No new lesions or lymphadenopathy in chest and pelvis.  Overall patient feeling well she still has arthritis that is seeing a physiatrist for this    PROBLEM LIST:  Patient Active Problem List   Diagnosis   • Pulmonary nodule/R hilar mass   • Breast cancer    • Small cell lung cancer (HCC)   • COPD with asthma (HCC)   • Risk for falls       CURRENT  MEDICATIONS:  Current Outpatient Medications   Medication Sig Dispense Refill   • cyclobenzaprine (FLEXERIL) 5 mg tablet Take 1 Tab by mouth 3 times a day as needed. (Patient not taking: No sig reported) 30 Tab 0   • magnesium oxide (MAG-OX) 400 MG Tab tablet Take 400 mg by mouth. (Patient not taking: No sig reported)     • zinc sulfate (ZINCATE) 220 (50 Zn) MG Cap Take 220 mg by mouth every day. (Patient not taking: No sig reported)     • lidocaine-prilocaine (EMLA) 2.5-2.5 % Cream APPLY A SMALL AMOUNT OF CREAM TO PORT SITE AN HOUR PRIOR TO TREATMENT (Patient not taking: No sig reported)     • Cholecalciferol (VITAMIN D) 50 MCG (2000 UT) Cap Take 4,000 Units by mouth every day. (Patient not taking: No sig reported)     • Pseudoeph-Doxylamine-DM-APAP (NYQUIL PO) Take  by mouth as needed. Takes prn for sleep approx twice a month (Patient not taking: No sig reported)     • atorvastatin (LIPITOR) 10 MG Tab Take 10 mg by mouth every day. Takes in the afternoon     • denosumab (PROLIA) 60 MG/ML Solution Prefilled Syringe Inject 60 mg as instructed Once. Twice a year (Patient not taking: No sig reported)     • anastrozole (ARIMIDEX) 1 MG TABS Take 1 mg by mouth every day. Takes in the afternoon (Patient not taking: No sig reported)       No current facility-administered medications for this encounter.       ALLERGIES:  Flu virus vaccine    REVIEW OF SYSTEMS:  A review of systems for today's date of service was reviewed and uploaded into the electronic medical record.    PHYSICAL EXAM:  PERFORMANCE STATUS:   BP (!) 182/81 (BP Location: Left arm, Patient Position: Sitting)   Pulse 80   Wt 73.4 kg (161 lb 13.1 oz)   SpO2 97%   BMI 29.60 kg/m²   Physical Exam  Vitals reviewed.   HENT:      Head: Normocephalic.   Eyes:      Pupils: Pupils are equal, round, and reactive to light.   Cardiovascular:      Rate and Rhythm: Normal rate.   Pulmonary:      Effort: Pulmonary effort is normal.   Abdominal:      General: Abdomen is  flat.   Musculoskeletal:         General: Normal range of motion.   Neurological:      General: No focal deficit present.      Mental Status: She is alert.   Psychiatric:         Mood and Affect: Mood normal.         LABORATORY DATA:   Lab Results   Component Value Date/Time    WBC 4.5 (L) 06/06/2021 1656    WBC 7.2 07/27/2017 1235    WBC 9.1 03/16/2012 2330    HEMOGLOBIN 13.1 06/06/2021 1656    HEMOGLOBIN 15.7 07/27/2017 1235    HEMOGLOBIN 13.8 03/16/2012 2330    HEMATOCRIT 40.7 06/06/2021 1656    HEMATOCRIT 47.0 07/27/2017 1235    HEMATOCRIT 42.1 03/16/2012 2330    MCV 97.4 06/06/2021 1656    MCV 95.1 07/27/2017 1235    MCV 93.7 03/16/2012 2330    PLATELETCT 180 06/06/2021 1656    PLATELETCT 199 07/27/2017 1235    PLATELETCT 278 03/16/2012 2330    NEUTS 3.33 06/06/2021 1656    NEUTS 5.09 07/27/2017 1235      Lab Results   Component Value Date/Time    SODIUM 137 06/01/2022 1051    SODIUM 137 06/06/2021 1656    SODIUM 138 05/16/2020 0857    POTASSIUM 4.0 06/01/2022 1051    POTASSIUM 3.6 06/06/2021 1656    POTASSIUM 3.8 05/16/2020 0857    BUN 16 06/01/2022 1051    BUN 9 06/06/2021 1656    BUN 13 05/16/2020 0857    CREATININE 0.78 06/01/2022 1051    CREATININE 0.62 06/06/2021 1656    CREATININE 0.75 05/16/2020 0857    CALCIUM 9.4 06/01/2022 1051    CALCIUM 7.9 (L) 06/06/2021 1656    CALCIUM 8.9 05/16/2020 0857    ALBUMIN 3.6 06/06/2021 1656    ALBUMIN 4.4 07/27/2017 1235    ASTSGOT 18 06/06/2021 1656    ASTSGOT 20 07/27/2017 1235    ALKPHOSPHAT 68 06/06/2021 1656    ALKPHOSPHAT 54 07/27/2017 1235    IFNOTAFR >60 06/06/2021 1656    IFNOTAFR >60 05/16/2020 0857    IFNOTAFR >60 07/27/2017 1235       RADIOLOGY DATA:  CT-CHEST,ABDOMEN,PELVIS WITH    Result Date: 6/1/2022 6/1/2022 11:24 AM HISTORY/REASON FOR EXAM:  History of small cell lung cancer, restaging. TECHNIQUE/EXAM DESCRIPTION: CT scan of the chest, abdomen and pelvis with contrast. Thin-section helical scanning was obtained with intravenous contrast from the  lung apices through the pubic symphysis to include the chest, abdomen and pelvis. 100 mL of Omnipaque 350 nonionic contrast was administered intravenously without complication. Low dose optimization technique was utilized for this CT exam including automated exposure control and adjustment of the mA and/or kV according to patient size. COMPARISON: 10/27/2021. FINDINGS: CT CHEST: Lungs: Emphysema. New 2.5 cm groundglass opacity at the confluence of the right major and minor fissures, most likely infectious/inflammatory. Stable micronodule in the right upper lobe. Another micronodule in the middle lobe is stable as well. Stable granuloma in the right lung apex. No new pulmonary nodules or masses. No pleural effusions. Mediastinum: Unremarkable thyroid. No mediastinal mass. Nodes: No enlarged nodes by size criteria. Heart: Not enlarged. No pericardial effusion. Coronary calcifications. Aorta and great vessels: No aneurysm. Atherosclerosis. Stable postsurgical changes in the right breast, with stable postsurgical collection in the deep right breast. Associated scarring. CT ABDOMEN/PELVIS: Liver: Stable tiny left hepatic cyst. No intrahepatic biliary dilatation. Gallbladder: No mural thickening. No radiopaque gallstones. Common bile duct: Nondilated. Pancreas: Unremarkable. Spleen: No mass. Adrenals: Stable adenomatoid hyperplasia of the left adrenal gland. Kidneys: Stable renal cysts, which do not require imaging follow-up. Renal vascular calcification in the left kidney. No suspicious mass lesions. No hydronephrosis. Stomach, small bowel, colon: Stable small hiatal hernia. No bowel wall thickening or obstruction. Normal appendix. Colonic diverticulosis. Peritoneal cavity: No ascites. Lymph nodes: No enlarged nodes by size criteria. Aorta: Stable 3.9 cm infrarenal abdominal aortic aneurysm.. Pelvic organs: Unremarkable. MUSCULOSKELETAL STRUCTURES: No acute fracture or destructive lesion. Old bilateral rib fractures.      1.  New 2.5 cm groundglass opacity at the confluence of the right major and minor fissures. While it is most likely infectious/inflammatory, it should be closely followed. 2.  Tiny right pulmonary nodules are stable. 3.  No other new lesions or lymphadenopathy in the chest, abdomen or pelvis. 4.  Stable postsurgical changes in the right breast. 5.  Stable 3.9 cm infrarenal abdominal aortic aneurysm. 6.  Emphysema. 7.  Stable nodular thickening of the left adrenal gland, likely adenomatoid hyperplasia.      IMPRESSION:    A 77 y.o. with   Small cell lung cancer (HCC)  Staging form: Lung, AJCC 8th Edition  - Clinical stage from 5/18/2020: Stage IIB (cT1c, cN1, cM0) - Signed by William Cruz M.D. on 5/18/2020  Type of lung cancer: Small cell lung cancer      CANCER STATUS:  No Evidence of Disease    RECOMMENDATIONS:   I have recommended 3-month surveillance CT chest abdomen pelvis scan given new groundglass opacity.  She has surveillance MRI planned for October 2022.  We will see her back at that time.    Thank you for the opportunity to participate in her care.  If any questions or comments, please do not hesitate in calling.    Orders Placed This Encounter   • CT-CHEST,ABDOMEN,PELVIS WITH

## 2022-06-03 NOTE — NON-PROVIDER
Patient was seen today in clinic with Dr. Cruz for follow up.  Vitals signs and weight were obtained and pain assessment was completed.  Allergies and medications were reviewed with the patient.  Toxicities of treatment assessed.     Vitals/Pain:  Vitals:    06/03/22 1047   BP: (!) 182/81   BP Location: Left arm   Patient Position: Sitting   Pulse: 80   SpO2: 97%   Weight: 73.4 kg (161 lb 13.1 oz)   Pain Score: 6=Moderate Pain        Allergies:   Flu virus vaccine    Current Medications:  Current Outpatient Medications   Medication Sig Dispense Refill   • cyclobenzaprine (FLEXERIL) 5 mg tablet Take 1 Tab by mouth 3 times a day as needed. (Patient not taking: No sig reported) 30 Tab 0   • magnesium oxide (MAG-OX) 400 MG Tab tablet Take 400 mg by mouth. (Patient not taking: No sig reported)     • zinc sulfate (ZINCATE) 220 (50 Zn) MG Cap Take 220 mg by mouth every day. (Patient not taking: No sig reported)     • lidocaine-prilocaine (EMLA) 2.5-2.5 % Cream APPLY A SMALL AMOUNT OF CREAM TO PORT SITE AN HOUR PRIOR TO TREATMENT (Patient not taking: No sig reported)     • Cholecalciferol (VITAMIN D) 50 MCG (2000 UT) Cap Take 4,000 Units by mouth every day. (Patient not taking: No sig reported)     • Pseudoeph-Doxylamine-DM-APAP (NYQUIL PO) Take  by mouth as needed. Takes prn for sleep approx twice a month (Patient not taking: No sig reported)     • atorvastatin (LIPITOR) 10 MG Tab Take 10 mg by mouth every day. Takes in the afternoon     • denosumab (PROLIA) 60 MG/ML Solution Prefilled Syringe Inject 60 mg as instructed Once. Twice a year (Patient not taking: No sig reported)     • anastrozole (ARIMIDEX) 1 MG TABS Take 1 mg by mouth every day. Takes in the afternoon (Patient not taking: No sig reported)       No current facility-administered medications for this encounter.           Pernell Goff Ass't

## 2022-06-06 ENCOUNTER — HOSPITAL ENCOUNTER (OUTPATIENT)
Dept: RADIOLOGY | Facility: MEDICAL CENTER | Age: 77
End: 2022-06-06
Attending: PHYSICAL MEDICINE & REHABILITATION
Payer: COMMERCIAL

## 2022-06-06 DIAGNOSIS — M25.511 RIGHT SHOULDER PAIN, UNSPECIFIED CHRONICITY: ICD-10-CM

## 2022-06-06 DIAGNOSIS — R10.31 GROIN PAIN, RIGHT: ICD-10-CM

## 2022-06-06 DIAGNOSIS — M25.652 STIFFNESS OF LEFT HIP JOINT: ICD-10-CM

## 2022-06-06 DIAGNOSIS — M25.561 RIGHT KNEE PAIN, UNSPECIFIED CHRONICITY: ICD-10-CM

## 2022-06-06 PROCEDURE — 73562 X-RAY EXAM OF KNEE 3: CPT | Mod: RT

## 2022-06-06 PROCEDURE — 73030 X-RAY EXAM OF SHOULDER: CPT | Mod: RT

## 2022-06-06 PROCEDURE — 73522 X-RAY EXAM HIPS BI 3-4 VIEWS: CPT

## 2022-06-30 ENCOUNTER — OFFICE VISIT (OUTPATIENT)
Dept: PHYSICAL MEDICINE AND REHAB | Facility: MEDICAL CENTER | Age: 77
End: 2022-06-30
Payer: COMMERCIAL

## 2022-06-30 VITALS
BODY MASS INDEX: 28.75 KG/M2 | HEIGHT: 63 IN | OXYGEN SATURATION: 94 % | SYSTOLIC BLOOD PRESSURE: 124 MMHG | TEMPERATURE: 98.1 F | HEART RATE: 83 BPM | WEIGHT: 162.26 LBS | DIASTOLIC BLOOD PRESSURE: 80 MMHG

## 2022-06-30 DIAGNOSIS — M16.0 PRIMARY OSTEOARTHRITIS OF BOTH HIPS: ICD-10-CM

## 2022-06-30 DIAGNOSIS — M17.11 PRIMARY OSTEOARTHRITIS OF RIGHT KNEE: ICD-10-CM

## 2022-06-30 DIAGNOSIS — M19.011 ARTHRITIS OF SHOULDER REGION, RIGHT: ICD-10-CM

## 2022-06-30 PROCEDURE — 99214 OFFICE O/P EST MOD 30 MIN: CPT | Performed by: PHYSICAL MEDICINE & REHABILITATION

## 2022-06-30 ASSESSMENT — PATIENT HEALTH QUESTIONNAIRE - PHQ9: CLINICAL INTERPRETATION OF PHQ2 SCORE: 0

## 2022-06-30 ASSESSMENT — PAIN SCALES - GENERAL: PAINLEVEL: 8=MODERATE-SEVERE PAIN

## 2022-06-30 ASSESSMENT — FIBROSIS 4 INDEX: FIB4 SCORE: 2.291486218791003532

## 2022-06-30 NOTE — PROGRESS NOTES
Follow-up patient note    Physiatry (physical medicine and  Rehabilitation), interventional spine and sports medicine    Date of Service: 6/30/2022    Chief complaint:   Chief Complaint   Patient presents with   • Follow-Up     Arms and leg pain       HISTORY    HPI: Emily Barnes 77 y.o. female who presents today for evaluation of pain in her right shoulder and pain in her right thigh.    Pain is limiting, here to review films.  Taking tylenol 1300mg twice a day, ibuprofen 600mg some days.    Here to review films.    Right hip and shoulder pain are the most troublesome.  Right hip makes walking worse, not using a cane.      Working a lot.  Does have Mondays off, usually.       Medical records review:  I reviewed the note from the referring provider William Cruz M.D.    Previous treatments:    Physical Therapy: No    Medications the patient is tried: NSAIDs    Previous interventions: none    Previous surgeries to relieve the above pain:  none      ROS:   : urinary urgency at times  Red Flags ROS:   Fever, Chills, Sweats: Denies  Involuntary Weight Loss: Denies  Bladder Incontinence: Denies  Bowel Incontinence: Denies  Saddle Anesthesia: Denies    All other systems reviewed and negative.       PMHx:   Past Medical History:   Diagnosis Date   • Anemia 1962   • Breast cancer (HCC)    • Cancer (HCC) 2011    right breast   • Cancer (HCC) 2020    lung    • COPD (chronic obstructive pulmonary disease) (HCC)    • Cough    • Dental disorder     upper and lower dentures   • EMPHYSEMA    • High cholesterol    • Pain     occasional breast pain   • Pneumonia 1948   • Renal disorder 1997    hx of stones   • Renal disorder     stones and cyst; 4/29/20 pt reports unaware of renal cyst    • S/P radiation therapy     [facility]   • Small cell lung cancer (HCC)    • Snoring    • Sputum production    • Urinary incontinence        PSHx:   Past Surgical History:   Procedure Laterality Date   • OR  BRONCHOSCOPY,DIAGNOSTIC  5/7/2020    Procedure: BRONCHOSCOPY-FIBER OPTIC WITH POSSIBLE WASH, BRUSH, BROCHOALVEOLAR LAVAGE, BIOPSY FINE NEEDLE ASPIRATION;  Surgeon: Peter Foster M.D.;  Location: [facility];  Service: Pulmonary   • ENDOBRONCHIAL US ADD-ON  5/7/2020    Procedure: ENDOBRONCHIAL ULTRASOUND (EBUS);  Surgeon: Peter Foster M.D.;  Location: [facility];  Service: Pulmonary   • COLONOSCOPY WITH BIOPSY  3/15/2012    Performed by RC FANG at [facility]   • BREAST BIOPSY  8/23/2011    Performed by MOLLY PARRA at SURGERY SAME DAY [facility]   • NODE BIOPSY SENTINEL  8/8/2011    Performed by MOLLY PARRA at SURGERY SAME DAY [facility]   • LUMPECTOMY  8/8/2011    Performed by MOLLY PARRA at SURGERY SAME DAY [facility]   • TONSILLECTOMY  1972   • SD RADIATION THERAPY PLAN SIMPLE         Family history   Family History   Problem Relation Age of Onset   • Stroke Other    • Heart Disease Other    • Cancer Mother         lung cancer   • Heart Disease Father    • Cancer Sister         lung cancer   • Cancer Sister         lung cancer   • Cancer Sister         unknown cancer         Medications:   Current Outpatient Medications   Medication   • atorvastatin (LIPITOR) 10 MG Tab   • cyclobenzaprine (FLEXERIL) 5 mg tablet   • magnesium oxide (MAG-OX) 400 MG Tab tablet   • zinc sulfate (ZINCATE) 220 (50 Zn) MG Cap   • lidocaine-prilocaine (EMLA) 2.5-2.5 % Cream   • Cholecalciferol (VITAMIN D) 50 MCG (2000 UT) Cap   • Pseudoeph-Doxylamine-DM-APAP (NYQUIL PO)   • denosumab (PROLIA) 60 MG/ML Solution Prefilled Syringe   • anastrozole (ARIMIDEX) 1 MG TABS     No current facility-administered medications for this visit.       Allergies:   Allergies   Allergen Reactions   • Flu Virus Vaccine Hives       Social Hx:   Social History     Socioeconomic History   • Marital status:      Spouse name: Not on file   • Number of children: Not on file   •  "Years of education: Not on file   • Highest education level: Not on file   Occupational History   • Not on file   Tobacco Use   • Smoking status: Current Every Day Smoker     Packs/day: 1.00     Years: 58.00     Pack years: 58.00     Types: Cigarettes     Start date: 1962   • Smokeless tobacco: Never Used   Vaping Use   • Vaping Use: Never used   Substance and Sexual Activity   • Alcohol use: Yes     Comment: 4 per week   • Drug use: No   • Sexual activity: Not on file   Other Topics Concern   • Not on file   Social History Narrative    ** Merged History Encounter **          Social Determinants of Health     Financial Resource Strain: Not on file   Food Insecurity: Not on file   Transportation Needs: Not on file   Physical Activity: Not on file   Stress: Not on file   Social Connections: Not on file   Intimate Partner Violence: Not on file   Housing Stability: Not on file         EXAMINATION     Physical Exam:   Vitals: /80 (BP Location: Right arm, Patient Position: Sitting, BP Cuff Size: Adult long)   Pulse 83   Temp 36.7 °C (98.1 °F) (Temporal)   Ht 1.6 m (5' 3\")   Wt 73.6 kg (162 lb 4.1 oz)   SpO2 94%     Constitutional:   Body Habitus: Body mass index is 28.74 kg/m².  Cooperation: Fully cooperates with exam  Appearance: Well-groomed, well-nourished, not disheveled, in no acute distress    Eyes: No scleral icterus, no proptosis     ENT -no obvious auditory deficits, wearing a face mask    Skin -no rashes or lesions noted     Respiratory-  breathing comfortable on room air, no audible wheezing    Cardiovascular- No lower extremity edema is noted.     Psychiatric- alert and oriented ×3. Normal affect.     Gait - normal gait, no use of ambulatory device, antalgic with Trendelenburg gait    Musculoskeletal -   Cervical spine   Inspection: No deformities of the skin over the cervical spine. No rashes or lesions.    Right shoulder with pain in abduction after about 70 degrees of abduction.  Positive Daniels " test.  Positive Neer's test.  Negative empty can test bilaterally    Thoracic/Lumbar Spine/Sacral Spine/Hips   Inspection: No evidence of atrophy in bilateral lower extremities throughout      HIP  Range of motion in the hips is significantly decreased in internal and external rotation bilaterally    Neuro       Key points for the international standards for neurological classification of spinal cord injury (ISNCSCI) to light touch.     Dermatome R L   L2 2 2   L3 2 2   L4 2 2   L5 2 2   S1 2 2   S2 2 2       Motor Exam Lower Extremities    ? Myotome R L   Hip flexion L2 5 5   Knee extension L3 5 5   Ankle dorsiflexion L4 5 5   Toe extension L5 5 5   Ankle plantarflexion S1 5 5         MEDICAL DECISION MAKING    Medical records review: see under HPI section.     DATA    Labs:   Lab Results   Component Value Date/Time    SODIUM 137 06/01/2022 10:51 AM    POTASSIUM 4.0 06/01/2022 10:51 AM    CHLORIDE 104 06/01/2022 10:51 AM    CO2 22 06/01/2022 10:51 AM    ANION 11.0 06/01/2022 10:51 AM    GLUCOSE 89 06/01/2022 10:51 AM    BUN 16 06/01/2022 10:51 AM    CREATININE 0.78 06/01/2022 10:51 AM    CALCIUM 9.4 06/01/2022 10:51 AM    ASTSGOT 18 06/06/2021 04:56 PM    ALTSGPT 11 06/06/2021 04:56 PM    TBILIRUBIN 0.3 06/06/2021 04:56 PM    ALBUMIN 3.6 06/06/2021 04:56 PM    TOTPROTEIN 6.2 06/06/2021 04:56 PM    GLOBULIN 2.6 06/06/2021 04:56 PM    AGRATIO 1.4 06/06/2021 04:56 PM       No results found for: PROTHROMBTM, INR     Lab Results   Component Value Date/Time    WBC 4.5 (L) 06/06/2021 04:56 PM    RBC 4.18 (L) 06/06/2021 04:56 PM    HEMOGLOBIN 13.1 06/06/2021 04:56 PM    HEMATOCRIT 40.7 06/06/2021 04:56 PM    MCV 97.4 06/06/2021 04:56 PM    MCH 31.3 06/06/2021 04:56 PM    MCHC 32.2 (L) 06/06/2021 04:56 PM    MPV 9.7 06/06/2021 04:56 PM    NEUTSPOLYS 73.90 (H) 06/06/2021 04:56 PM    LYMPHOCYTES 18.60 (L) 06/06/2021 04:56 PM    MONOCYTES 6.70 06/06/2021 04:56 PM    EOSINOPHILS 0.20 06/06/2021 04:56 PM    BASOPHILS 0.20  06/06/2021 04:56 PM        No results found for: HBA1C     Imaging: I personally reviewed following images, these are my reads  Xray right shoulder 06/06/2022  Moderate osteoarthritis glenohumeral and AC joints    Xray right knee 06/06/2022  Mild hip osteoarthritis    Xray hips 06/06/2022  Moderate hip osteoarthritis bilaterally    CT chest abdomen pelvis June 5, 2020  Reviewed for orthopedic issues.  There are degenerative changes in the hips bilaterally.  Degenerative changes in the lumbar spine greatest at L4-5 with decreased disc height and osteophytes noted.    IMAGING radiology reads. I reviewed the following radiology reads   Xray hips 06/06/2022    IMPRESSION:  Moderate osteoarthritis of the bilateral hip joints.      Xray right shoulder 06/06/2022  FINDINGS:     No acute fracture or dislocation.  Moderate osteoarthritis of the right glenohumeral and AC joints.     IMPRESSION:  1. No acute osseous abnormality.      Xray right knee 06/06/2022  IMPRESSION:  1. No acute osseous abnormality.                                                    Diagnosis   Visit Diagnoses     ICD-10-CM   1. Primary osteoarthritis of both hips  M16.0   2. Arthritis of shoulder region, right  M19.011   3. Primary osteoarthritis of right knee  M17.11           ASSESSMENT:  Emily Barnes 77 y.o. female seen for above     Emily was seen today for follow-up.    Diagnoses and all orders for this visit:    Primary osteoarthritis of both hips  -     Referral to Physical Therapy  -     Referral to Pain Management    Arthritis of shoulder region, right    Primary osteoarthritis of right knee  Comments:  Mild osteoarthritis         1. Discussed findings on imaging studies and reviewed increasing limitation to mobility.  She is not anxious for surgery.  Discussed that she will agree to physical therapy.  2. Discussed right hip injection.  The risks benefits and alternatives to this procedure were discussed and the patient wishes to  proceed with the procedure. Risks include but are not limited to damage to surrounding structures, infection, bleeding, worsening of pain which can be permanent, weakness which can be permanent. Benefits include pain relief, improved function. Alternatives includes not doing the procedure.    4.  Encouraged her to continue to limit her use of ibuprofen to no more than 400 mg at a time about three times a day, she is trying to take this less.  Taking tylenol no more than 1300mg twice a day.  She has tolerated this without side effects.    Follow-up: Return for Hospital injection.      Thank you very much for asking me to participate in Emily Barnes's care.  Please contact me with any questions or concerns.    Please note that this dictation was created using voice recognition software. I have made every reasonable attempt to correct obvious errors but there may be errors of grammar and content that I may have overlooked prior to finalization of this note.      Riley Phipps MD  Physical Medicine and Rehabilitation  Interventional Spine and Sports Physiatry  [facility]

## 2022-06-30 NOTE — PATIENT INSTRUCTIONS
Your procedure will be at the [facility] special procedure suite.    [facility] [place] [address]       PRE-PROCEDURE INSTRUCTIONS  You may take your regular medications except:   No Anti-inflammatories 5 days prior to your procedure. Anti-inflammatories include medicines such as  ibuprofen (Motrin, Advil), Excedrin, Naproxen (Aleve, Anaprox, Naprelan, Naprosyn), Celecoxib (Celebrex), Diclofenac (Voltaren-XR tab), and Meloxicam (Mobic).   No Glucophage or Metformin 24 hours before your procedure. You may resume next day after your procedure.  Call the physiatry office if you are taking or prescribed anti-biotics within five days of procedure.  Please ask provider if you are taking any new diabetes medication.  CONTINUE TAKING BLOOD PRESSURE MEDICATIONS AS PRESCRIBED.  Pain medications will not be prescribed on the procedure day. Procedural pain medication may be used by your provider   Call your doctor's office performing the procedure if you have a fever, chills, rash or new illness prior to your procedure    Anticoagulation/antiplatelet medications  No Blood thinning medications such as Coumadin or Plavix 5 days prior to procedure unless your doctor said to continue these medications. Call your doctor if a new medication is prescribed in this class.     Restrictions for eating before procedure:   If you are getting procedural sedation, then do not eat to for 8 hours prior to procedure appointment time. Do not drink fluids for four hours prior to your procedure time.   If you are not having procedural sedation, then Skip the meal prior to your procedure. If you have a morning procedure then skip breakfast. If you have an afternoon procedure then skip lunch.   You may drink clear liquids up to 2 hours prior to your procedure  You must have a  the day of procedure to accompany you home.      POST PROCEDURE INSTRUCTIONS   No heavy lifting, strenuous bending or strenuous exercise for 3 days  after your procedure.  No hot tubs, baths, swimming for 3 days after your procedure  You can remove the bandage the day after the procedure.  IF YOU RECEIVED A STEROID INJECTION. PLEASE NOTE THAT THERE MAY BE A DELAY FOR THE INJECTION TO START WORKING, THE DELAY MAY BE UP TO TWO WEEKS. IF YOU HAVE DIABETES, PLEASE NOTE THAT YOUR SUGAR LEVELS MAY BE ELEVATED FOR 1-2 DAYS AFTER A STEROID INJECTION.  THE STEROID MAY CAUSE TEMPORARY SYMPTOMS WHICH USUALLY RESOLVE ON THEIR OWN WITHIN 1 TO 2 DAYS INCLUDING FACIAL FLUSHING OR A FEELING OF WARMTH ON THE FACE, TEMPORARY INCREASES IN BLOOD SUGAR, INSOMNIA, INCREASED HUNGER  IF YOU RECEIVED A DIAGNOSTIC PROCEDURE (SUCH AS A MEDIAL BRANCH BLOCK), PLEASE NOTE THAT WE DO EXPECT THIS INJECTION TO WEAR OFF.  IT IS IMPORTANT TO COMPLETE THE PAIN DIARY AND LIST THE PAIN SCORE ONLY FOR THE REGION WHERE THE PROCEDURE WAS AND BRING THIS TO YOUR FOLLOW UP VISIT.  IF YOU RECEIVED A RADIOFREQUENCY PROCEDURE, THERE MAY BE SOME SORENESS AFTER THE PROCEDURE.  THIS IS NORMAL.  IF YOU EXPERIENCE PROLONGED WEAKNESS LONGER THAN ONE DAY, BOWEL OR BLADDER INCONTINENCE THEN PLEASE CALL THE PHYSIATRY OFFICE.  Your leg may feel heavy, weak and numb for up to 1-2 days. Be very careful walking.    You may resume normal activities 3 days after procedure.

## 2022-08-04 ENCOUNTER — HOSPITAL ENCOUNTER (OUTPATIENT)
Facility: REHABILITATION | Age: 77
End: 2022-08-04
Attending: PHYSICAL MEDICINE & REHABILITATION | Admitting: PHYSICAL MEDICINE & REHABILITATION
Payer: COMMERCIAL

## 2022-08-04 ENCOUNTER — APPOINTMENT (OUTPATIENT)
Dept: RADIOLOGY | Facility: REHABILITATION | Age: 77
End: 2022-08-04
Attending: PHYSICAL MEDICINE & REHABILITATION
Payer: COMMERCIAL

## 2022-08-04 VITALS
DIASTOLIC BLOOD PRESSURE: 90 MMHG | WEIGHT: 156.53 LBS | HEIGHT: 63 IN | RESPIRATION RATE: 16 BRPM | HEART RATE: 80 BPM | BODY MASS INDEX: 27.73 KG/M2 | TEMPERATURE: 97.8 F | OXYGEN SATURATION: 97 % | SYSTOLIC BLOOD PRESSURE: 175 MMHG

## 2022-08-04 DIAGNOSIS — M16.11 PRIMARY OSTEOARTHRITIS OF RIGHT HIP: ICD-10-CM

## 2022-08-04 PROCEDURE — 700101 HCHG RX REV CODE 250

## 2022-08-04 PROCEDURE — 700111 HCHG RX REV CODE 636 W/ 250 OVERRIDE (IP)

## 2022-08-04 PROCEDURE — 20610 DRAIN/INJ JOINT/BURSA W/O US: CPT

## 2022-08-04 PROCEDURE — 700117 HCHG RX CONTRAST REV CODE 255

## 2022-08-04 PROCEDURE — A9576 INJ PROHANCE MULTIPACK: HCPCS

## 2022-08-04 RX ORDER — LIDOCAINE HYDROCHLORIDE 20 MG/ML
INJECTION, SOLUTION EPIDURAL; INFILTRATION; INTRACAUDAL; PERINEURAL
Status: COMPLETED
Start: 2022-08-04 | End: 2022-08-04

## 2022-08-04 RX ORDER — DEXAMETHASONE SODIUM PHOSPHATE 10 MG/ML
INJECTION, SOLUTION INTRAMUSCULAR; INTRAVENOUS
Status: COMPLETED
Start: 2022-08-04 | End: 2022-08-04

## 2022-08-04 RX ORDER — BUPIVACAINE HYDROCHLORIDE 2.5 MG/ML
INJECTION, SOLUTION EPIDURAL; INFILTRATION; INTRACAUDAL
Status: COMPLETED
Start: 2022-08-04 | End: 2022-08-04

## 2022-08-04 RX ADMIN — DEXAMETHASONE SODIUM PHOSPHATE 10 MG: 10 INJECTION INTRAMUSCULAR; INTRAVENOUS at 10:29

## 2022-08-04 RX ADMIN — BUPIVACAINE HYDROCHLORIDE 5 ML: 2.5 INJECTION, SOLUTION EPIDURAL; INFILTRATION; INTRACAUDAL; PERINEURAL at 10:29

## 2022-08-04 RX ADMIN — LIDOCAINE HYDROCHLORIDE 5 ML: 20 INJECTION, SOLUTION EPIDURAL; INFILTRATION; INTRACAUDAL at 10:29

## 2022-08-04 RX ADMIN — GADOTERIDOL 5 ML: 279.3 INJECTION, SOLUTION INTRAVENOUS at 10:27

## 2022-08-04 ASSESSMENT — FIBROSIS 4 INDEX: FIB4 SCORE: 2.291486218791003532

## 2022-08-04 ASSESSMENT — PAIN DESCRIPTION - PAIN TYPE
TYPE: CHRONIC PAIN
TYPE: CHRONIC PAIN

## 2022-08-04 NOTE — H&P
Physical Medicine & Rehab History & Physical Note    Date  8/4/2022    Primary Care Physician  Osei CABRERA M.D.    CC  Pre-Op Diagnosis Codes:     * Primary osteoarthritis of both hips [M16.0]    HPI  This is a 77 y.o. female who presented with right hip pain.  She is here for right hip intra-articular injection.  Pain continues to be significant in the right hip.    Past Medical History:   Diagnosis Date   • Anemia 1962   • Breast cancer (HCC)    • Cancer (HCC) 2011    right breast   • Cancer (HCC) 2020    lung    • COPD (chronic obstructive pulmonary disease) (HCC)    • Cough    • Dental disorder     upper and lower dentures   • EMPHYSEMA    • High cholesterol    • Pain     occasional breast pain   • Pneumonia 1948   • Renal disorder 1997    hx of stones   • Renal disorder     stones and cyst; 4/29/20 pt reports unaware of renal cyst    • S/P radiation therapy     for breast cancer 2011 [facility]   • Small cell lung cancer (HCC)    • Snoring    • Sputum production    • Urinary incontinence        Past Surgical History:   Procedure Laterality Date   • IA BRONCHOSCOPY,DIAGNOSTIC  5/7/2020    Procedure: BRONCHOSCOPY-FIBER OPTIC WITH POSSIBLE WASH, BRUSH, BROCHOALVEOLAR LAVAGE, BIOPSY FINE NEEDLE ASPIRATION;  Surgeon: Peter Foster M.D.;  Location: [facility];  Service: Pulmonary   • ENDOBRONCHIAL US ADD-ON  5/7/2020    Procedure: ENDOBRONCHIAL ULTRASOUND (EBUS);  Surgeon: Peter Foster M.D.;  Location: [facility];  Service: Pulmonary   • COLONOSCOPY WITH BIOPSY  3/15/2012    Performed by RC FANG at [facility]   • BREAST BIOPSY  8/23/2011    Performed by MOLLY PARRA at SURGERY SAME DAY [facility] ORS   • NODE BIOPSY SENTINEL  8/8/2011    Performed by MOLLY PARRA at SURGERY SAME DAY [facility] ORS   • LUMPECTOMY  8/8/2011    Performed by MOLLY PARRA at SURGERY SAME DAY [facility] ORS   • TONSILLECTOMY  1972   • IA RADIATION THERAPY PLAN  SIMPLE         No current facility-administered medications for this encounter.       Social History     Socioeconomic History   • Marital status:      Spouse name: Not on file   • Number of children: Not on file   • Years of education: Not on file   • Highest education level: Not on file   Occupational History   • Not on file   Tobacco Use   • Smoking status: Current Every Day Smoker     Packs/day: 1.00     Years: 58.00     Pack years: 58.00     Types: Cigarettes     Start date: 1962   • Smokeless tobacco: Never Used   Vaping Use   • Vaping Use: Never used   Substance and Sexual Activity   • Alcohol use: Yes     Comment: 4 per week   • Drug use: No   • Sexual activity: Not on file   Other Topics Concern   • Not on file   Social History Narrative    ** Merged History Encounter **          Social Determinants of Health     Financial Resource Strain: Not on file   Food Insecurity: Not on file   Transportation Needs: Not on file   Physical Activity: Not on file   Stress: Not on file   Social Connections: Not on file   Intimate Partner Violence: Not on file   Housing Stability: Not on file       Family History   Problem Relation Age of Onset   • Stroke Other    • Heart Disease Other    • Cancer Mother         lung cancer   • Heart Disease Father    • Cancer Sister         lung cancer   • Cancer Sister         lung cancer   • Cancer Sister         unknown cancer       Allergies  Flu virus vaccine    Review of Systems  Negative    Physical Exam  Vitals and nursing note reviewed.   HENT:      Head: Normocephalic and atraumatic.   Eyes:      Conjunctiva/sclera: Conjunctivae normal.   Cardiovascular:      Rate and Rhythm: Normal rate and regular rhythm.      Heart sounds: Normal heart sounds.   Pulmonary:      Breath sounds: Normal breath sounds.   Neurological:      Mental Status: She is oriented to person, place, and time.   Psychiatric:         Behavior: Behavior normal.         Vital Signs                           Labs:  06/1/2022  Component Ref Range & Units 2 mo ago   (6/1/22) 1 yr ago   (6/6/21) 2 yr ago   (5/16/20) 5 yr ago   (7/27/17) 6 yr ago   (5/11/16) 6 yr ago   (5/11/16) 10 yr ago   (3/15/12)   Sodium 135 - 145 mmol/L 137  137  138  143    136    Potassium 3.6 - 5.5 mmol/L 4.0  3.6  3.8  4.7    3.6    Chloride 96 - 112 mmol/L 104  104  102  106    104    Co2 20 - 33 mmol/L 22  19 Low   20  25    25    Glucose 65 - 99 mg/dL 89  109 High   93  98    95    Bun 8 - 22 mg/dL 16  9  13  12   15  11    Creatinine 0.50 - 1.40 mg/dL 0.78  0.62  0.75  0.84  0.78   0.59    Calcium 8.4 - 10.2 mg/dL 9.4  7.9 Low  R  8.9 R  9.5    8.8    Anion Gap 7.0 - 16.0 11.0                                  Radiology:  DX-PORTABLE FLUOROSCOPY < 1 HOUR    (Results Pending)         Assessment/Plan:  Pre-Op Diagnosis Codes:     * Primary osteoarthritis of both hips [M16.0]  Procedure(s):  RIGHT hip intra-articular injection. Patient reports emesis with iodine, use non- iodine.  .     Plan to proceed with injection as planned.    Riley Phipps MD  Physical Medicine and Rehabilitation  Interventional Spine and Sports Physiatry  [facility]

## 2022-08-04 NOTE — PROGRESS NOTES
Admitting health assessment, (asthma, COPD ).  Current medications reviewed with patient, see medication reconciliation form.  Patient denies taking aspirin, NSAIDS or blood thinners.  Patient has a ride post procedure.  Printed and verbal discharge instructions given with patient understanding.

## 2022-08-04 NOTE — OP REPORT
Physician/s: Riley Phipps MD     Pre-operative Diagnosis: Right hip osteoarthritis     Post-operative Diagnosis: Right hip osteoarthritis     Procedure: Right diagnostic and therapeutic intra-articular Hip injection with fluoroscopic guidance     Description of procedure:     The risks, benefits, and alternatives of the procedure were reviewed and discussed with the patient.  Written informed consent was freely obtained. A pre-procedural time-out was conducted by the physician verifying patient’s identity, procedure to be performed, procedure site and side, and allergy verification. Appropriate equipment was determined to be in place for the procedure.      No sedation was used for this procedure.      In the procedure suite the patient was placed in a prone position with and the skin was prepped and draped in the usual sterile fashion. A 25-gauge 1.5 inch needle was used with approximately 2 mL of 1% lidocaine for local anesthesia at the junction of the femoral head and neck. A 22 gauge 5 inch was placed into skin and advanced under fluoroscopic guidance into the joint space.  Then, using Omnipaque, 1 mL of contrast was injected to demonstrate outlining joint and was no vascular update. Following negative aspiration, approx 2cc of 2% lidocaine without epinephrine, 2cc of 0.25% sensorcaine and 1cc of 10mg/cc dexamethasone was then injected into the joint, and the needle was subsequently removed.  The needle was removed leaving a trail of 1% lidocaine. The patient's hip was wiped with a 4x4 gauze, the area was cleansed with alcohol prep, and a bandaid was applied. There were no complications noted.      The patient was discharged to the recovery area in good condition.     Riley Phipps MD  Physical Medicine and Rehabilitation  Interventional Spine and Sports Physiatry  [facility]    CPT: [id number], [id number]

## 2022-08-04 NOTE — PROGRESS NOTES
1015: Hand-off rec'd from pre-procedure RN, pre-assessment completed, pt placed onto procedure table and positioned prone and hands resting on stool, blood pressure and pulse ox connected by CNA and RN, medical history reviewed (asthma, COPD, Lung CA, right breast CA), allergies reviewed (flu vaccine), no sedation given, not taking blood thinners, patient identified, time out performed and team agreed.

## 2022-08-05 ENCOUNTER — TELEPHONE (OUTPATIENT)
Dept: PHYSICAL MEDICINE AND REHAB | Facility: MEDICAL CENTER | Age: 77
End: 2022-08-05
Payer: COMMERCIAL

## 2022-08-05 NOTE — TELEPHONE ENCOUNTER
I spoke to Emily Barnes regarding special procedure Right hip intra-articular injection   that was done with Dr. Phipps on 8/4/2022 and patient reported she is doing good and she is icing the area.

## 2022-08-12 ENCOUNTER — OFFICE VISIT (OUTPATIENT)
Dept: PHYSICAL MEDICINE AND REHAB | Facility: MEDICAL CENTER | Age: 77
End: 2022-08-12
Payer: COMMERCIAL

## 2022-08-12 VITALS — WEIGHT: 158.8 LBS | BODY MASS INDEX: 28.14 KG/M2 | HEIGHT: 63 IN | TEMPERATURE: 96.9 F

## 2022-08-12 DIAGNOSIS — M16.0 PRIMARY OSTEOARTHRITIS OF BOTH HIPS: ICD-10-CM

## 2022-08-12 DIAGNOSIS — M25.511 RIGHT SHOULDER PAIN, UNSPECIFIED CHRONICITY: ICD-10-CM

## 2022-08-12 DIAGNOSIS — M19.011 ARTHRITIS OF SHOULDER REGION, RIGHT: ICD-10-CM

## 2022-08-12 PROCEDURE — 99213 OFFICE O/P EST LOW 20 MIN: CPT | Performed by: PHYSICAL MEDICINE & REHABILITATION

## 2022-08-12 ASSESSMENT — PATIENT HEALTH QUESTIONNAIRE - PHQ9: CLINICAL INTERPRETATION OF PHQ2 SCORE: 0

## 2022-08-12 ASSESSMENT — PAIN SCALES - GENERAL: PAINLEVEL: 6=MODERATE PAIN

## 2022-08-12 ASSESSMENT — FIBROSIS 4 INDEX: FIB4 SCORE: 2.291486218791003532

## 2022-08-12 NOTE — PROGRESS NOTES
Follow-up patient note    Physiatry (physical medicine and  Rehabilitation), interventional spine and sports medicine    Date of Service: 8/12/2022    Chief complaint:   Chief Complaint   Patient presents with    Follow-Up     Hip pain       HISTORY    HPI: Emily Barnes 77 y.o. female who presents today for evaluation of pain in her right shoulder and pain in her right thigh.    She has improved hip pain after hip injection on 08/04/2022.  Pain is significantly improved since the injection.    Right shoulder pain continues.  She has tried and failed PT.    Taking tylenol 1300mg twice a day, stopped taking ibuprofen.    Working a lot.         Medical records review:  I reviewed the note from the referring provider William Cruz M.D.    Previous treatments:    Physical Therapy: No    Medications the patient is tried: NSAIDs    Previous interventions: none    Previous surgeries to relieve the above pain:  none      ROS:   : urinary urgency at times  Red Flags ROS:   Fever, Chills, Sweats: Denies  Involuntary Weight Loss: Denies  Bladder Incontinence: Denies  Bowel Incontinence: Denies  Saddle Anesthesia: Denies    All other systems reviewed and negative.       PMHx:   Past Medical History:   Diagnosis Date    Anemia 1962    Breast cancer (HCC)     Cancer (HCC) 2011    right breast    Cancer (HCC) 2020    lung     COPD (chronic obstructive pulmonary disease) (HCC)     Cough     Dental disorder     upper and lower dentures    EMPHYSEMA     High cholesterol     Pain     occasional breast pain    Pneumonia 1948    Renal disorder 1997    hx of stones    Renal disorder     stones and cyst; 4/29/20 pt reports unaware of renal cyst     S/P radiation therapy     for breast cancer 2011 @ [facility]    Small cell lung cancer (HCC)     Snoring     Sputum production     Urinary incontinence        PSHx:   Past Surgical History:   Procedure Laterality Date    AL DRAIN/INJECT LARGE JOINT/BURSA Right 8/4/2022     Procedure: RIGHT hip intra-articular injection. Patient reports emesis with iodine, use non- iodine.;  Surgeon: Riley Phipps M.D.;  [facility];  Service: Pain Management    [facility] BRONCHOSCOPY,DIAGNOSTIC  5/7/2020    Procedure: BRONCHOSCOPY-FIBER OPTIC WITH POSSIBLE WASH, BRUSH, BROCHOALVEOLAR LAVAGE, BIOPSY FINE NEEDLE ASPIRATION;  Surgeon: Peter Foster M.D.;  Location: SURGERY [facility];  Service: Pulmonary    ENDOBRONCHIAL US ADD-ON  5/7/2020    Procedure: ENDOBRONCHIAL ULTRASOUND (EBUS);  Surgeon: Peter Foster M.D.;  Location: SURGERY [facility];  Service: Pulmonary    COLONOSCOPY WITH BIOPSY  3/15/2012    Performed by RC FANG at SURGERY [facility]    BREAST BIOPSY  8/23/2011    Performed by MOLLY PARRA at SURGERY SAME DAY [facility]    NODE BIOPSY SENTINEL  8/8/2011    Performed by MOLLY PARRA at SURGERY SAME DAY [facility] ORS    LUMPECTOMY  8/8/2011    Performed by MOLLY PARRA at SURGERY SAME DAY [facility] ORS    TONSILLECTOMY  1972    VA RADIATION THERAPY PLAN SIMPLE         Family history   Family History   Problem Relation Age of Onset    Stroke Other     Heart Disease Other     Cancer Mother         lung cancer    Heart Disease Father     Cancer Sister         lung cancer    Cancer Sister         lung cancer    Cancer Sister         unknown cancer         Medications:   Current Outpatient Medications   Medication    atorvastatin (LIPITOR) 10 MG Tab    cyclobenzaprine (FLEXERIL) 5 mg tablet    magnesium oxide (MAG-OX) 400 MG Tab tablet    zinc sulfate (ZINCATE) 220 (50 Zn) MG Cap    lidocaine-prilocaine (EMLA) 2.5-2.5 % Cream    Cholecalciferol (VITAMIN D) 50 MCG (2000 UT) Cap    Pseudoeph-Doxylamine-DM-APAP (NYQUIL PO)    denosumab (PROLIA) 60 MG/ML Solution Prefilled Syringe    anastrozole (ARIMIDEX) 1 MG TABS     No current facility-administered medications for this visit.       Allergies:   Allergies   Allergen Reactions     "Flu Virus Vaccine Hives    Skin Adhesives Rash     reddness       Social Hx:   Social History     Socioeconomic History    Marital status:      Spouse name: Not on file    Number of children: Not on file    Years of education: Not on file    Highest education level: Not on file   Occupational History    Not on file   Tobacco Use    Smoking status: Every Day     Packs/day: 1.00     Years: 58.00     Pack years: 58.00     Types: Cigarettes     Start date: 1962    Smokeless tobacco: Never   Vaping Use    Vaping Use: Never used   Substance and Sexual Activity    Alcohol use: Yes     Comment: 4 per week    Drug use: No    Sexual activity: Not on file   Other Topics Concern    Not on file   Social History Narrative    ** Merged History Encounter **          Social Determinants of Health     Financial Resource Strain: Not on file   Food Insecurity: Not on file   Transportation Needs: Not on file   Physical Activity: Not on file   Stress: Not on file   Social Connections: Not on file   Intimate Partner Violence: Not on file   Housing Stability: Not on file         EXAMINATION     Physical Exam:   Vitals: Temp 36.1 °C (96.9 °F) (Temporal)   Ht 1.6 m (5' 3\")   Wt 72 kg (158 lb 12.8 oz)     Constitutional:   Body Habitus: Body mass index is 28.13 kg/m².  Cooperation: Fully cooperates with exam  Appearance: Well-groomed, well-nourished, not disheveled, in no acute distress    Eyes: No scleral icterus, no proptosis     ENT -no obvious auditory deficits, wearing a face mask    Skin -no rashes or lesions noted, no warmth or erythema was noted at the injection site     Respiratory-  breathing comfortable on room air, no audible wheezing    Cardiovascular- No lower extremity edema is noted.     Psychiatric- alert and oriented ×3. Normal affect.     Gait - normal gait, no use of ambulatory device, antalgic with Trendelenburg gait    Musculoskeletal -   Cervical spine   Inspection: No deformities of the skin over the cervical " spine. No rashes or lesions.    Right shoulder with pain in abduction after about 70 degrees of abduction.  Positive Daniels test.  Positive Neer's test.  Negative empty can test bilaterally    Thoracic/Lumbar Spine/Sacral Spine/Hips   Inspection: No evidence of atrophy in bilateral lower extremities throughout      HIP  Range of motion in the hips is significantly decreased in internal and external rotation bilaterally    Neuro     No sensory deficits in the upper extremities bilaterally    Key points for the international standards for neurological classification of spinal cord injury (ISNCSCI) to light touch.     Dermatome R L   L2 2 2   L3 2 2   L4 2 2   L5 2 2   S1 2 2   S2 2 2       Motor Exam Upper Extremities  Pain limits right shoulder abduction, otherwise negative bilaterally    Motor Exam Lower Extremities    ? Myotome R L   Hip flexion L2 5 5   Knee extension L3 5 5   Ankle dorsiflexion L4 5 5   Toe extension L5 5 5   Ankle plantarflexion S1 5 5         MEDICAL DECISION MAKING    Medical records review: see under HPI section.     DATA    Labs:   Lab Results   Component Value Date/Time    SODIUM 137 06/01/2022 10:51 AM    POTASSIUM 4.0 06/01/2022 10:51 AM    CHLORIDE 104 06/01/2022 10:51 AM    CO2 22 06/01/2022 10:51 AM    ANION 11.0 06/01/2022 10:51 AM    GLUCOSE 89 06/01/2022 10:51 AM    BUN 16 06/01/2022 10:51 AM    CREATININE 0.78 06/01/2022 10:51 AM    CALCIUM 9.4 06/01/2022 10:51 AM    ASTSGOT 18 06/06/2021 04:56 PM    ALTSGPT 11 06/06/2021 04:56 PM    TBILIRUBIN 0.3 06/06/2021 04:56 PM    ALBUMIN 3.6 06/06/2021 04:56 PM    TOTPROTEIN 6.2 06/06/2021 04:56 PM    GLOBULIN 2.6 06/06/2021 04:56 PM    AGRATIO 1.4 06/06/2021 04:56 PM       No results found for: PROTHROMBTM, INR     Lab Results   Component Value Date/Time    WBC 4.5 (L) 06/06/2021 04:56 PM    RBC 4.18 (L) 06/06/2021 04:56 PM    HEMOGLOBIN 13.1 06/06/2021 04:56 PM    HEMATOCRIT 40.7 06/06/2021 04:56 PM    MCV 97.4 06/06/2021 04:56 PM    MCH  31.3 06/06/2021 04:56 PM    MCHC 32.2 (L) 06/06/2021 04:56 PM    MPV 9.7 06/06/2021 04:56 PM    NEUTSPOLYS 73.90 (H) 06/06/2021 04:56 PM    LYMPHOCYTES 18.60 (L) 06/06/2021 04:56 PM    MONOCYTES 6.70 06/06/2021 04:56 PM    EOSINOPHILS 0.20 06/06/2021 04:56 PM    BASOPHILS 0.20 06/06/2021 04:56 PM        No results found for: HBA1C     Imaging: I personally reviewed following images, these are my reads  Xray right shoulder 06/06/2022  Moderate osteoarthritis glenohumeral and AC joints    Xray right knee 06/06/2022  Mild hip osteoarthritis    Xray hips 06/06/2022  Moderate hip osteoarthritis bilaterally    CT chest abdomen pelvis June 5, 2020  Reviewed for orthopedic issues.  There are degenerative changes in the hips bilaterally.  Degenerative changes in the lumbar spine greatest at L4-5 with decreased disc height and osteophytes noted.    IMAGING radiology reads. I reviewed the following radiology reads   Xray hips 06/06/2022    IMPRESSION:  Moderate osteoarthritis of the bilateral hip joints.      Xray right shoulder 06/06/2022  FINDINGS:     No acute fracture or dislocation.  Moderate osteoarthritis of the right glenohumeral and AC joints.     IMPRESSION:  1. No acute osseous abnormality.      Xray right knee 06/06/2022  IMPRESSION:  1. No acute osseous abnormality.                                                    Diagnosis   Visit Diagnoses     ICD-10-CM   1. Arthritis of shoulder region, right  M19.011   2. Right shoulder pain, unspecified chronicity  M25.511   3. Primary osteoarthritis of both hips  M16.0             ASSESSMENT:  Emily Barnes 77 y.o. female seen for above     Emily was seen today for follow-up.    Diagnoses and all orders for this visit:    Arthritis of shoulder region, right  -     Referral to Pain Clinic    Right shoulder pain, unspecified chronicity  -     Referral to Pain Clinic    Primary osteoarthritis of both hips       She has has improvement after right hip  injection.  Continue to abstain from NSAIDs as much as possible.  Taking tylenol 1300mg up to twice a day.  Discussed she has tried and failed PT, topical creams and patches for right shoulder.  Discussed right glenohumeral joint injection with ultrasound.  The risks benefits and alternatives to this procedure were discussed and the patient wishes to proceed with the procedure. Risks include but are not limited to damage to surrounding structures, infection, bleeding, worsening of pain which can be permanent, weakness which can be permanent. Benefits include pain relief, improved function. Alternatives includes not doing the procedure.      Follow-up: Return for Office injection.      Thank you very much for asking me to participate in Emily Barnes's care.  Please contact me with any questions or concerns.    Please note that this dictation was created using voice recognition software. I have made every reasonable attempt to correct obvious errors but there may be errors of grammar and content that I may have overlooked prior to finalization of this note.      Riley Phipps MD  Physical Medicine and Rehabilitation  Interventional Spine and Sports Physiatry  [facility]

## 2022-09-02 ENCOUNTER — OFFICE VISIT (OUTPATIENT)
Dept: PHYSICAL MEDICINE AND REHAB | Facility: MEDICAL CENTER | Age: 77
End: 2022-09-02
Payer: COMMERCIAL

## 2022-09-02 ENCOUNTER — HOSPITAL ENCOUNTER (OUTPATIENT)
Dept: LAB | Facility: MEDICAL CENTER | Age: 77
End: 2022-09-02
Attending: RADIOLOGY
Payer: COMMERCIAL

## 2022-09-02 VITALS
TEMPERATURE: 97.8 F | HEART RATE: 83 BPM | WEIGHT: 156 LBS | DIASTOLIC BLOOD PRESSURE: 80 MMHG | BODY MASS INDEX: 28.71 KG/M2 | OXYGEN SATURATION: 93 % | HEIGHT: 62 IN | SYSTOLIC BLOOD PRESSURE: 134 MMHG

## 2022-09-02 DIAGNOSIS — M25.511 RIGHT SHOULDER PAIN, UNSPECIFIED CHRONICITY: ICD-10-CM

## 2022-09-02 DIAGNOSIS — C34.90 SMALL CELL LUNG CANCER (HCC): ICD-10-CM

## 2022-09-02 DIAGNOSIS — M16.0 PRIMARY OSTEOARTHRITIS OF BOTH HIPS: ICD-10-CM

## 2022-09-02 DIAGNOSIS — M19.011 ARTHRITIS OF SHOULDER REGION, RIGHT: ICD-10-CM

## 2022-09-02 LAB
ANION GAP SERPL CALC-SCNC: 11 MMOL/L (ref 7–16)
BUN SERPL-MCNC: 14 MG/DL (ref 8–22)
CALCIUM SERPL-MCNC: 9.2 MG/DL (ref 8.4–10.2)
CHLORIDE SERPL-SCNC: 106 MMOL/L (ref 96–112)
CO2 SERPL-SCNC: 25 MMOL/L (ref 20–33)
CREAT SERPL-MCNC: 0.68 MG/DL (ref 0.5–1.4)
GFR SERPLBLD CREATININE-BSD FMLA CKD-EPI: 90 ML/MIN/1.73 M 2
GLUCOSE SERPL-MCNC: 101 MG/DL (ref 65–99)
POTASSIUM SERPL-SCNC: 3.7 MMOL/L (ref 3.6–5.5)
SODIUM SERPL-SCNC: 142 MMOL/L (ref 135–145)

## 2022-09-02 PROCEDURE — 36415 COLL VENOUS BLD VENIPUNCTURE: CPT

## 2022-09-02 PROCEDURE — 99213 OFFICE O/P EST LOW 20 MIN: CPT | Mod: 25 | Performed by: PHYSICAL MEDICINE & REHABILITATION

## 2022-09-02 PROCEDURE — 20611 DRAIN/INJ JOINT/BURSA W/US: CPT | Mod: RT | Performed by: PHYSICAL MEDICINE & REHABILITATION

## 2022-09-02 PROCEDURE — 80048 BASIC METABOLIC PNL TOTAL CA: CPT

## 2022-09-02 RX ORDER — DEXAMETHASONE SODIUM PHOSPHATE 4 MG/ML
4 INJECTION, SOLUTION INTRA-ARTICULAR; INTRALESIONAL; INTRAMUSCULAR; INTRAVENOUS; SOFT TISSUE ONCE
Status: COMPLETED | OUTPATIENT
Start: 2022-09-02 | End: 2022-09-02

## 2022-09-02 RX ADMIN — DEXAMETHASONE SODIUM PHOSPHATE 4 MG: 4 INJECTION, SOLUTION INTRA-ARTICULAR; INTRALESIONAL; INTRAMUSCULAR; INTRAVENOUS; SOFT TISSUE at 11:03

## 2022-09-02 ASSESSMENT — PATIENT HEALTH QUESTIONNAIRE - PHQ9
CLINICAL INTERPRETATION OF PHQ2 SCORE: 1
5. POOR APPETITE OR OVEREATING: 0 - NOT AT ALL

## 2022-09-02 ASSESSMENT — FIBROSIS 4 INDEX: FIB4 SCORE: 2.291486218791003532

## 2022-09-02 ASSESSMENT — PAIN SCALES - GENERAL: PAINLEVEL: 8=MODERATE-SEVERE PAIN

## 2022-09-02 NOTE — PROGRESS NOTES
Follow-up patient note    Physiatry (physical medicine and  Rehabilitation), interventional spine and sports medicine    Date of Service: September 2, 2022    Chief complaint:   Chief Complaint   Patient presents with    Follow-Up     Right glenohumeral joint injection       HISTORY    HPI: Emily Barnes 77 y.o. female who presents today for evaluation of pain in her right shoulder and pain in her right thigh.    Emily reports that her right hip continues to be improved after injection on August 4, 2022.    Her right shoulder pain continues.  Pain is 8/10 on the NRS.  She did not do well with physical therapy and is discontinued this.  Taking tylenol 1300mg twice a day, prn       Medical records review:  I reviewed the note from the referring provider William Cruz M.D.    Previous treatments:    Physical Therapy: No    Medications the patient is tried: NSAIDs    Previous interventions: none    Previous surgeries to relieve the above pain:  none      ROS:   : urinary urgency at times  Red Flags ROS:   Fever, Chills, Sweats: Denies  Involuntary Weight Loss: Denies  Bladder Incontinence: Denies  Bowel Incontinence: Denies  Saddle Anesthesia: Denies    All other systems reviewed and negative.       PMHx:   Past Medical History:   Diagnosis Date    Anemia 1962    Breast cancer (HCC)     Cancer (HCC) 2011    right breast    Cancer (HCC) 2020    lung     COPD (chronic obstructive pulmonary disease) (HCC)     Cough     Dental disorder     upper and lower dentures    EMPHYSEMA     High cholesterol     Pain     occasional breast pain    Pneumonia 1948    Renal disorder 1997    hx of stones    Renal disorder     stones and cyst; 4/29/20 pt reports unaware of renal cyst     S/P radiation therapy     for breast cancer 2011 @ [facility] Dr. Mccartney    Small cell lung cancer (HCC)     Snoring     Sputum production     Urinary incontinence        PSHx:   Past Surgical History:   Procedure Laterality Date    IN DRAIN/INJECT  LARGE JOINT/BURSA Right 8/4/2022    Procedure: RIGHT hip intra-articular injection. Patient reports emesis with iodine, use non- iodine.;  Surgeon: Riley Phipps M.D.;  Location[facility];  Service: Pain Management    AK BRONCHOSCOPY,DIAGNOSTIC  5/7/2020    Procedure: BRONCHOSCOPY-FIBER OPTIC WITH POSSIBLE WASH, BRUSH, BROCHOALVEOLAR LAVAGE, BIOPSY FINE NEEDLE ASPIRATION;  Surgeon: Peter Foster M.D.;  Location: SURGERY [facility];  Service: Pulmonary    ENDOBRONCHIAL US ADD-ON  5/7/2020    Procedure: ENDOBRONCHIAL ULTRASOUND (EBUS);  Surgeon: Peter Foster M.D.;  Location: SURGERY [facility];  Service: Pulmonary    COLONOSCOPY WITH BIOPSY  3/15/2012    Performed by RC FANG at SURGERY [facility]    BREAST BIOPSY  8/23/2011    Performed by MOLLY PARRA at SURGERY SAME DAY [facility]    NODE BIOPSY SENTINEL  8/8/2011    Performed by MOLLY PARRA at SURGERY SAME DAY [facility]    LUMPECTOMY  8/8/2011    Performed by MOLLY PARRA at SURGERY [facility] DAY [facility]    TONSILLECTOMY  1972    AK RADIATION THERAPY PLAN SIMPLE         Family history   Family History   Problem Relation Age of Onset    Stroke Other     Heart Disease Other     Cancer Mother         lung cancer    Heart Disease Father     Cancer Sister         lung cancer    Cancer Sister         lung cancer    Cancer Sister         unknown cancer         Medications:   Current Outpatient Medications   Medication    atorvastatin (LIPITOR) 10 MG Tab    cyclobenzaprine (FLEXERIL) 5 mg tablet    magnesium oxide (MAG-OX) 400 MG Tab tablet    zinc sulfate (ZINCATE) 220 (50 Zn) MG Cap    lidocaine-prilocaine (EMLA) 2.5-2.5 % Cream    Cholecalciferol (VITAMIN D) 50 MCG (2000 UT) Cap    Pseudoeph-Doxylamine-DM-APAP (NYQUIL PO)    denosumab (PROLIA) 60 MG/ML Solution Prefilled Syringe    anastrozole (ARIMIDEX) 1 MG TABS     No current facility-administered medications for this visit.       Allergies:  "  Allergies   Allergen Reactions    Flu Virus Vaccine Hives    Skin Adhesives Rash     reddness       Social Hx:   Social History     Socioeconomic History    Marital status:      Spouse name: Not on file    Number of children: Not on file    Years of education: Not on file    Highest education level: Not on file   Occupational History    Not on file   Tobacco Use    Smoking status: Every Day     Packs/day: 1.00     Years: 58.00     Pack years: 58.00     Types: Cigarettes     Start date: 1962    Smokeless tobacco: Never   Vaping Use    Vaping Use: Never used   Substance and Sexual Activity    Alcohol use: Yes     Comment: 4 per week    Drug use: No    Sexual activity: Not on file   Other Topics Concern    Not on file   Social History Narrative    ** Merged History Encounter **          Social Determinants of Health     Financial Resource Strain: Not on file   Food Insecurity: Not on file   Transportation Needs: Not on file   Physical Activity: Not on file   Stress: Not on file   Social Connections: Not on file   Intimate Partner Violence: Not on file   Housing Stability: Not on file         EXAMINATION     Physical Exam:   Vitals: /80 (BP Location: Left arm, Patient Position: Sitting, BP Cuff Size: Adult long)   Pulse 83   Temp 36.6 °C (97.8 °F) (Temporal)   Ht 1.575 m (5' 2\")   Wt 70.8 kg (156 lb)   SpO2 93%     Constitutional:   Body Habitus: Body mass index is 28.53 kg/m².  Cooperation: Fully cooperates with exam  Appearance: Well-groomed, well-nourished, not disheveled, in no acute distress    Eyes: No scleral icterus, no proptosis     ENT -no obvious auditory deficits, wearing a face mask    Skin -no rashes or lesions noted    Respiratory-  breathing comfortable on room air, no audible wheezing    Cardiovascular- No lower extremity edema is noted.     Psychiatric- alert and oriented ×3. Normal affect.     Gait - normal gait, no use of ambulatory device, mild Trendelenburg gait      MEDICAL " DECISION MAKING    Medical records review: see under HPI section.     DATA    Labs:   Lab Results   Component Value Date/Time    SODIUM 142 09/02/2022 11:04 AM    POTASSIUM 3.7 09/02/2022 11:04 AM    CHLORIDE 106 09/02/2022 11:04 AM    CO2 25 09/02/2022 11:04 AM    ANION 11.0 09/02/2022 11:04 AM    GLUCOSE 101 (H) 09/02/2022 11:04 AM    BUN 14 09/02/2022 11:04 AM    CREATININE 0.68 09/02/2022 11:04 AM    CALCIUM 9.2 09/02/2022 11:04 AM    ASTSGOT 18 06/06/2021 04:56 PM    ALTSGPT 11 06/06/2021 04:56 PM    TBILIRUBIN 0.3 06/06/2021 04:56 PM    ALBUMIN 3.6 06/06/2021 04:56 PM    TOTPROTEIN 6.2 06/06/2021 04:56 PM    GLOBULIN 2.6 06/06/2021 04:56 PM    AGRATIO 1.4 06/06/2021 04:56 PM       No results found for: PROTHROMBTM, INR     Lab Results   Component Value Date/Time    WBC 4.5 (L) 06/06/2021 04:56 PM    RBC 4.18 (L) 06/06/2021 04:56 PM    HEMOGLOBIN 13.1 06/06/2021 04:56 PM    HEMATOCRIT 40.7 06/06/2021 04:56 PM    MCV 97.4 06/06/2021 04:56 PM    MCH 31.3 06/06/2021 04:56 PM    MCHC 32.2 (L) 06/06/2021 04:56 PM    MPV 9.7 06/06/2021 04:56 PM    NEUTSPOLYS 73.90 (H) 06/06/2021 04:56 PM    LYMPHOCYTES 18.60 (L) 06/06/2021 04:56 PM    MONOCYTES 6.70 06/06/2021 04:56 PM    EOSINOPHILS 0.20 06/06/2021 04:56 PM    BASOPHILS 0.20 06/06/2021 04:56 PM        No results found for: HBA1C     Imaging: I personally reviewed following images, these are my reads  Xray right shoulder 06/06/2022  Moderate osteoarthritis glenohumeral and AC joints    Xray right knee 06/06/2022  Mild hip osteoarthritis    Xray hips 06/06/2022  Moderate hip osteoarthritis bilaterally    CT chest abdomen pelvis June 5, 2020  Reviewed for orthopedic issues.  There are degenerative changes in the hips bilaterally.  Degenerative changes in the lumbar spine greatest at L4-5 with decreased disc height and osteophytes noted.    IMAGING radiology reads. I reviewed the following radiology reads   Xray hips 06/06/2022    IMPRESSION:  Moderate osteoarthritis of  the bilateral hip joints.      Xray right shoulder 06/06/2022  FINDINGS:     No acute fracture or dislocation.  Moderate osteoarthritis of the right glenohumeral and AC joints.     IMPRESSION:  1. No acute osseous abnormality.      Xray right knee 06/06/2022  IMPRESSION:  1. No acute osseous abnormality.                                                    Diagnosis   Visit Diagnoses     ICD-10-CM   1. Arthritis of shoulder region, right  M19.011   2. Right shoulder pain, unspecified chronicity  M25.511   3. Primary osteoarthritis of both hips  M16.0             ASSESSMENT:  Emily Barnes 77 y.o. female seen for above     Emily was seen today for follow-up.    Diagnoses and all orders for this visit:    Arthritis of shoulder region, right  -     dexamethasone (DECADRON) injection 4 mg    Right shoulder pain, unspecified chronicity  -     dexamethasone (DECADRON) injection 4 mg    Primary osteoarthritis of both hips       She has has improvement after right hip injection, pain continues to be improved.  Discussed right glenohumeral joint injection with ultrasound.  The risks benefits and alternatives to this procedure were discussed and the patient wishes to proceed with the procedure. Risks include but are not limited to damage to surrounding structures, infection, bleeding, worsening of pain which can be permanent, weakness which can be permanent. Benefits include pain relief, improved function. Alternatives includes not doing the procedure.  See procedure note for details of today's procedure    Follow-up: Return in about 3 weeks (around 9/23/2022), or if symptoms worsen or fail to improve.      Thank you very much for asking me to participate in Emily Barnes's care.  Please contact me with any questions or concerns.    Please note that this dictation was created using voice recognition software. I have made every reasonable attempt to correct obvious errors but there may be errors of grammar and  content that I may have overlooked prior to finalization of this note.      Riley Phipps MD  Physical Medicine and Rehabilitation  Interventional Spine and Sports Physiatry  [facility]

## 2022-09-02 NOTE — PROCEDURES
Date of Service: 9/2/2022    Physician/s: Riley Phipps MD    Pre-operative Diagnosis:   M19.011 (ICD-10-CM) - Arthritis of shoulder region, right   M25.511 (ICD-10-CM) - Right shoulder pain, unspecified chronicity       Post-operative Diagnosis:   M19.011 (ICD-10-CM) - Arthritis of shoulder region, right   M25.511 (ICD-10-CM) - Right shoulder pain, unspecified chronicity       Procedure: right glenohumeral joint injection ultrasound-guided     Description of procedure:    The risks, benefits, and alternatives of the procedure were reviewed and discussed with the patient.  Written informed consent was freely obtained. A pre-procedural time-out was conducted by the physician verifying patient’s identity, procedure to be performed, procedure site and side, and allergy verification. Appropriate equipment was determined to be in place for the procedure.      No sedation was used for this procedure.     In the office suite the patient was placed in seated position with her right shoulder exposed. The ultrasound probe was placed over the  lateral aspect of the head of the humerus, and the rotator cuff and humeral head were identified. The skin was prepped and draped in the usual sterile fashion. A 25g 3.5 inch needle was placed into skin via a advanced under ultrasound guidance, out of plane approach, into the glenohumeral capsule. Following negative aspiration, approx 5mL of 1% lidocaine and 1mL 4mg/mL of dexamethasone was then injected, and the needle was subsequently removed intact. The patient's shoulder was wiped with a 4x4 gauze, the area was cleansed with alcohol prep, and a bandaid was applied. There were no complications noted.     Patient noted improvement in her symptoms prior to discharge.    Riley Phipps MD

## 2022-09-05 ENCOUNTER — HOSPITAL ENCOUNTER (OUTPATIENT)
Dept: RADIOLOGY | Facility: MEDICAL CENTER | Age: 77
End: 2022-09-05
Attending: RADIOLOGY
Payer: COMMERCIAL

## 2022-09-05 DIAGNOSIS — C34.90 SMALL CELL LUNG CANCER (HCC): ICD-10-CM

## 2022-09-05 PROCEDURE — 700117 HCHG RX CONTRAST REV CODE 255: Performed by: RADIOLOGY

## 2022-09-05 PROCEDURE — 71260 CT THORAX DX C+: CPT

## 2022-09-05 RX ADMIN — IOHEXOL 100 ML: 350 INJECTION, SOLUTION INTRAVENOUS at 12:23

## 2022-09-12 ENCOUNTER — HOSPITAL ENCOUNTER (OUTPATIENT)
Dept: RADIATION ONCOLOGY | Facility: MEDICAL CENTER | Age: 77
End: 2022-09-30
Attending: RADIOLOGY
Payer: COMMERCIAL

## 2022-09-12 VITALS
TEMPERATURE: 97.2 F | DIASTOLIC BLOOD PRESSURE: 88 MMHG | SYSTOLIC BLOOD PRESSURE: 155 MMHG | HEART RATE: 86 BPM | WEIGHT: 157.63 LBS | BODY MASS INDEX: 28.83 KG/M2 | OXYGEN SATURATION: 95 %

## 2022-09-12 DIAGNOSIS — C34.90 SMALL CELL LUNG CANCER (HCC): ICD-10-CM

## 2022-09-12 PROCEDURE — 99214 OFFICE O/P EST MOD 30 MIN: CPT | Performed by: RADIOLOGY

## 2022-09-12 PROCEDURE — 99212 OFFICE O/P EST SF 10 MIN: CPT | Performed by: RADIOLOGY

## 2022-09-12 ASSESSMENT — FIBROSIS 4 INDEX: FIB4 SCORE: 2.291486218791003532

## 2022-09-12 ASSESSMENT — PAIN SCALES - GENERAL: PAINLEVEL: NO PAIN

## 2022-09-12 NOTE — PROGRESS NOTES
RADIATION ONCOLOGY FOLLOW-UP    Patient name:  Emily Barnes    Primary Physician:  Osei CABRERA M.D. MRN: [id number]  CSN: [id number]   Referring physician:  No ref. provider found  : 1945, 77 y.o.     DATE OF SERVICE: 2022    IDENTIFICATION:   A 77 y.o. female with    Small cell lung cancer (HCC)  Staging form: Lung, AJCC 8th Edition  - Clinical stage from 2020: Stage IIB (cT1c, cN1, cM0) - Signed by William Cruz M.D. on 2020  Type of lung cancer: Small cell lung cancer      RADIATION SUMMARY:  [facility] Treatment Information          Some values may be hidden. Unless noted otherwise, only the newest values recorded on each date are displayed.           TREATMENT SUMMARY:    Course: C2_SCLC    Treatment Site Ref. ID Energy Dose/Fx (cGy) #Fx Dose Correction (cGy) Total Dose (cGy) Start Date End Date Elapsed Days   R_Lung_Nodes R_Lung_Nodes 6X 150  / 30 0 4,500 2020 18     Course: MOSAIQ    Treatment Site Ref. ID Energy Dose/Fx (cGy) #Fx Dose Correction (cGy) Total Dose (cGy) Start Date End Date Elapsed Days   R Breast Boost R Breast Boost 15X/6X 200 5 / 5 0 1,000 10/24/2011 10/31/2011 7   R Breast R Breast 6X 266 16 / 16 0 4,256 2011 10/21/2011 22            HISTORY OF PRESENT ILLNESS:  Patient originally presented with abnormal CT scan finding in 2018 from her breast cancer surveillance.  It was grossly stable until 2019 which showed slight increase in size of right hilar bunny tissue now measuring 2.4 x 1.5 cm with no evidence of primary small cell lung cancer.  Patient had her screening mammogram 2019 which showed no evidence of latency.  Patient was seen by Dr. Crain in pulmonary who ordered a PET CT scan in 2020 which showed FDG avid right hilar lymph node concerning for metastatic disease.  Patient underwent NIKOLAS guided biopsy by Dr. Foster with 11 R and 10 R sampled showing small cell lung cancer TTF-1 positive  synaptophysin positive and chromogranin positive with KATHERIN-3 negative.  MRI brain was performed today May 18, 2020 and per my read shows no evidence of brain metastasis.  She is currently asymptomatic without any pain and is not on oxygen she denies any shortness of breath but does have a clear cough with no hemoptysis.     11/6/20  Called patient to order restaging MR brain and CT chest with contrast. Completed chemoradiation for limited stage small cell lung cancer, but declined further chemo or PCI to brain.     1/8/21  Patient doing well with increased energy and appetite. CT chest and MRI brain shows shrinkage of right hilar mass small 3 mm left upper lobe nodule nonspecific and no evidence of brain metastasis.  She denies any cough, shortness of breath or hemoptysis or weight loss.     4/12/21  Patient is doing well.  She has no evidence of CNS relapse in her CT chest today shows 2.5 mm right upper lobe nodule unchanged and right hilar lymph node measuring 5.6 x 8.8 mm previously 10 x 15 mm.  No other evidence of disease.  She is doing well overall she did have some nausea with the contrast administration.  She denies any weight loss or hemoptysis     10/14/21  Patient is doing well had MRI brain October 2021 which showed no evidence of metastasis.  I have ordered a repeat CT chest abdomen pelvis scan today to evaluate the rest of her body.  Patient denies any cough shortness of breath or weight loss.     5/2021  Patient doing well had MRI brain which showed no evidence of disease recurrence.  She has some joint pain especially in the right shoulder and right knee.  She did have bone scan which showed no evidence of bone metastasis but significant arthritis.        06/03/22  Patient is doing well she did have restaging CT chest abdomen pelvis scan June 1, 2022 which showed overall stability however new 2.5 cm groundglass opacity of the complex of the right major and minor fissure while likely infectious or  inflammatory should be closely followed.  No new lesions or lymphadenopathy in chest and pelvis.  Overall patient feeling well she still has arthritis that is seeing a physiatrist for this      INTERVAL HISTORY:  Patient doing well had repeat CT chest abdomen pelvis September 2020 which shows no evidence of disease recurrence.  She has MRI brain ordered for November 9, 2022.  She denies any headaches, nausea, vomiting or other neurologic symptoms.  She denies any cough shortness of breath or hemoptysis or weight loss.      PROBLEM LIST:  Patient Active Problem List   Diagnosis    Pulmonary nodule/R hilar mass    Breast cancer     Small cell lung cancer (HCC)    COPD with asthma (HCC)    Risk for falls       CURRENT MEDICATIONS:  Current Outpatient Medications   Medication Sig Dispense Refill    cyclobenzaprine (FLEXERIL) 5 mg tablet Take 1 Tab by mouth 3 times a day as needed. 30 Tab 0    magnesium oxide (MAG-OX) 400 MG Tab tablet Take 400 mg by mouth.      zinc sulfate (ZINCATE) 220 (50 Zn) MG Cap Take 220 mg by mouth every day.      lidocaine-prilocaine (EMLA) 2.5-2.5 % Cream APPLY A SMALL AMOUNT OF CREAM TO PORT SITE AN HOUR PRIOR TO TREATMENT      Cholecalciferol (VITAMIN D) 50 MCG (2000 UT) Cap Take 4,000 Units by mouth every day.      Pseudoeph-Doxylamine-DM-APAP (NYQUIL PO) Take  by mouth as needed. Takes prn for sleep approx twice a month      atorvastatin (LIPITOR) 10 MG Tab Take 10 mg by mouth every day. Takes in the afternoon      denosumab (PROLIA) 60 MG/ML Solution Prefilled Syringe Inject 60 mg under the skin one time. Twice a year      anastrozole (ARIMIDEX) 1 MG TABS Take 1 mg by mouth every day. Takes in the afternoon       No current facility-administered medications for this encounter.       ALLERGIES:  Flu virus vaccine and Skin adhesives    REVIEW OF SYSTEMS:    A complete review of system taken. Pertinent items in HPI.  All others negative.    PHYSICAL EXAM:  PERFORMANCE STATUS: 1  BP (!)  155/88   Pulse 86   Temp 36.2 °C (97.2 °F)   Wt 71.5 kg (157 lb 10.1 oz)   SpO2 95%   BMI 28.83 kg/m²   Physical Exam  Vitals reviewed.   HENT:      Head: Normocephalic.   Eyes:      Pupils: Pupils are equal, round, and reactive to light.   Cardiovascular:      Rate and Rhythm: Normal rate.   Pulmonary:      Effort: Pulmonary effort is normal.   Abdominal:      General: Abdomen is flat.   Musculoskeletal:         General: Normal range of motion.   Neurological:      General: No focal deficit present.      Mental Status: She is alert.   Psychiatric:         Mood and Affect: Mood normal.       LABORATORY DATA:   Lab Results   Component Value Date/Time    WBC 4.5 (L) 06/06/2021 04:56 PM    RBC 4.18 (L) 06/06/2021 04:56 PM    HEMOGLOBIN 13.1 06/06/2021 04:56 PM    HEMATOCRIT 40.7 06/06/2021 04:56 PM    MCV 97.4 06/06/2021 04:56 PM    MCH 31.3 06/06/2021 04:56 PM    MCHC 32.2 (L) 06/06/2021 04:56 PM    RDW 50.7 (H) 06/06/2021 04:56 PM    PLATELETCT 180 06/06/2021 04:56 PM    MPV 9.7 06/06/2021 04:56 PM    NEUTSPOLYS 73.90 (H) 06/06/2021 04:56 PM    LYMPHOCYTES 18.60 (L) 06/06/2021 04:56 PM    MONOCYTES 6.70 06/06/2021 04:56 PM    EOSINOPHILS 0.20 06/06/2021 04:56 PM    BASOPHILS 0.20 06/06/2021 04:56 PM      Lab Results   Component Value Date/Time    SODIUM 142 09/02/2022 11:04 AM    POTASSIUM 3.7 09/02/2022 11:04 AM    CHLORIDE 106 09/02/2022 11:04 AM    CO2 25 09/02/2022 11:04 AM    GLUCOSE 101 (H) 09/02/2022 11:04 AM    BUN 14 09/02/2022 11:04 AM    CREATININE 0.68 09/02/2022 11:04 AM         RADIOLOGY DATA:  CT-CHEST,ABDOMEN,PELVIS WITH    Result Date: 9/5/2022  1.  Resolution of groundglass opacity anterior to major fissure near the outer pleural surface since the prior CT. 2.  Emphysema again noted. 3.  Nodular thickening left adrenal gland appears unchanged. 4.  No CT evidence of metastatic disease in the chest abdomen or pelvis. 5.  No change in 3.9 cm aneurysm of infrarenal abdominal aorta.    DX-PORTABLE  FLUOROSCOPY < 1 HOUR    Result Date: 8/4/2022  Portable fluoroscopy utilized for 18.2 seconds. INTERPRETING LOCATION: [address] [place] [address], [id number]      IMPRESSION:    A 77 y.o. with   Small cell lung cancer (HCC)  Staging form: Lung, AJCC 8th Edition  - Clinical stage from 5/18/2020: Stage IIB (cT1c, cN1, cM0) - Signed by William Cruz M.D. on 5/18/2020  Type of lung cancer: Small cell lung cancer      CANCER STATUS:  No Evidence of Disease    RECOMMENDATIONS:   Patient doing well I recommend repeat MRI surveillance in November and CT chest abdomen pelvis scan and 6 months in March 2023. Patient seeing Dr. Alcantar as well.     Thank you for the opportunity to participate in her care.  If any questions or comments, please do not hesitate in calling.    Orders Placed This Encounter    CT-CHEST,ABDOMEN,PELVIS WITH

## 2022-09-12 NOTE — PROGRESS NOTES
Patient was seen today in clinic with Dr. Cruz for follow up.  Vitals signs and weight were obtained and pain assessment was completed.  Allergies and medications were reviewed with the patient.  Toxicities of treatment assessed.     Vitals/Pain:  Vitals:    09/12/22 1030   BP: (!) 155/88   Pulse: 86   Temp: 36.2 °C (97.2 °F)   SpO2: 95%   Weight: 71.5 kg (157 lb 10.1 oz)   Pain Score: (P) No pain        Allergies:   Flu virus vaccine and Skin adhesives    Current Medications:  Current Outpatient Medications   Medication Sig Dispense Refill    cyclobenzaprine (FLEXERIL) 5 mg tablet Take 1 Tab by mouth 3 times a day as needed. 30 Tab 0    magnesium oxide (MAG-OX) 400 MG Tab tablet Take 400 mg by mouth.      zinc sulfate (ZINCATE) 220 (50 Zn) MG Cap Take 220 mg by mouth every day.      lidocaine-prilocaine (EMLA) 2.5-2.5 % Cream APPLY A SMALL AMOUNT OF CREAM TO PORT SITE AN HOUR PRIOR TO TREATMENT      Cholecalciferol (VITAMIN D) 50 MCG (2000 UT) Cap Take 4,000 Units by mouth every day.      Pseudoeph-Doxylamine-DM-APAP (NYQUIL PO) Take  by mouth as needed. Takes prn for sleep approx twice a month      atorvastatin (LIPITOR) 10 MG Tab Take 10 mg by mouth every day. Takes in the afternoon      denosumab (PROLIA) 60 MG/ML Solution Prefilled Syringe Inject 60 mg under the skin one time. Twice a year      anastrozole (ARIMIDEX) 1 MG TABS Take 1 mg by mouth every day. Takes in the afternoon       No current facility-administered medications for this encounter.           Pernell Urban Ass't Patient was seen today in clinic with Dr. Cruz for follow up.  Vitals signs and weight were obtained and pain assessment was completed.  Allergies and medications were reviewed with the patient.  Toxicities of treatment assessed.     Vitals/Pain:  Vitals:    09/12/22 1030   BP: (!) 155/88   Pulse: 86   Temp: 36.2 °C (97.2 °F)   SpO2: 95%   Weight: 71.5 kg (157 lb 10.1 oz)   Pain Score: (P) No pain        Allergies:   Flu virus  vaccine and Skin adhesives    Current Medications:  Current Outpatient Medications   Medication Sig Dispense Refill    cyclobenzaprine (FLEXERIL) 5 mg tablet Take 1 Tab by mouth 3 times a day as needed. 30 Tab 0    magnesium oxide (MAG-OX) 400 MG Tab tablet Take 400 mg by mouth.      zinc sulfate (ZINCATE) 220 (50 Zn) MG Cap Take 220 mg by mouth every day.      lidocaine-prilocaine (EMLA) 2.5-2.5 % Cream APPLY A SMALL AMOUNT OF CREAM TO PORT SITE AN HOUR PRIOR TO TREATMENT      Cholecalciferol (VITAMIN D) 50 MCG (2000 UT) Cap Take 4,000 Units by mouth every day.      Pseudoeph-Doxylamine-DM-APAP (NYQUIL PO) Take  by mouth as needed. Takes prn for sleep approx twice a month      atorvastatin (LIPITOR) 10 MG Tab Take 10 mg by mouth every day. Takes in the afternoon      denosumab (PROLIA) 60 MG/ML Solution Prefilled Syringe Inject 60 mg under the skin one time. Twice a year      anastrozole (ARIMIDEX) 1 MG TABS Take 1 mg by mouth every day. Takes in the afternoon       No current facility-administered medications for this encounter.           Daiana Can, Med Ass't

## 2022-09-14 ENCOUNTER — OFFICE VISIT (OUTPATIENT)
Dept: PHYSICAL MEDICINE AND REHAB | Facility: MEDICAL CENTER | Age: 77
End: 2022-09-14
Payer: COMMERCIAL

## 2022-09-14 VITALS
SYSTOLIC BLOOD PRESSURE: 132 MMHG | WEIGHT: 158 LBS | DIASTOLIC BLOOD PRESSURE: 62 MMHG | HEART RATE: 93 BPM | HEIGHT: 62 IN | OXYGEN SATURATION: 93 % | TEMPERATURE: 97.5 F | BODY MASS INDEX: 29.08 KG/M2

## 2022-09-14 DIAGNOSIS — M16.0 PRIMARY OSTEOARTHRITIS OF BOTH HIPS: ICD-10-CM

## 2022-09-14 DIAGNOSIS — M19.011 ARTHRITIS OF SHOULDER REGION, RIGHT: ICD-10-CM

## 2022-09-14 PROCEDURE — 99214 OFFICE O/P EST MOD 30 MIN: CPT | Performed by: PHYSICAL MEDICINE & REHABILITATION

## 2022-09-14 RX ORDER — TRAMADOL HYDROCHLORIDE 50 MG/1
50 TABLET ORAL EVERY 6 HOURS PRN
Qty: 28 TABLET | Refills: 0 | Status: SHIPPED | OUTPATIENT
Start: 2022-09-14 | End: 2022-09-21

## 2022-09-14 ASSESSMENT — FIBROSIS 4 INDEX: FIB4 SCORE: 2.291486218791003532

## 2022-09-14 ASSESSMENT — PATIENT HEALTH QUESTIONNAIRE - PHQ9: CLINICAL INTERPRETATION OF PHQ2 SCORE: 0

## 2022-09-14 ASSESSMENT — PAIN SCALES - GENERAL: PAINLEVEL: 6=MODERATE PAIN

## 2022-09-14 NOTE — PROGRESS NOTES
Follow-up patient note    Physiatry (physical medicine and  Rehabilitation), interventional spine and sports medicine    Date of Service: September 14, 2022    Chief complaint:   Chief Complaint   Patient presents with    Follow-Up     Arm pain         HISTORY    HPI: Emily Barnes 77 y.o. female who presents today for evaluation of pain in her right shoulder and pain in her right hip.    Emily returns after right shoulder injection, right glenohumeral joint injection on September 2, 2022.  She did notice improvement in the shoulder pain prior to discharge but this seemed to return quickly and is back at her baseline.  Pain does limit some activities of daily living and has not responded to topical treatments or anti-inflammatories.  She does take Tylenol, no more than 2600 mg a day.  Regarding the shoulder she is not interested in physical therapy as she fears it may worsen symptoms yet.  Similarly she is not interested in surgery    Her right hip pain continues to be improved after injection on August 4, 2022.      Medical records review:  I reviewed the note from the referring provider William Cruz M.D.    Previous treatments:    Physical Therapy: No    Medications the patient is tried: NSAIDs    Previous interventions: none    Previous surgeries to relieve the above pain:  none      ROS:   : urinary urgency at times  Red Flags ROS:   Fever, Chills, Sweats: Denies  Involuntary Weight Loss: Denies  Bladder Incontinence: Denies  Bowel Incontinence: Denies  Saddle Anesthesia: Denies    All other systems reviewed and negative.       PMHx:   Past Medical History:   Diagnosis Date    Anemia 1962    Breast cancer (HCC)     Cancer (HCC) 2011    right breast    Cancer (HCC) 2020    lung     COPD (chronic obstructive pulmonary disease) (HCC)     Cough     Dental disorder     upper and lower dentures    EMPHYSEMA     High cholesterol     Pain     occasional breast pain    Pneumonia 1948    Renal disorder  1997    hx of stones    Renal disorder     stones and cyst; 4/29/20 pt reports unaware of renal cyst     S/P radiation therapy     for breast cancer 2011 @ [facility] Dr. Mccartney    Small cell lung cancer (HCC)     Snoring     Sputum production     Urinary incontinence        PSHx:   Past Surgical History:   Procedure Laterality Date    NM DRAIN/INJECT LARGE JOINT/BURSA Right 8/4/2022    Procedure: RIGHT hip intra-articular injection. Patient reports emesis with iodine, use non- iodine.;  Surgeon: Riley Phipps M.D.;  [facility];  Service: Pain Management    NM BRONCHOSCOPY,DIAGNOSTIC  5/7/2020    Procedure: BRONCHOSCOPY-FIBER OPTIC WITH POSSIBLE WASH, BRUSH, BROCHOALVEOLAR LAVAGE, BIOPSY FINE NEEDLE ASPIRATION;  Surgeon: Peter Foster M.D.;  Location: [facility];  Service: Pulmonary    ENDOBRONCHIAL US ADD-ON  5/7/2020    Procedure: ENDOBRONCHIAL ULTRASOUND (EBUS);  Surgeon: Peter Foster M.D.;  Location: [facility];  Service: Pulmonary    COLONOSCOPY WITH BIOPSY  3/15/2012    Performed by RC FANG at [facility]    BREAST BIOPSY  8/23/2011    Performed by MOLLY PARRA at SURGERY [facility] DAY [facility]    NODE BIOPSY SENTINEL  8/8/2011    Performed by MOLLY PARRA at [facility]ERY [facility] DAY [facility]    LUMPECTOMY  8/8/2011    Performed by MOLLY PARRA at SURGERY [facility] DAY [facility]    TONSILLECTOMY  1972    NM RADIATION THERAPY PLAN SIMPLE         Family history   Family History   Problem Relation Age of Onset    Stroke Other     Heart Disease Other     Cancer Mother         lung cancer    Heart Disease Father     Cancer Sister         lung cancer    Cancer Sister         lung cancer    Cancer Sister         unknown cancer         Medications:   Current Outpatient Medications   Medication    traMADol (ULTRAM) 50 MG Tab    cyclobenzaprine (FLEXERIL) 5 mg tablet    magnesium oxide (MAG-OX) 400 MG Tab tablet    zinc sulfate  "(ZINCATE) 220 (50 Zn) MG Cap    lidocaine-prilocaine (EMLA) 2.5-2.5 % Cream    Cholecalciferol (VITAMIN D) 50 MCG (2000 UT) Cap    Pseudoeph-Doxylamine-DM-APAP (NYQUIL PO)    atorvastatin (LIPITOR) 10 MG Tab    denosumab (PROLIA) 60 MG/ML Solution Prefilled Syringe    anastrozole (ARIMIDEX) 1 MG TABS     No current facility-administered medications for this visit.       Allergies:   Allergies   Allergen Reactions    Flu Virus Vaccine Hives    Skin Adhesives Rash     reddness       Social Hx:   Social History     Socioeconomic History    Marital status:      Spouse name: Not on file    Number of children: Not on file    Years of education: Not on file    Highest education level: Not on file   Occupational History    Not on file   Tobacco Use    Smoking status: Every Day     Packs/day: 2.00     Years: 58.00     Pack years: 116.00     Types: Cigarettes     Start date: 1962    Smokeless tobacco: Never   Vaping Use    Vaping Use: Never used   Substance and Sexual Activity    Alcohol use: Yes     Comment: 4 per week    Drug use: No    Sexual activity: Not on file   Other Topics Concern    Not on file   Social History Narrative    ** Merged History Encounter **          Social Determinants of Health     Financial Resource Strain: Not on file   Food Insecurity: Not on file   Transportation Needs: Not on file   Physical Activity: Not on file   Stress: Not on file   Social Connections: Not on file   Intimate Partner Violence: Not on file   Housing Stability: Not on file         EXAMINATION     Physical Exam:   Vitals: /62 (BP Location: Left arm, Patient Position: Sitting, BP Cuff Size: Adult long)   Pulse 93   Temp 36.4 °C (97.5 °F) (Temporal)   Ht 1.575 m (5' 2\")   Wt 71.7 kg (158 lb)   SpO2 93%     Constitutional:   Body Habitus: Body mass index is 28.9 kg/m².  Cooperation: Fully cooperates with exam  Appearance: Well-groomed, well-nourished, not disheveled, in no acute distress    Eyes: No scleral " icterus, no proptosis     ENT -no obvious auditory deficits, wearing a face mask    Skin -no rashes or lesions noted    Respiratory-  breathing comfortable on room air, no audible wheezing    Cardiovascular- No lower extremity edema is noted.     Psychiatric- alert and oriented ×3. Normal affect.     Gait - normal gait, no use of ambulatory device, mild Trendelenburg gait    Musculoskeletal  Right shoulder pain with range of motion  Daniels test is equivocal on the right Neer's test is positive for pain  Empty can test positive on the right and negative on the left  Manual muscle testing reveals no focal motor deficits in the upper extremities bilaterally  Sensory is intact to light touch in the upper extremities bilaterally  Tinel's is negative at the wrists bilaterally  Reflexes are 2+ biceps and triceps bilaterally      MEDICAL DECISION MAKING    Medical records review: see under HPI section.     DATA    Labs:   Lab Results   Component Value Date/Time    SODIUM 142 09/02/2022 11:04 AM    POTASSIUM 3.7 09/02/2022 11:04 AM    CHLORIDE 106 09/02/2022 11:04 AM    CO2 25 09/02/2022 11:04 AM    ANION 11.0 09/02/2022 11:04 AM    GLUCOSE 101 (H) 09/02/2022 11:04 AM    BUN 14 09/02/2022 11:04 AM    CREATININE 0.68 09/02/2022 11:04 AM    CALCIUM 9.2 09/02/2022 11:04 AM    ASTSGOT 18 06/06/2021 04:56 PM    ALTSGPT 11 06/06/2021 04:56 PM    TBILIRUBIN 0.3 06/06/2021 04:56 PM    ALBUMIN 3.6 06/06/2021 04:56 PM    TOTPROTEIN 6.2 06/06/2021 04:56 PM    GLOBULIN 2.6 06/06/2021 04:56 PM    AGRATIO 1.4 06/06/2021 04:56 PM       No results found for: PROTHROMBTM, INR     Lab Results   Component Value Date/Time    WBC 4.5 (L) 06/06/2021 04:56 PM    RBC 4.18 (L) 06/06/2021 04:56 PM    HEMOGLOBIN 13.1 06/06/2021 04:56 PM    HEMATOCRIT 40.7 06/06/2021 04:56 PM    MCV 97.4 06/06/2021 04:56 PM    MCH 31.3 06/06/2021 04:56 PM    MCHC 32.2 (L) 06/06/2021 04:56 PM    MPV 9.7 06/06/2021 04:56 PM    NEUTSPOLYS 73.90 (H) 06/06/2021 04:56 PM     LYMPHOCYTES 18.60 (L) 06/06/2021 04:56 PM    MONOCYTES 6.70 06/06/2021 04:56 PM    EOSINOPHILS 0.20 06/06/2021 04:56 PM    BASOPHILS 0.20 06/06/2021 04:56 PM        No results found for: HBA1C     Imaging: I personally reviewed following images, these are my reads  Xray right shoulder 06/06/2022  Moderate osteoarthritis glenohumeral and AC joints    Xray right knee 06/06/2022  Mild hip osteoarthritis    Xray hips 06/06/2022  Moderate hip osteoarthritis bilaterally    CT chest abdomen pelvis June 5, 2020  Reviewed for orthopedic issues.  There are degenerative changes in the hips bilaterally.  Degenerative changes in the lumbar spine greatest at L4-5 with decreased disc height and osteophytes noted.    IMAGING radiology reads. I reviewed the following radiology reads   Xray hips 06/06/2022    IMPRESSION:  Moderate osteoarthritis of the bilateral hip joints.      Xray right shoulder 06/06/2022  FINDINGS:     No acute fracture or dislocation.  Moderate osteoarthritis of the right glenohumeral and AC joints.     IMPRESSION:  1. No acute osseous abnormality.      Xray right knee 06/06/2022  IMPRESSION:  1. No acute osseous abnormality.                                                    Diagnosis   Visit Diagnoses     ICD-10-CM   1. Arthritis of shoulder region, right  M19.011   2. Primary osteoarthritis of both hips  M16.0               ASSESSMENT:  Emily Barnes 77 y.o. female seen for above     Emily was seen today for follow-up.    Diagnoses and all orders for this visit:    Arthritis of shoulder region, right  -     traMADol (ULTRAM) 50 MG Tab; Take 1 Tablet by mouth every 6 hours as needed for Severe Pain for up to 7 days.  -     Consent for Opiate Prescription    Primary osteoarthritis of both hips      1.  Discussed that she did not have significant improvement after right glenohumeral joint injection.  For now, she declines further work-up and is not interested in the possibility of surgical management.   She does have glenohumeral joint arthritis which likely accounts for her pain, although we discussed the possibility of rotator cuff pathology.  Given her other medical issues and work demands she would not pursue surgery.  2.  She continues to have improvement after right hip injection  3.  Discussed concerns about anti-inflammatories which have not been helpful for the symptoms in the past.  Discussed trial of tramadol for severe pain.   reviewed.  I have written the prescription for as needed use of severe pain.  Discussed possible side effects.  Consents for this medication obtained.  If she finds this is beneficial and tolerates without side effects we will consider continuing this medication.    Follow-up: Return in about 4 weeks (around 10/12/2022), or if symptoms worsen or fail to improve.      Thank you very much for asking me to participate in Emily Barnes's care.  Please contact me with any questions or concerns.    Please note that this dictation was created using voice recognition software. I have made every reasonable attempt to correct obvious errors but there may be errors of grammar and content that I may have overlooked prior to finalization of this note.      Riley Phipps MD  Physical Medicine and Rehabilitation  Interventional Spine and Sports Physiatry  [facility]

## 2022-10-14 ENCOUNTER — OFFICE VISIT (OUTPATIENT)
Dept: PHYSICAL MEDICINE AND REHAB | Facility: MEDICAL CENTER | Age: 77
End: 2022-10-14
Payer: COMMERCIAL

## 2022-10-14 VITALS
SYSTOLIC BLOOD PRESSURE: 108 MMHG | OXYGEN SATURATION: 93 % | RESPIRATION RATE: 16 BRPM | DIASTOLIC BLOOD PRESSURE: 70 MMHG | HEART RATE: 86 BPM | WEIGHT: 145 LBS | HEIGHT: 63 IN | BODY MASS INDEX: 25.69 KG/M2 | TEMPERATURE: 98.6 F

## 2022-10-14 DIAGNOSIS — M25.511 RIGHT SHOULDER PAIN, UNSPECIFIED CHRONICITY: ICD-10-CM

## 2022-10-14 DIAGNOSIS — M19.011 ARTHRITIS OF SHOULDER REGION, RIGHT: ICD-10-CM

## 2022-10-14 DIAGNOSIS — M79.18 CHRONIC MUSCULOSKELETAL PAIN: ICD-10-CM

## 2022-10-14 DIAGNOSIS — G89.29 CHRONIC MUSCULOSKELETAL PAIN: ICD-10-CM

## 2022-10-14 DIAGNOSIS — M16.0 PRIMARY OSTEOARTHRITIS OF BOTH HIPS: ICD-10-CM

## 2022-10-14 PROCEDURE — 99214 OFFICE O/P EST MOD 30 MIN: CPT | Performed by: PHYSICAL MEDICINE & REHABILITATION

## 2022-10-14 RX ORDER — DULOXETIN HYDROCHLORIDE 30 MG/1
30 CAPSULE, DELAYED RELEASE ORAL 2 TIMES DAILY
Qty: 60 CAPSULE | Refills: 1 | Status: SHIPPED | OUTPATIENT
Start: 2022-10-14 | End: 2022-11-13

## 2022-10-14 ASSESSMENT — PATIENT HEALTH QUESTIONNAIRE - PHQ9
CLINICAL INTERPRETATION OF PHQ2 SCORE: 2
5. POOR APPETITE OR OVEREATING: 0 - NOT AT ALL
SUM OF ALL RESPONSES TO PHQ QUESTIONS 1-9: 5

## 2022-10-14 ASSESSMENT — FIBROSIS 4 INDEX: FIB4 SCORE: 2.291486218791003532

## 2022-10-14 ASSESSMENT — PAIN SCALES - GENERAL: PAINLEVEL: 8=MODERATE-SEVERE PAIN

## 2022-10-14 NOTE — PATIENT INSTRUCTIONS
Start duloxetine 30mg at bedtime for 7 days, then increase to 30mg in the morning and 30mg at bedtime.

## 2022-10-14 NOTE — PROGRESS NOTES
Follow-up patient note    Physiatry (physical medicine and  Rehabilitation), interventional spine and sports medicine    Date of Service: 10/14/2022    Chief complaint:   Chief Complaint   Patient presents with    Follow-Up     Arthritis of shoulder region, right         HISTORY    HPI: Emily Barnes 77 y.o. female who presents today for evaluation of pain in her right shoulder and pain in her right hip.    Emily returns and reports that the trial of tramadol was not effective.  This did not seem to improve her symptoms.  She did not have any side effects.  She has been taking Tylenol for her pain.    Her right hip pain has returned.  She has been doing well after injection on August 4, 2022.  This pain is in her groin and thigh.    No change to manage symptoms as it relates to the right shoulder she does not have any neck pain or radicular symptoms.  Similarly she is not complaining of low back pain or other change from how her symptoms were prior to the injection previously.      Medical records review:  I reviewed the note from the referring provider William Cruz M.D.    Previous treatments:    Physical Therapy: No    Medications the patient is tried: NSAIDs    Previous interventions: none    Previous surgeries to relieve the above pain:  none      ROS:   : urinary urgency at times  Red Flags ROS:   Fever, Chills, Sweats: Denies  Involuntary Weight Loss: Denies  Bladder Incontinence: Denies  Bowel Incontinence: Denies  Saddle Anesthesia: Denies    All other systems reviewed and negative.       PMHx:   Past Medical History:   Diagnosis Date    Anemia 1962    Breast cancer (HCC)     Cancer (HCC) 2011    right breast    Cancer (HCC) 2020    lung     COPD (chronic obstructive pulmonary disease) (HCC)     Cough     Dental disorder     upper and lower dentures    EMPHYSEMA     High cholesterol     Pain     occasional breast pain    Pneumonia 1948    Renal disorder 1997    hx of stones    Renal disorder      stones and cyst; 4/29/20 pt reports unaware of renal cyst     S/P radiation therapy     for breast cancer 2011 @ [facility] Dr. Mccartney    Small cell lung cancer (HCC)     Snoring     Sputum production     Urinary incontinence        PSHx:   Past Surgical History:   Procedure Laterality Date    AR DRAIN/INJECT LARGE JOINT/BURSA Right 8/4/2022    Procedure: RIGHT hip intra-articular injection. Patient reports emesis with iodine, use non- iodine.;  Surgeon: Riley Phipps M.D.;  [facility];  Service: Pain Management    AR BRONCHOSCOPY,DIAGNOSTIC  5/7/2020    Procedure: BRONCHOSCOPY-FIBER OPTIC WITH POSSIBLE WASH, BRUSH, BROCHOALVEOLAR LAVAGE, BIOPSY FINE NEEDLE ASPIRATION;  Surgeon: Peter Foster M.D.;  Location: [facility];  Service: Pulmonary    ENDOBRONCHIAL US ADD-ON  5/7/2020    Procedure: ENDOBRONCHIAL ULTRASOUND (EBUS);  Surgeon: Peter Foster M.D.;  Location: [facility];  Service: Pulmonary    COLONOSCOPY WITH BIOPSY  3/15/2012    Performed by RC FANG at SURGERY [facility]    BREAST BIOPSY  8/23/2011    Performed by MOLLY PARRA at SURGERY SAME DAY [facility]    NODE BIOPSY SENTINEL  8/8/2011    Performed by MOLLY PARRA at SURGERY SAME DAY [facility]    LUMPECTOMY  8/8/2011    Performed by MOLLY PARRA at SURGERY SAME DAY [facility]    TONSILLECTOMY  1972    AR RADIATION THERAPY PLAN SIMPLE         Family history   Family History   Problem Relation Age of Onset    Stroke Other     Heart Disease Other     Cancer Mother         lung cancer    Heart Disease Father     Cancer Sister         lung cancer    Cancer Sister         lung cancer    Cancer Sister         unknown cancer         Medications:   Current Outpatient Medications   Medication    DULoxetine (CYMBALTA) 30 MG Cap DR Particles    Cholecalciferol (VITAMIN D) 50 MCG (2000 UT) Cap    atorvastatin (LIPITOR) 10 MG Tab    anastrozole (ARIMIDEX) 1 MG TABS     "lidocaine-prilocaine (EMLA) 2.5-2.5 % Cream    Pseudoeph-Doxylamine-DM-APAP (NYQUIL PO)    denosumab (PROLIA) 60 MG/ML Solution Prefilled Syringe     No current facility-administered medications for this visit.       Allergies:   Allergies   Allergen Reactions    Flu Virus Vaccine Hives    Skin Adhesives Rash     reddness       Social Hx:   Social History     Socioeconomic History    Marital status:      Spouse name: Not on file    Number of children: Not on file    Years of education: Not on file    Highest education level: Not on file   Occupational History    Not on file   Tobacco Use    Smoking status: Every Day     Packs/day: 2.00     Years: 58.00     Pack years: 116.00     Types: Cigarettes     Start date: 1962    Smokeless tobacco: Never   Vaping Use    Vaping Use: Never used   Substance and Sexual Activity    Alcohol use: Yes     Comment: 4 per week    Drug use: No    Sexual activity: Not on file   Other Topics Concern    Not on file   Social History Narrative    ** Merged History Encounter **          Social Determinants of Health     Financial Resource Strain: Not on file   Food Insecurity: Not on file   Transportation Needs: Not on file   Physical Activity: Not on file   Stress: Not on file   Social Connections: Not on file   Intimate Partner Violence: Not on file   Housing Stability: Not on file         EXAMINATION     Physical Exam:   Vitals: /70 (BP Location: Right arm, Patient Position: Sitting)   Pulse 86   Temp 37 °C (98.6 °F) (Temporal)   Resp 16   Ht 1.6 m (5' 3\")   Wt 65.8 kg (145 lb)   SpO2 93%     Constitutional:   Body Habitus: Body mass index is 25.69 kg/m².  Cooperation: Fully cooperates with exam  Appearance: Well-groomed, well-nourished, not disheveled, in no acute distress    Eyes: No scleral icterus, no proptosis     ENT -no obvious auditory deficits, wearing a face mask    Skin -no rashes or lesions noted    Respiratory-  breathing comfortable on room air, no audible " wheezing    Cardiovascular- No lower extremity edema is noted.     Psychiatric- alert and oriented ×3. Normal affect.     Gait - normal gait, no use of ambulatory device,   Trendelenburg gait    Musculoskeletal  Right shoulder pain with range of motion  Daniels test is positive on the right Neer's test is positive for pain; empty can test is positive on the right  Manual muscle testing reveals no focal motor deficits in the upper extremities bilaterally  Sensory is intact to light touch in the upper extremities bilaterally  Tinel's is negative at the wrists bilaterally  Reflexes are 2+ biceps and triceps bilaterally  Pain with range of motion of the right hip in internal and external rotation, range of motion is nonpainful on the left  No focal motor or sensory deficits in the lower extremities bilaterally      MEDICAL DECISION MAKING    Medical records review: see under HPI section.     DATA    Labs:   Lab Results   Component Value Date/Time    SODIUM 142 09/02/2022 11:04 AM    POTASSIUM 3.7 09/02/2022 11:04 AM    CHLORIDE 106 09/02/2022 11:04 AM    CO2 25 09/02/2022 11:04 AM    ANION 11.0 09/02/2022 11:04 AM    GLUCOSE 101 (H) 09/02/2022 11:04 AM    BUN 14 09/02/2022 11:04 AM    CREATININE 0.68 09/02/2022 11:04 AM    CALCIUM 9.2 09/02/2022 11:04 AM    ASTSGOT 18 06/06/2021 04:56 PM    ALTSGPT 11 06/06/2021 04:56 PM    TBILIRUBIN 0.3 06/06/2021 04:56 PM    ALBUMIN 3.6 06/06/2021 04:56 PM    TOTPROTEIN 6.2 06/06/2021 04:56 PM    GLOBULIN 2.6 06/06/2021 04:56 PM    AGRATIO 1.4 06/06/2021 04:56 PM       No results found for: PROTHROMBTM, INR     Lab Results   Component Value Date/Time    WBC 4.5 (L) 06/06/2021 04:56 PM    RBC 4.18 (L) 06/06/2021 04:56 PM    HEMOGLOBIN 13.1 06/06/2021 04:56 PM    HEMATOCRIT 40.7 06/06/2021 04:56 PM    MCV 97.4 06/06/2021 04:56 PM    MCH 31.3 06/06/2021 04:56 PM    MCHC 32.2 (L) 06/06/2021 04:56 PM    MPV 9.7 06/06/2021 04:56 PM    NEUTSPOLYS 73.90 (H) 06/06/2021 04:56 PM    LYMPHOCYTES  18.60 (L) 06/06/2021 04:56 PM    MONOCYTES 6.70 06/06/2021 04:56 PM    EOSINOPHILS 0.20 06/06/2021 04:56 PM    BASOPHILS 0.20 06/06/2021 04:56 PM        No results found for: HBA1C     Imaging: I personally reviewed following images, these are my reads  Xray right shoulder 06/06/2022  Moderate osteoarthritis glenohumeral and AC joints    Xray right knee 06/06/2022  Mild hip osteoarthritis    Xray hips 06/06/2022  Moderate hip osteoarthritis bilaterally    CT chest abdomen pelvis June 5, 2020  Reviewed for orthopedic issues.  There are degenerative changes in the hips bilaterally.  Degenerative changes in the lumbar spine greatest at L4-5 with decreased disc height and osteophytes noted.    IMAGING radiology reads. I reviewed the following radiology reads   Xray hips 06/06/2022    IMPRESSION:  Moderate osteoarthritis of the bilateral hip joints.      Xray right shoulder 06/06/2022  FINDINGS:     No acute fracture or dislocation.  Moderate osteoarthritis of the right glenohumeral and AC joints.     IMPRESSION:  1. No acute osseous abnormality.      Xray right knee 06/06/2022  IMPRESSION:  1. No acute osseous abnormality.                                                    Diagnosis   Visit Diagnoses     ICD-10-CM   1. Arthritis of shoulder region, right  M19.011   2. Right shoulder pain, unspecified chronicity  M25.511   3. Primary osteoarthritis of both hips  M16.0   4. Chronic musculoskeletal pain  M79.18    G89.29           ASSESSMENT:  Emily Norrie 77 y.o. female seen for above     Emily was seen today for follow-up.    Diagnoses and all orders for this visit:    Arthritis of shoulder region, right  -     DULoxetine (CYMBALTA) 30 MG Cap DR Particles; Take 1 Capsule by mouth 2 times a day for 30 days.  -     MR-SHOULDER-W/O RIGHT; Future    Right shoulder pain, unspecified chronicity  -     MR-SHOULDER-W/O RIGHT; Future    Primary osteoarthritis of both hips  -     DULoxetine (CYMBALTA) 30 MG Cap   Particles; Take 1 Capsule by mouth 2 times a day for 30 days.    Chronic musculoskeletal pain  -     DULoxetine (CYMBALTA) 30 MG Cap DR Particles; Take 1 Capsule by mouth 2 times a day for 30 days.      Discussed possible surgical referral for the right shoulder.  She has been very hesitant about considering this but pain is worsening.  MRI of the right shoulder ordered to further assess, she has not responded to physical therapy, medication and injections, thusfar.  Discussed that her right hip pain has returned.  Unfortunately she only had about 2 months of relief from this.  Discontinue Ultram.  Discussed pros and cons of different medications that we might use for pain.  Trial of duloxetine starting with 30 mg for a week and then increase to 30 mg twice a day.  We discussed possible side effects and possible benefits as a relates to her chronic musculoskeletal pain.      Follow-up: Return in about 7 weeks (around 12/2/2022), or after MRI shoulder.      Thank you very much for asking me to participate in Emily Barnes's care.  Please contact me with any questions or concerns.    Please note that this dictation was created using voice recognition software. I have made every reasonable attempt to correct obvious errors but there may be errors of grammar and content that I may have overlooked prior to finalization of this note.      Riley Phipps MD  Physical Medicine and Rehabilitation  Interventional Spine and Sports Physiatry  [facility]

## 2022-10-24 ENCOUNTER — HOSPITAL ENCOUNTER (OUTPATIENT)
Dept: RADIOLOGY | Facility: MEDICAL CENTER | Age: 77
End: 2022-10-24
Attending: RADIOLOGY
Payer: COMMERCIAL

## 2022-10-24 DIAGNOSIS — C34.11 MALIGNANT NEOPLASM OF UPPER LOBE OF RIGHT LUNG (HCC): ICD-10-CM

## 2022-10-24 DIAGNOSIS — C34.90 SMALL CELL LUNG CANCER (HCC): ICD-10-CM

## 2022-10-24 PROCEDURE — A9576 INJ PROHANCE MULTIPACK: HCPCS | Performed by: RADIOLOGY

## 2022-10-24 PROCEDURE — 70553 MRI BRAIN STEM W/O & W/DYE: CPT

## 2022-10-24 PROCEDURE — 700117 HCHG RX CONTRAST REV CODE 255: Performed by: RADIOLOGY

## 2022-10-24 RX ADMIN — GADOTERIDOL 15 ML: 279.3 INJECTION, SOLUTION INTRAVENOUS at 10:31

## 2022-10-27 ENCOUNTER — HOSPITAL ENCOUNTER (OUTPATIENT)
Dept: RADIATION ONCOLOGY | Facility: MEDICAL CENTER | Age: 77
End: 2022-10-31
Attending: RADIOLOGY
Payer: COMMERCIAL

## 2022-10-27 DIAGNOSIS — C34.90 SMALL CELL LUNG CANCER (HCC): ICD-10-CM

## 2022-10-27 NOTE — PROGRESS NOTES
Called patient and let know that MRI brain normal. CT CAP planned for March 2023 with MRI brain at that time as well.     Results for orders placed during the hospital encounter of 10/24/22    MR-BRAIN-WITH & W/O    Addendum 10/24/2022  2:42 PM  Addendum: When compared with the 5/1/2022, there has been no significant interval change.    Impression  1.  There is no evidence of intracranial metastasis.  2.  No acute abnormality.  3.  Moderate chronic microvascular ischemic disease.      Results for orders placed during the hospital encounter of 05/01/22    MR-BRAIN-WITH & W/O    Impression  1.  No acute intracranial abnormality.  2.  No evidence of intracranial metastatic disease. Pathologic enhancement.  3.  Parenchymal atrophy and severe white matter disease likely representing microvascular ischemic change again seen.      Results for orders placed during the hospital encounter of 10/12/21    MR-BRAIN-WITH & W/O    Impression  No acute process.    No evidence of intracranial metastatic disease. No abnormal intracranial enhancement is seen.    Stable appearance of extensive periventricular and deep white matter T2 hyperintensity involving both cerebral hemispheres as well as the central genaro.      Results for orders placed during the hospital encounter of 04/12/21    MR-BRAIN-WITH & W/O    Impression  1.  No evidence of intracranial metastatic disease  2.  Remote LEFT thalamic lacunar infarction is redemonstrated  3.  Moderate atrophy  4.  Advanced white matter changes      Results for orders placed during the hospital encounter of 01/06/21    MR-BRAIN-WITH & W/O    Impression  1.  Age-related cerebral atrophy.    2.  Extensive periventricular, juxtacortical, and pontine white matter changes consistent with chronic microvascular ischemic gliosis.    3.  Prominent chronic area of lacunar-type infarction involving the left thalamus.    4.  No evidence of metastatic disease to the brain parenchyma.      Results for  orders placed during the hospital encounter of 10/28/20    MR-BRAIN-WITH & W/O    Impression  1.  No evidence of acute territorial infarct, intracranial hemorrhage or mass lesion. No evidence of intracranial metastatic disease.  2.  Moderate diffuse cerebral substance loss.  3.  Advanced microangiopathic ischemic change versus demyelination or gliosis.      Results for orders placed in visit on 05/08/20    MR-BRAIN-WITH & W/O    Impression  1.  There is no intracranial metastasis.  2.  Mild cerebral volume loss.  3.  Diffuse severe chronic microvascular ischemic disease.  4.  Chronic lacunar infarcts.

## 2022-11-30 ENCOUNTER — OFFICE VISIT (OUTPATIENT)
Dept: PHYSICAL MEDICINE AND REHAB | Facility: MEDICAL CENTER | Age: 77
End: 2022-11-30
Payer: COMMERCIAL

## 2022-11-30 VITALS
BODY MASS INDEX: 28.19 KG/M2 | TEMPERATURE: 97.4 F | HEIGHT: 62 IN | DIASTOLIC BLOOD PRESSURE: 68 MMHG | HEART RATE: 97 BPM | WEIGHT: 153.2 LBS | OXYGEN SATURATION: 94 % | SYSTOLIC BLOOD PRESSURE: 128 MMHG

## 2022-11-30 DIAGNOSIS — G89.29 CHRONIC MUSCULOSKELETAL PAIN: ICD-10-CM

## 2022-11-30 DIAGNOSIS — M79.18 CHRONIC MUSCULOSKELETAL PAIN: ICD-10-CM

## 2022-11-30 DIAGNOSIS — M19.011 ARTHRITIS OF SHOULDER REGION, RIGHT: ICD-10-CM

## 2022-11-30 DIAGNOSIS — M16.0 PRIMARY OSTEOARTHRITIS OF BOTH HIPS: ICD-10-CM

## 2022-11-30 PROCEDURE — 99214 OFFICE O/P EST MOD 30 MIN: CPT | Performed by: PHYSICAL MEDICINE & REHABILITATION

## 2022-11-30 RX ORDER — DULOXETIN HYDROCHLORIDE 60 MG/1
60 CAPSULE, DELAYED RELEASE ORAL DAILY
Qty: 30 CAPSULE | Refills: 3 | Status: SHIPPED | OUTPATIENT
Start: 2022-11-30 | End: 2023-03-30

## 2022-11-30 ASSESSMENT — PATIENT HEALTH QUESTIONNAIRE - PHQ9
SUM OF ALL RESPONSES TO PHQ QUESTIONS 1-9: 4
5. POOR APPETITE OR OVEREATING: 0 - NOT AT ALL
CLINICAL INTERPRETATION OF PHQ2 SCORE: 1

## 2022-11-30 ASSESSMENT — PAIN SCALES - GENERAL: PAINLEVEL: 5=MODERATE PAIN

## 2022-11-30 ASSESSMENT — FIBROSIS 4 INDEX: FIB4 SCORE: 2.291486218791003532

## 2022-11-30 NOTE — PROGRESS NOTES
"Follow-up patient note    Physiatry (physical medicine and  Rehabilitation), interventional spine and sports medicine    Date of Service: 11/30/2022    Chief complaint:   Chief Complaint   Patient presents with    Follow-Up     Hip pain           HISTORY    HPI: Emily Barnes 77 y.o. female who presents today for follow-up evaluation of pain in her right shoulder and pain in her right hip.    Since the last visit, Emily reports that she has started duloxetine.  She is now taking 60 mg daily.  She has been at this dose for approximately a month.  She does not have any side effects and reports that this has decreased her pain significantly.  Her hip and leg pain are much better and she feels that she is walking more comfortably.    Right shoulder pain this still present but this is not bothering her that much right now and she feels that she can \"live with it \"      Medical records review:  I reviewed the note from the referring provider William Cruz M.D.    Previous treatments:    Physical Therapy: No    Medications the patient is tried: NSAIDs    Previous interventions: none    Previous surgeries to relieve the above pain:  none      ROS:   : urinary urgency at times  Red Flags ROS:   Fever, Chills, Sweats: Denies  Involuntary Weight Loss: Denies  Bladder Incontinence: Denies  Bowel Incontinence: Denies  Saddle Anesthesia: Denies    All other systems reviewed and negative.       PMHx:   Past Medical History:   Diagnosis Date    Anemia 1962    Breast cancer (HCC)     Cancer (HCC) 2011    right breast    Cancer (HCC) 2020    lung     COPD (chronic obstructive pulmonary disease) (HCC)     Cough     Dental disorder     upper and lower dentures    EMPHYSEMA     High cholesterol     Pain     occasional breast pain    Pneumonia 1948    Renal disorder 1997    hx of stones    Renal disorder     stones and cyst; 4/29/20 pt reports unaware of renal cyst     S/P radiation therapy     for breast cancer 2011 @ [facility]    Small cell lung cancer (HCC)     Snoring     Sputum production     Urinary incontinence        PSHx:   Past Surgical History:   Procedure Laterality Date    DC DRAIN/INJECT LARGE JOINT/BURSA Right 8/4/2022    Procedure: RIGHT hip intra-articular injection. Patient reports emesis with iodine, use non- iodine.;  Surgeon: Riley Phipps M.D.;  [facility];  Service: Pain Management    DC BRONCHOSCOPY,DIAGNOSTIC  5/7/2020    Procedure: BRONCHOSCOPY-FIBER OPTIC WITH POSSIBLE WASH, BRUSH, BROCHOALVEOLAR LAVAGE, BIOPSY FINE NEEDLE ASPIRATION;  Surgeon: Peter Foster M.D.;  Location: [facility];  Service: Pulmonary    ENDOBRONCHIAL US ADD-ON  5/7/2020    Procedure: ENDOBRONCHIAL ULTRASOUND (EBUS);  Surgeon: Peter Foster M.D.;  Location: [facility];  Service: Pulmonary    COLONOSCOPY WITH BIOPSY  3/15/2012    Performed by RC FANG at [facility]    BREAST BIOPSY  8/23/2011    Performed by MOLLY PARRA at [facility]    NODE BIOPSY SENTINEL  8/8/2011    Performed by MOLLY PARRA at [facility]    LUMPECTOMY  8/8/2011    Performed by MOLLY PARRA at [facility]    TONSILLECTOMY  1972    DC RADIATION THERAPY PLAN SIMPLE         Family history   Family History   Problem Relation Age of Onset    Stroke Other     Heart Disease Other     Cancer Mother         lung cancer    Heart Disease Father     Cancer Sister         lung cancer    Cancer Sister         lung cancer    Cancer Sister         unknown cancer         Medications:   Current Outpatient Medications   Medication    DULoxetine (CYMBALTA) 60 MG Cap DR Particles delayed-release capsule    lidocaine-prilocaine (EMLA) 2.5-2.5 % Cream    Cholecalciferol (VITAMIN D) 50 MCG (2000 UT) Cap    atorvastatin (LIPITOR) 10 MG Tab    Pseudoeph-Doxylamine-DM-APAP (NYQUIL PO)     No current facility-administered  "medications for this visit.       Allergies:   Allergies   Allergen Reactions    Flu Virus Vaccine Hives    Skin Adhesives Rash     reddness       Social Hx:   Social History     Socioeconomic History    Marital status:      Spouse name: Not on file    Number of children: Not on file    Years of education: Not on file    Highest education level: Not on file   Occupational History    Not on file   Tobacco Use    Smoking status: Every Day     Packs/day: 2.00     Years: 58.00     Pack years: 116.00     Types: Cigarettes     Start date: 1962    Smokeless tobacco: Never   Vaping Use    Vaping Use: Never used   Substance and Sexual Activity    Alcohol use: Yes     Comment: 4 per week    Drug use: No    Sexual activity: Not on file   Other Topics Concern    Not on file   Social History Narrative    ** Merged History Encounter **          Social Determinants of Health     Financial Resource Strain: Not on file   Food Insecurity: Not on file   Transportation Needs: Not on file   Physical Activity: Not on file   Stress: Not on file   Social Connections: Not on file   Intimate Partner Violence: Not on file   Housing Stability: Not on file         EXAMINATION     Physical Exam:   Vitals: /68 (BP Location: Right arm, Patient Position: Sitting, BP Cuff Size: Adult long)   Pulse 97   Temp 36.3 °C (97.4 °F) (Temporal)   Ht 1.575 m (5' 2\")   Wt 69.5 kg (153 lb 3.2 oz)   SpO2 94%     Constitutional:   Body Habitus: Body mass index is 28.02 kg/m².  Cooperation: Fully cooperates with exam  Appearance: Well-groomed, well-nourished, not disheveled, in no acute distress    Eyes: No scleral icterus, no proptosis     ENT -no obvious auditory deficits, wearing a face mask    Skin -no rashes or lesions noted    Respiratory-  breathing comfortable on room air, no audible wheezing    Cardiovascular- No lower extremity edema is noted.     Psychiatric- alert and oriented ×3. Normal affect.     Gait - normal gait, no use of " ambulatory device, mild Trendelenburg gait    Musculoskeletal  Right shoulder pain with functional range of motion, functional range of motion on the left.  There is some mild pain she reports on the right    No focal motor or sensory deficits in the lower extremities bilaterally  Reflexes 2+ patella and Achilles bilaterally    MEDICAL DECISION MAKING    Medical records review: see under HPI section.     DATA    Labs:   Lab Results   Component Value Date/Time    SODIUM 142 09/02/2022 11:04 AM    POTASSIUM 3.7 09/02/2022 11:04 AM    CHLORIDE 106 09/02/2022 11:04 AM    CO2 25 09/02/2022 11:04 AM    ANION 11.0 09/02/2022 11:04 AM    GLUCOSE 101 (H) 09/02/2022 11:04 AM    BUN 14 09/02/2022 11:04 AM    CREATININE 0.68 09/02/2022 11:04 AM    CALCIUM 9.2 09/02/2022 11:04 AM    ASTSGOT 18 06/06/2021 04:56 PM    ALTSGPT 11 06/06/2021 04:56 PM    TBILIRUBIN 0.3 06/06/2021 04:56 PM    ALBUMIN 3.6 06/06/2021 04:56 PM    TOTPROTEIN 6.2 06/06/2021 04:56 PM    GLOBULIN 2.6 06/06/2021 04:56 PM    AGRATIO 1.4 06/06/2021 04:56 PM       No results found for: PROTHROMBTM, INR     Lab Results   Component Value Date/Time    WBC 4.5 (L) 06/06/2021 04:56 PM    RBC 4.18 (L) 06/06/2021 04:56 PM    HEMOGLOBIN 13.1 06/06/2021 04:56 PM    HEMATOCRIT 40.7 06/06/2021 04:56 PM    MCV 97.4 06/06/2021 04:56 PM    MCH 31.3 06/06/2021 04:56 PM    MCHC 32.2 (L) 06/06/2021 04:56 PM    MPV 9.7 06/06/2021 04:56 PM    NEUTSPOLYS 73.90 (H) 06/06/2021 04:56 PM    LYMPHOCYTES 18.60 (L) 06/06/2021 04:56 PM    MONOCYTES 6.70 06/06/2021 04:56 PM    EOSINOPHILS 0.20 06/06/2021 04:56 PM    BASOPHILS 0.20 06/06/2021 04:56 PM        No results found for: HBA1C     Imaging: I personally reviewed following images, these are my reads  Xray right shoulder 06/06/2022  Moderate osteoarthritis glenohumeral and AC joints    Xray right knee 06/06/2022  Mild hip osteoarthritis    Xray hips 06/06/2022  Moderate hip osteoarthritis bilaterally    CT chest abdomen pelvis June 5,  2020  Reviewed for orthopedic issues.  There are degenerative changes in the hips bilaterally.  Degenerative changes in the lumbar spine greatest at L4-5 with decreased disc height and osteophytes noted.    IMAGING radiology reads. I reviewed the following radiology reads   Xray hips 06/06/2022    IMPRESSION:  Moderate osteoarthritis of the bilateral hip joints.      Xray right shoulder 06/06/2022  FINDINGS:     No acute fracture or dislocation.  Moderate osteoarthritis of the right glenohumeral and AC joints.     IMPRESSION:  1. No acute osseous abnormality.      Xray right knee 06/06/2022  IMPRESSION:  1. No acute osseous abnormality.                                                    Diagnosis   Visit Diagnoses     ICD-10-CM   1. Primary osteoarthritis of both hips  M16.0   2. Chronic musculoskeletal pain  M79.18    G89.29   3. Arthritis of shoulder region, right  M19.011             ASSESSMENT:  Emily Barnes 77 y.o. female seen for above     Emily was seen today for follow-up.    Diagnoses and all orders for this visit:    Primary osteoarthritis of both hips  -     DULoxetine (CYMBALTA) 60 MG Cap DR Particles delayed-release capsule; Take 1 Capsule by mouth every day for 120 days.    Chronic musculoskeletal pain  -     DULoxetine (CYMBALTA) 60 MG Cap DR Particles delayed-release capsule; Take 1 Capsule by mouth every day for 120 days.    Arthritis of shoulder region, right  -     DULoxetine (CYMBALTA) 60 MG Cap DR Particles delayed-release capsule; Take 1 Capsule by mouth every day for 120 days.      The patient has been doing very well with the addition of duloxetine 60 mg daily.  Encouraged activity as tolerated.  I have given a prescription for the next 4 months.  Reviewed that I have changed this from a 30 mg pill to a 60 mg that she will take once a day.  At this time she feels that her right shoulder pain is something that she can live with.  MRI right shoulder was ordered at last visit but this  has not been done yet.    Discussed that this medication can be continued by her PCP, Dr. Jc.  I am happy to see her as needed      Follow-up: Return in about 4 months (around 3/30/2023), or if symptoms worsen or fail to improve.      Thank you very much for asking me to participate in Emily Barnes's care.  Please contact me with any questions or concerns.    Please note that this dictation was created using voice recognition software. I have made every reasonable attempt to correct obvious errors but there may be errors of grammar and content that I may have overlooked prior to finalization of this note.      Riley Phipps MD  Physical Medicine and Rehabilitation  Interventional Spine and Sports Physiatry  [facility]    CC: Osei Jc MD

## 2023-03-08 ENCOUNTER — TELEPHONE (OUTPATIENT)
Dept: RADIATION ONCOLOGY | Facility: MEDICAL CENTER | Age: 78
End: 2023-03-08
Payer: MEDICARE

## 2023-03-08 DIAGNOSIS — C34.90 SMALL CELL LUNG CANCER (HCC): ICD-10-CM

## 2023-03-08 NOTE — TELEPHONE ENCOUNTER
----- Message from Alba Oquendo sent at 3/7/2023 11:17 AM PST -----  Regarding: PATIENT NEEDS A NON FASTING BMP LAB ORDER IN [facility]  PLEASE PUT A NON FASTING BMP LAB ORDER IN [facility]. PATIENT HAS A CT THIS WEEK WITH CONTRAST    THANK YOU

## 2023-03-11 ENCOUNTER — HOSPITAL ENCOUNTER (OUTPATIENT)
Dept: LAB | Facility: MEDICAL CENTER | Age: 78
End: 2023-03-11
Attending: RADIOLOGY
Payer: COMMERCIAL

## 2023-03-11 DIAGNOSIS — C34.90 SMALL CELL LUNG CANCER (HCC): ICD-10-CM

## 2023-03-11 LAB
ANION GAP SERPL CALC-SCNC: 15 MMOL/L (ref 7–16)
BUN SERPL-MCNC: 13 MG/DL (ref 8–22)
CALCIUM SERPL-MCNC: 9.5 MG/DL (ref 8.5–10.5)
CHLORIDE SERPL-SCNC: 102 MMOL/L (ref 96–112)
CO2 SERPL-SCNC: 22 MMOL/L (ref 20–33)
CREAT SERPL-MCNC: 0.73 MG/DL (ref 0.5–1.4)
GFR SERPLBLD CREATININE-BSD FMLA CKD-EPI: 85 ML/MIN/1.73 M 2
GLUCOSE SERPL-MCNC: 102 MG/DL (ref 65–99)
POTASSIUM SERPL-SCNC: 4.6 MMOL/L (ref 3.6–5.5)
SODIUM SERPL-SCNC: 139 MMOL/L (ref 135–145)

## 2023-03-11 PROCEDURE — 36415 COLL VENOUS BLD VENIPUNCTURE: CPT

## 2023-03-11 PROCEDURE — 80048 BASIC METABOLIC PNL TOTAL CA: CPT

## 2023-03-13 ENCOUNTER — HOSPITAL ENCOUNTER (OUTPATIENT)
Dept: RADIOLOGY | Facility: MEDICAL CENTER | Age: 78
End: 2023-03-13
Attending: RADIOLOGY
Payer: COMMERCIAL

## 2023-03-13 DIAGNOSIS — C34.90 SMALL CELL LUNG CANCER (HCC): ICD-10-CM

## 2023-03-13 PROCEDURE — 71260 CT THORAX DX C+: CPT

## 2023-03-13 PROCEDURE — 700117 HCHG RX CONTRAST REV CODE 255: Performed by: RADIOLOGY

## 2023-03-13 RX ADMIN — IOHEXOL 100 ML: 350 INJECTION, SOLUTION INTRAVENOUS at 10:16

## 2023-03-19 ENCOUNTER — APPOINTMENT (OUTPATIENT)
Dept: RADIOLOGY | Facility: MEDICAL CENTER | Age: 78
End: 2023-03-19
Attending: RADIOLOGY
Payer: COMMERCIAL

## 2023-04-17 ENCOUNTER — HOSPITAL ENCOUNTER (OUTPATIENT)
Dept: RADIOLOGY | Facility: MEDICAL CENTER | Age: 78
End: 2023-04-17
Attending: INTERNAL MEDICINE
Payer: COMMERCIAL

## 2023-04-17 DIAGNOSIS — Z12.31 ENCOUNTER FOR MAMMOGRAM TO ESTABLISH BASELINE MAMMOGRAM: ICD-10-CM

## 2023-04-17 PROCEDURE — 77063 BREAST TOMOSYNTHESIS BI: CPT

## 2023-06-01 ENCOUNTER — HOSPITAL ENCOUNTER (OUTPATIENT)
Dept: RADIOLOGY | Facility: MEDICAL CENTER | Age: 78
End: 2023-06-01
Attending: RADIOLOGY
Payer: COMMERCIAL

## 2023-06-01 DIAGNOSIS — C34.90 LUNG CANCER METASTATIC TO BRAIN (HCC): ICD-10-CM

## 2023-06-01 DIAGNOSIS — C79.31 LUNG CANCER METASTATIC TO BRAIN (HCC): ICD-10-CM

## 2023-06-01 DIAGNOSIS — C34.90 SMALL CELL LUNG CANCER (HCC): ICD-10-CM

## 2023-06-01 PROCEDURE — 700117 HCHG RX CONTRAST REV CODE 255: Performed by: RADIOLOGY

## 2023-06-01 PROCEDURE — A9579 GAD-BASE MR CONTRAST NOS,1ML: HCPCS | Performed by: RADIOLOGY

## 2023-06-01 PROCEDURE — 70553 MRI BRAIN STEM W/O & W/DYE: CPT

## 2023-06-01 RX ADMIN — GADOTERIDOL 15 ML: 279.3 INJECTION, SOLUTION INTRAVENOUS at 09:44

## 2023-06-05 ENCOUNTER — HOSPITAL ENCOUNTER (OUTPATIENT)
Dept: RADIATION ONCOLOGY | Facility: MEDICAL CENTER | Age: 78
End: 2023-06-30
Attending: RADIOLOGY
Payer: COMMERCIAL

## 2023-06-05 VITALS
DIASTOLIC BLOOD PRESSURE: 90 MMHG | WEIGHT: 151.68 LBS | SYSTOLIC BLOOD PRESSURE: 156 MMHG | HEART RATE: 87 BPM | BODY MASS INDEX: 27.74 KG/M2 | OXYGEN SATURATION: 96 %

## 2023-06-05 DIAGNOSIS — C34.90 SMALL CELL LUNG CANCER (HCC): ICD-10-CM

## 2023-06-05 PROCEDURE — 99212 OFFICE O/P EST SF 10 MIN: CPT | Performed by: RADIOLOGY

## 2023-06-05 PROCEDURE — 99214 OFFICE O/P EST MOD 30 MIN: CPT | Performed by: RADIOLOGY

## 2023-06-05 RX ORDER — DULOXETIN HYDROCHLORIDE 60 MG/1
60 CAPSULE, DELAYED RELEASE ORAL DAILY
COMMUNITY
End: 2023-06-20 | Stop reason: SDUPTHER

## 2023-06-05 ASSESSMENT — FIBROSIS 4 INDEX: FIB4 SCORE: 2.32

## 2023-06-05 ASSESSMENT — PAIN SCALES - GENERAL: PAINLEVEL: NO PAIN

## 2023-06-05 NOTE — PROGRESS NOTES
Patient was seen today in clinic with Dr. Cruz for a follow up.  Vitals signs and weight were obtained and pain assessment was completed.  Allergies and medications were reviewed with the patient.  Toxicities of treatment assessed.     Vitals/Pain:  Vitals:    06/05/23 1428   BP: (!) 156/90   BP Location: Left arm   Patient Position: Sitting   BP Cuff Size: Large adult   Pulse: 87   SpO2: 96%   Weight: 68.8 kg (151 lb 10.8 oz)   Pain Score: No pain        Allergies:   Flu virus vaccine and Skin adhesives    Current Medications:  Current Outpatient Medications   Medication Sig Dispense Refill    DULoxetine (CYMBALTA) 60 MG Cap DR Particles delayed-release capsule Take 60 mg by mouth every day.      lidocaine-prilocaine (EMLA) 2.5-2.5 % Cream APPLY A SMALL AMOUNT OF CREAM TO PORT SITE AN HOUR PRIOR TO TREATMENT      Cholecalciferol (VITAMIN D) 50 MCG (2000 UT) Cap Take 4,000 Units by mouth every day.      Pseudoeph-Doxylamine-DM-APAP (NYQUIL PO) Take  by mouth as needed. Takes prn for sleep approx twice a month (Patient not taking: Reported on 10/14/2022)      atorvastatin (LIPITOR) 10 MG Tab Take 10 mg by mouth every day. Takes in the afternoon       No current facility-administered medications for this encounter.         PCP:  Babita Garcia, Med Ass't

## 2023-06-05 NOTE — PROGRESS NOTES
RADIATION ONCOLOGY FOLLOW-UP    Patient name:  Emily Barnes    Primary Physician:  Osei CABRERA M.D. MRN: [id number]  [facility]: [id number]   Referring physician:  Peter Foster M.D.  : 1945, 78 y.o.     DATE OF SERVICE: 2023    IDENTIFICATION:   A 78 y.o. female with    Small cell lung cancer (HCC)  Staging form: Lung, AJCC 8th Edition  - Clinical stage from 2020: Stage IIB (cT1c, cN1, cM0) - Signed by William Cruz M.D. on 2020  Type of lung cancer: Small cell lung cancer      RADIATION SUMMARY:  Radiation Oncology          2020    07:42   Aria Course Treatment Dates   Course First Treatment Date  2020    Course Last Treatment Date  2020    Aria Treatment Summary   R_Lung_Nodes  Plan from Course C2_SCLC   Fraction 29 of 30    30 of 30    30 of 30    Elapsed Course Days  @  @     18 @ [id number]    Prescribed Fraction Dose 150 cGy    150 cGy    150 cGy    Prescribed Total Dose 4,500 cGy    4,500 cGy    4,500 cGy    R_Lung_Nodes  Reference Point from Course C2_SCLC   Elapsed Course Days  @     18 @ [id number]    18 @ [id number]    Session Dose 150 cGy    150 cGy    --    Total Dose 4,350 cGy    4,500 cGy    4,500 cGy    R_Lung_Nodes CP  Reference Point from Course C2_SCLC   Elapsed Course Days  @ [id number]    18 @ [id number]    Session Dose 155 cGy    155 cGy    --    Total Dose 4,482 cGy    4,636 cGy    4,636 cGy        HISTORY OF PRESENT ILLNESS:  Patient originally presented with abnormal CT scan finding in 2018 from her breast cancer surveillance.  It was grossly stable until 2019 which showed slight increase in size of right hilar bunny tissue now measuring 2.4 x 1.5 cm with no evidence of primary small cell lung cancer.  Patient had her screening mammogram 2019 which showed no evidence of latency.  Patient was seen  by Dr. Crain in pulmonary who ordered a PET CT scan in April 6, 2020 which showed FDG avid right hilar lymph node concerning for metastatic disease.  Patient underwent NIKOLAS guided biopsy by Dr. Foster with 11 R and 10 R sampled showing small cell lung cancer TTF-1 positive synaptophysin positive and chromogranin positive with KATHERIN-3 negative.  MRI brain was performed today May 18, 2020 and per my read shows no evidence of brain metastasis.  She is currently asymptomatic without any pain and is not on oxygen she denies any shortness of breath but does have a clear cough with no hemoptysis.        11/6/20  Called patient to order restaging MR brain and CT chest with contrast. Completed chemoradiation for limited stage small cell lung cancer, but declined further chemo or PCI to brain.     1/8/21  Patient doing well with increased energy and appetite. CT chest and MRI brain shows shrinkage of right hilar mass small 3 mm left upper lobe nodule nonspecific and no evidence of brain metastasis.  She denies any cough, shortness of breath or hemoptysis or weight loss.     4/12/21  Patient is doing well.  She has no evidence of CNS relapse in her CT chest today shows 2.5 mm right upper lobe nodule unchanged and right hilar lymph node measuring 5.6 x 8.8 mm previously 10 x 15 mm.  No other evidence of disease.  She is doing well overall she did have some nausea with the contrast administration.  She denies any weight loss or hemoptysis     10/14/21  Patient is doing well had MRI brain October 2021 which showed no evidence of metastasis.  I have ordered a repeat CT chest abdomen pelvis scan today to evaluate the rest of her body.  Patient denies any cough shortness of breath or weight loss.     5/2021  Patient doing well had MRI brain which showed no evidence of disease recurrence.  She has some joint pain especially in the right shoulder and right knee.  She did have bone scan which showed no evidence of bone metastasis but  significant arthritis.        06/03/22  Patient is doing well she did have restaging CT chest abdomen pelvis scan June 1, 2022 which showed overall stability however new 2.5 cm groundglass opacity of the complex of the right major and minor fissure while likely infectious or inflammatory should be closely followed.  No new lesions or lymphadenopathy in chest and pelvis.  Overall patient feeling well she still has arthritis that is seeing a physiatrist for this        9/12/2022  Patient doing well had repeat CT chest abdomen pelvis September 2020 which shows no evidence of disease recurrence.  She has MRI brain ordered for November 9, 2022.  She denies any headaches, nausea, vomiting or other neurologic symptoms.  She denies any cough shortness of breath or hemoptysis or weight loss.    INTERVAL HISTORY:  Patient doing well had MRI brain June 1, 2023 which showed no evidence of disease and CT chest abdomen pelvis March 13, 2023 which showed no evidence of disease recurrence.  Denies any headaches, nausea or vomiting.  Did have a fall recently.    PROBLEM LIST:  Patient Active Problem List   Diagnosis    Pulmonary nodule/R hilar mass    Breast cancer     Small cell lung cancer (HCC)    COPD with asthma (HCC)    Risk for falls       CURRENT MEDICATIONS:  Current Outpatient Medications   Medication Sig Dispense Refill    DULoxetine (CYMBALTA) 60 MG Cap DR Particles delayed-release capsule Take 60 mg by mouth every day.      lidocaine-prilocaine (EMLA) 2.5-2.5 % Cream APPLY A SMALL AMOUNT OF CREAM TO PORT SITE AN HOUR PRIOR TO TREATMENT      Cholecalciferol (VITAMIN D) 50 MCG (2000 UT) Cap Take 4,000 Units by mouth every day.      Pseudoeph-Doxylamine-DM-APAP (NYQUIL PO) Take  by mouth as needed. Takes prn for sleep approx twice a month (Patient not taking: Reported on 10/14/2022)      atorvastatin (LIPITOR) 10 MG Tab Take 10 mg by mouth every day. Takes in the afternoon       No current facility-administered medications  for this encounter.       ALLERGIES:  Flu virus vaccine and Skin adhesives    REVIEW OF SYSTEMS:    A complete review of system taken. Pertinent items in HPI.  All others negative.    PHYSICAL EXAM:  PERFORMANCE STATUS: ECOG 1    BP (!) 156/90 (BP Location: Left arm, Patient Position: Sitting, BP Cuff Size: Large adult)   Pulse 87   Wt 68.8 kg (151 lb 10.8 oz)   SpO2 96%   BMI 27.74 kg/m²   Physical Exam  Vitals reviewed.   HENT:      Head: Normocephalic.      Nose: Nose normal.      Mouth/Throat:      Mouth: Mucous membranes are moist.   Eyes:      Pupils: Pupils are equal, round, and reactive to light.   Cardiovascular:      Rate and Rhythm: Normal rate.   Pulmonary:      Effort: Pulmonary effort is normal.   Abdominal:      General: Abdomen is flat.   Musculoskeletal:         General: Normal range of motion.   Neurological:      Mental Status: She is alert.   Psychiatric:         Mood and Affect: Mood normal.         LABORATORY DATA:   Lab Results   Component Value Date/Time    WBC 4.5 (L) 06/06/2021 04:56 PM    RBC 4.18 (L) 06/06/2021 04:56 PM    HEMOGLOBIN 13.1 06/06/2021 04:56 PM    HEMATOCRIT 40.7 06/06/2021 04:56 PM    MCV 97.4 06/06/2021 04:56 PM    MCH 31.3 06/06/2021 04:56 PM    MCHC 32.2 (L) 06/06/2021 04:56 PM    RDW 50.7 (H) 06/06/2021 04:56 PM    PLATELETCT 180 06/06/2021 04:56 PM    MPV 9.7 06/06/2021 04:56 PM    NEUTSPOLYS 73.90 (H) 06/06/2021 04:56 PM    LYMPHOCYTES 18.60 (L) 06/06/2021 04:56 PM    MONOCYTES 6.70 06/06/2021 04:56 PM    EOSINOPHILS 0.20 06/06/2021 04:56 PM    BASOPHILS 0.20 06/06/2021 04:56 PM      Lab Results   Component Value Date/Time    SODIUM 139 03/11/2023 11:57 AM    POTASSIUM 4.6 03/11/2023 11:57 AM    CHLORIDE 102 03/11/2023 11:57 AM    CO2 22 03/11/2023 11:57 AM    GLUCOSE 102 (H) 03/11/2023 11:57 AM    BUN 13 03/11/2023 11:57 AM    CREATININE 0.73 03/11/2023 11:57 AM         RADIOLOGY DATA:  MR-BRAIN-WITH & W/O    Result Date: 6/2/2023  Stable appearance of extensive  T2 signal abnormality involving the bilateral periventricular and deep white matter as well as central pontine white matter. Stable appearance of areas of encephalomalacia in the left inferior frontal region, left thalamus and right medial cerebellum. No abnormal intracranial enhancement to suggest metastatic disease.    MA-SCREENING MAMMO BILAT W/TOMOSYNTHESIS W/CAD    Result Date: 4/18/2023  1.  Breasts are heterogeneously dense, which may obscure small masses. No gross evidence of malignancy and no interval change. 2.  Screening mammogram in one year is recommended. R2 - CATEGORY 2: BENIGN FINDING(S) Ten to twenty percent of all cancers can be categorized as hereditary and the clinical and financial value of identifying patients and families at risk is well documented. If you have a personal or family history of breast, ovarian, fallopian tube, peritoneal or other cancer, please consult your physician regarding genetic counseling and testing.       IMPRESSION:    A 78 y.o. with   Small cell lung cancer (HCC)  Staging form: Lung, AJCC 8th Edition  - Clinical stage from 5/18/2020: Stage IIB (cT1c, cN1, cM0) - Signed by William Cruz M.D. on 5/18/2020  Type of lung cancer: Small cell lung cancer      CANCER STATUS:  No Evidence of Disease    RECOMMENDATIONS:   Patient doing well with no evidence of disease recurrence recommend repeat MRI in 6 months as well as CT chest for surveillance.    Thank you for the opportunity to participate in her care.  If any questions or comments, please do not hesitate in calling.    Orders Placed This Encounter    DULoxetine (CYMBALTA) 60 MG Cap DR Particles delayed-release capsule

## 2023-06-08 ENCOUNTER — TELEPHONE (OUTPATIENT)
Dept: RADIATION ONCOLOGY | Facility: MEDICAL CENTER | Age: 78
End: 2023-06-08
Payer: MEDICARE

## 2023-06-08 DIAGNOSIS — C34.90 SMALL CELL LUNG CANCER (HCC): ICD-10-CM

## 2023-06-08 NOTE — TELEPHONE ENCOUNTER
----- Message from Ivone Camacho sent at 6/7/2023  3:29 PM PDT -----  Regarding: Lab order for scheduled CT  Good afternoon, Dr. Cruz,    I just got Emily scheduled for her CT exam in September. She will need labs on file within 60 days of her appointment on 9/11/23. If you could please order either Creatinine, GFR, or CMP for her at your earliest convenience, she'd like for them to be on file so that she can go to do them anytime after 7/11/23.     Thank you and have a great day,  Martine Wis[facility] Scheduling

## 2023-06-20 ENCOUNTER — OFFICE VISIT (OUTPATIENT)
Dept: PHYSICAL MEDICINE AND REHAB | Facility: MEDICAL CENTER | Age: 78
End: 2023-06-20
Payer: COMMERCIAL

## 2023-06-20 VITALS
SYSTOLIC BLOOD PRESSURE: 118 MMHG | WEIGHT: 145.8 LBS | HEART RATE: 93 BPM | BODY MASS INDEX: 25.83 KG/M2 | OXYGEN SATURATION: 95 % | HEIGHT: 63 IN | DIASTOLIC BLOOD PRESSURE: 64 MMHG | TEMPERATURE: 96.8 F

## 2023-06-20 DIAGNOSIS — M16.0 PRIMARY OSTEOARTHRITIS OF BOTH HIPS: ICD-10-CM

## 2023-06-20 DIAGNOSIS — M79.18 CHRONIC MUSCULOSKELETAL PAIN: ICD-10-CM

## 2023-06-20 DIAGNOSIS — G89.29 CHRONIC MUSCULOSKELETAL PAIN: ICD-10-CM

## 2023-06-20 PROCEDURE — 3074F SYST BP LT 130 MM HG: CPT | Performed by: PHYSICAL MEDICINE & REHABILITATION

## 2023-06-20 PROCEDURE — 1125F AMNT PAIN NOTED PAIN PRSNT: CPT | Performed by: PHYSICAL MEDICINE & REHABILITATION

## 2023-06-20 PROCEDURE — 99214 OFFICE O/P EST MOD 30 MIN: CPT | Performed by: PHYSICAL MEDICINE & REHABILITATION

## 2023-06-20 PROCEDURE — 3078F DIAST BP <80 MM HG: CPT | Performed by: PHYSICAL MEDICINE & REHABILITATION

## 2023-06-20 RX ORDER — MELOXICAM 7.5 MG/1
7.5-15 TABLET ORAL
Qty: 60 TABLET | Refills: 0 | Status: SHIPPED | OUTPATIENT
Start: 2023-06-20 | End: 2023-07-20

## 2023-06-20 RX ORDER — DULOXETIN HYDROCHLORIDE 60 MG/1
60 CAPSULE, DELAYED RELEASE ORAL DAILY
Qty: 30 CAPSULE | Refills: 5 | Status: SHIPPED | OUTPATIENT
Start: 2023-06-20 | End: 2023-12-17

## 2023-06-20 ASSESSMENT — PATIENT HEALTH QUESTIONNAIRE - PHQ9
5. POOR APPETITE OR OVEREATING: 0 - NOT AT ALL
SUM OF ALL RESPONSES TO PHQ QUESTIONS 1-9: 4
CLINICAL INTERPRETATION OF PHQ2 SCORE: 1

## 2023-06-20 ASSESSMENT — PAIN SCALES - GENERAL: PAINLEVEL: 7=MODERATE-SEVERE PAIN

## 2023-06-20 NOTE — PROGRESS NOTES
"Follow-up patient note    Physiatry (physical medicine and  Rehabilitation), interventional spine and sports medicine    Date of Service: 06/20/2023    Chief complaint:   Chief Complaint   Patient presents with    Follow-Up     Med check follow up           HISTORY    HPI: Emily Barnes 78 y.o. female who presents today for follow-up evaluation of pain in her right shoulder and pain in her right hip.    Emily reports that she has worsened pain in the right hip.  She stopped taking her duloxetine two weeks ago as she ran out.  The duloxetine did not eliminate the pain, but \"dampened the pain\" to the point that she can tolerate it.    She fell about 2 weeks ago, tripped on her right foot/rubber soled shoe and fell.  Injured her right wrist.  She did hit her head, but did not lose consciousness.    She has been having a sinus headache for four days.        Medical records review:  I reviewed the note from the referring provider William Cruz M.D.    Previous treatments:    Physical Therapy: No    Medications the patient is tried: NSAIDs    Previous interventions: none    Previous surgeries to relieve the above pain:  none      ROS:   : urinary urgency at times  Red Flags ROS:   Fever, Chills, Sweats: Denies  Involuntary Weight Loss: Denies  Bladder Incontinence: Denies  Bowel Incontinence: Denies  Saddle Anesthesia: Denies    All other systems reviewed and negative.       PMHx:   Past Medical History:   Diagnosis Date    Anemia 1962    Breast cancer (HCC)     Cancer (HCC) 2011    right breast    Cancer (HCC) 2020    lung     COPD (chronic obstructive pulmonary disease) (HCC)     Cough     Dental disorder     upper and lower dentures    EMPHYSEMA     High cholesterol     Pain     occasional breast pain    Pneumonia 1948    Renal disorder 1997    hx of stones    Renal disorder     stones and cyst; 4/29/20 pt reports unaware of renal cyst     S/P radiation therapy     for breast cancer 2011 @ [facility] Dr. " Weed    Small cell lung cancer (HCC)     Snoring     Sputum production     Urinary incontinence        PSHx:   Past Surgical History:   Procedure Laterality Date    MT DRAIN/INJECT LARGE JOINT/BURSA Right 8/4/2022    Procedure: RIGHT hip intra-articular injection. Patient reports emesis with iodine, use non- iodine.;  Surgeon: Riley Phipps M.D.;  [facility];  Service: Pain Management    MT BRONCHOSCOPY,DIAGNOSTIC  5/7/2020    Procedure: BRONCHOSCOPY-FIBER OPTIC WITH POSSIBLE WASH, BRUSH, BROCHOALVEOLAR LAVAGE, BIOPSY FINE NEEDLE ASPIRATION;  Surgeon: Peter Foster M.D.;  Location: SURGERY [facility];  Service: Pulmonary    ENDOBRONCHIAL US ADD-ON  5/7/2020    Procedure: ENDOBRONCHIAL ULTRASOUND (EBUS);  Surgeon: Peter Foster M.D.;  Location: [facility];  Service: Pulmonary    COLONOSCOPY WITH BIOPSY  3/15/2012    Performed by RC FANG at [facility]    BREAST BIOPSY  8/23/2011    Performed by MOLLY PARRA at SURGERY [facility] DAY [facility]    NODE BIOPSY SENTINEL  8/8/2011    Performed by MOLLY PARRA at SUR[facility]ERY SAME DAY [facility]    LUMPECTOMY  8/8/2011    Performed by MOLLY PARRA at [facility]ERY SAME DAY [facility]    TONSILLECTOMY  1972    MT RADIATION THERAPY PLAN SIMPLE         Family history   Family History   Problem Relation Age of Onset    Stroke Other     Heart Disease Other     Cancer Mother         lung cancer    Heart Disease Father     Cancer Sister         lung cancer    Cancer Sister         lung cancer    Cancer Sister         unknown cancer         Medications:   Current Outpatient Medications   Medication    DULoxetine (CYMBALTA) 60 MG Cap DR Particles delayed-release capsule    meloxicam (MOBIC) 7.5 MG Tab    atorvastatin (LIPITOR) 10 MG Tab    lidocaine-prilocaine (EMLA) 2.5-2.5 % Cream    Cholecalciferol (VITAMIN D) 50 MCG (2000 UT) Cap    Pseudoeph-Doxylamine-DM-APAP (NYQUIL PO)     No current  "facility-administered medications for this visit.       Allergies:   Allergies   Allergen Reactions    Flu Virus Vaccine Hives    Skin Adhesives Rash     reddness       Social Hx:   Social History     Socioeconomic History    Marital status:      Spouse name: Not on file    Number of children: Not on file    Years of education: Not on file    Highest education level: Not on file   Occupational History    Not on file   Tobacco Use    Smoking status: Every Day     Packs/day: 2.00     Years: 58.00     Pack years: 116.00     Types: Cigarettes     Start date: 1962    Smokeless tobacco: Never   Vaping Use    Vaping Use: Never used   Substance and Sexual Activity    Alcohol use: Yes     Comment: 4 per week    Drug use: No    Sexual activity: Not on file   Other Topics Concern    Not on file   Social History Narrative    ** Merged History Encounter **          Social Determinants of Health     Financial Resource Strain: Not on file   Food Insecurity: Unknown (5/15/2020)    Hunger Vital Sign     Worried About Running Out of Food in the Last Year: Patient refused     Ran Out of Food in the Last Year: Patient refused   Transportation Needs: Not on file   Physical Activity: Not on file   Stress: Not on file   Social Connections: Not on file   Intimate Partner Violence: Not on file   Housing Stability: Not on file         EXAMINATION     Physical Exam:   Vitals: /64 (BP Location: Right arm, Patient Position: Sitting, BP Cuff Size: Adult)   Pulse 93   Temp 36 °C (96.8 °F) (Temporal)   Ht 1.6 m (5' 3\")   Wt 66.1 kg (145 lb 12.8 oz)   SpO2 95%     Constitutional:   Body Habitus: Body mass index is 25.83 kg/m².  Cooperation: Fully cooperates with exam  Appearance: Well-groomed, well-nourished, not disheveled, in no acute distress    Eyes: No scleral icterus, no proptosis     ENT -no obvious auditory deficits, wearing a face mask, CN II-XII intact    Skin -no rashes or lesions noted, no bruising or ecchymosis on " the face    Respiratory-  breathing comfortable on room air, no audible wheezing    Cardiovascular- No lower extremity edema is noted.     Psychiatric- alert and oriented ×3. Normal affect.     Gait - normal gait, no use of ambulatory device, Trendelenburg gait    Musculoskeletal  Decreased ROM of the right hip.  Functional ROM of the left hip    No focal motor or sensory deficits in the lower extremities bilaterally  Reflexes 2+ patella and Achilles bilaterally    MEDICAL DECISION MAKING    Medical records review: see under HPI section.     DATA    Labs:   Lab Results   Component Value Date/Time    SODIUM 139 03/11/2023 11:57 AM    POTASSIUM 4.6 03/11/2023 11:57 AM    CHLORIDE 102 03/11/2023 11:57 AM    CO2 22 03/11/2023 11:57 AM    ANION 15.0 03/11/2023 11:57 AM    GLUCOSE 102 (H) 03/11/2023 11:57 AM    BUN 13 03/11/2023 11:57 AM    CREATININE 0.73 03/11/2023 11:57 AM    CALCIUM 9.5 03/11/2023 11:57 AM    ASTSGOT 18 06/06/2021 04:56 PM    ALTSGPT 11 06/06/2021 04:56 PM    TBILIRUBIN 0.3 06/06/2021 04:56 PM    ALBUMIN 3.6 06/06/2021 04:56 PM    TOTPROTEIN 6.2 06/06/2021 04:56 PM    GLOBULIN 2.6 06/06/2021 04:56 PM    AGRATIO 1.4 06/06/2021 04:56 PM       No results found for: PROTHROMBTM, INR     Lab Results   Component Value Date/Time    WBC 4.5 (L) 06/06/2021 04:56 PM    RBC 4.18 (L) 06/06/2021 04:56 PM    HEMOGLOBIN 13.1 06/06/2021 04:56 PM    HEMATOCRIT 40.7 06/06/2021 04:56 PM    MCV 97.4 06/06/2021 04:56 PM    MCH 31.3 06/06/2021 04:56 PM    MCHC 32.2 (L) 06/06/2021 04:56 PM    MPV 9.7 06/06/2021 04:56 PM    NEUTSPOLYS 73.90 (H) 06/06/2021 04:56 PM    LYMPHOCYTES 18.60 (L) 06/06/2021 04:56 PM    MONOCYTES 6.70 06/06/2021 04:56 PM    EOSINOPHILS 0.20 06/06/2021 04:56 PM    BASOPHILS 0.20 06/06/2021 04:56 PM        No results found for: HBA1C     Imaging: I personally reviewed following images, these are my reads  Xray right shoulder 06/06/2022  Moderate osteoarthritis glenohumeral and AC joints    Xray right  knee 06/06/2022  Mild hip osteoarthritis    Xray hips 06/06/2022  Moderate hip osteoarthritis bilaterally    CT chest abdomen pelvis June 5, 2020  Reviewed for orthopedic issues.  There are degenerative changes in the hips bilaterally.  Degenerative changes in the lumbar spine greatest at L4-5 with decreased disc height and osteophytes noted.    IMAGING radiology reads. I reviewed the following radiology reads   Xray hips 06/06/2022    IMPRESSION:  Moderate osteoarthritis of the bilateral hip joints.      Xray right shoulder 06/06/2022  FINDINGS:     No acute fracture or dislocation.  Moderate osteoarthritis of the right glenohumeral and AC joints.     IMPRESSION:  1. No acute osseous abnormality.      Xray right knee 06/06/2022  IMPRESSION:  1. No acute osseous abnormality.                                                    Diagnosis   Visit Diagnoses     ICD-10-CM   1. Primary osteoarthritis of both hips  M16.0   2. Chronic musculoskeletal pain  M79.18    G89.29               ASSESSMENT:  Emily Barnes 78 y.o. female seen for above     Emily was seen today for follow-up.    Diagnoses and all orders for this visit:    Primary osteoarthritis of both hips  -     DULoxetine (CYMBALTA) 60 MG Cap DR Particles delayed-release capsule; Take 1 Capsule by mouth every day for 180 days.  -     meloxicam (MOBIC) 7.5 MG Tab; Take 1-2 Tablets by mouth 1 time a day as needed for Severe Pain for up to 30 days. Take with food.    Chronic musculoskeletal pain  -     DULoxetine (CYMBALTA) 60 MG Cap DR Particles delayed-release capsule; Take 1 Capsule by mouth every day for 180 days.  -     meloxicam (MOBIC) 7.5 MG Tab; Take 1-2 Tablets by mouth 1 time a day as needed for Severe Pain for up to 30 days. Take with food.      Recommend follow-up with her PCP or urgent care regarding her sinus headache.  No concerns for acute mental status changes.  She is not taking anticoagulation.    Resume duloxetine 60mg daily.  Refill given  for the next 6 months.  This can be managed by her PCP in the future.  Discussed possible repeat right hip injection.  She would like to hold off and favors resuming the duloxetine.  Activity as tolerated.        Follow-up: Return if symptoms worsen or fail to improve.      Thank you very much for asking me to participate in Emily Barnes's care.  Please contact me with any questions or concerns.    Please note that this dictation was created using voice recognition software. I have made every reasonable attempt to correct obvious errors but there may be errors of grammar and content that I may have overlooked prior to finalization of this note.      Riley Phipps MD  Physical Medicine and Rehabilitation  Interventional Spine and Sports Physiatry  [facility]    CC: Osei Jc MD

## 2023-09-07 ENCOUNTER — HOSPITAL ENCOUNTER (OUTPATIENT)
Dept: LAB | Facility: MEDICAL CENTER | Age: 78
End: 2023-09-07
Attending: RADIOLOGY
Payer: COMMERCIAL

## 2023-09-07 DIAGNOSIS — C34.90 SMALL CELL LUNG CANCER (HCC): ICD-10-CM

## 2023-09-07 LAB
ANION GAP SERPL CALC-SCNC: 10 MMOL/L (ref 7–16)
BUN SERPL-MCNC: 20 MG/DL (ref 8–22)
CALCIUM SERPL-MCNC: 9.4 MG/DL (ref 8.5–10.5)
CHLORIDE SERPL-SCNC: 105 MMOL/L (ref 96–112)
CO2 SERPL-SCNC: 26 MMOL/L (ref 20–33)
CREAT SERPL-MCNC: 0.71 MG/DL (ref 0.5–1.4)
GFR SERPLBLD CREATININE-BSD FMLA CKD-EPI: 87 ML/MIN/1.73 M 2
GLUCOSE SERPL-MCNC: 107 MG/DL (ref 65–99)
POTASSIUM SERPL-SCNC: 4.3 MMOL/L (ref 3.6–5.5)
SODIUM SERPL-SCNC: 141 MMOL/L (ref 135–145)

## 2023-09-07 PROCEDURE — 36415 COLL VENOUS BLD VENIPUNCTURE: CPT

## 2023-09-07 PROCEDURE — 80048 BASIC METABOLIC PNL TOTAL CA: CPT

## 2023-09-11 ENCOUNTER — HOSPITAL ENCOUNTER (OUTPATIENT)
Dept: RADIOLOGY | Facility: MEDICAL CENTER | Age: 78
End: 2023-09-11
Attending: RADIOLOGY
Payer: COMMERCIAL

## 2023-09-11 DIAGNOSIS — C34.90 SMALL CELL LUNG CANCER (HCC): ICD-10-CM

## 2023-09-11 PROCEDURE — 700117 HCHG RX CONTRAST REV CODE 255: Performed by: RADIOLOGY

## 2023-09-11 PROCEDURE — 71260 CT THORAX DX C+: CPT

## 2023-09-11 RX ADMIN — IOHEXOL 75 ML: 350 INJECTION, SOLUTION INTRAVENOUS at 13:33

## 2023-12-04 ENCOUNTER — HOSPITAL ENCOUNTER (OUTPATIENT)
Dept: RADIOLOGY | Facility: MEDICAL CENTER | Age: 78
End: 2023-12-04
Attending: RADIOLOGY
Payer: COMMERCIAL

## 2023-12-04 DIAGNOSIS — C34.90 SMALL CELL LUNG CANCER (HCC): ICD-10-CM

## 2023-12-04 PROCEDURE — 700117 HCHG RX CONTRAST REV CODE 255: Performed by: RADIOLOGY

## 2023-12-04 PROCEDURE — A9579 GAD-BASE MR CONTRAST NOS,1ML: HCPCS | Performed by: RADIOLOGY

## 2023-12-04 PROCEDURE — 70553 MRI BRAIN STEM W/O & W/DYE: CPT

## 2023-12-04 RX ADMIN — GADOTERIDOL 15 ML: 279.3 INJECTION, SOLUTION INTRAVENOUS at 10:08

## 2024-02-13 ENCOUNTER — HOSPITAL ENCOUNTER (OUTPATIENT)
Dept: RADIATION ONCOLOGY | Facility: MEDICAL CENTER | Age: 79
End: 2024-02-29
Attending: RADIOLOGY
Payer: COMMERCIAL

## 2024-02-13 VITALS
HEART RATE: 74 BPM | SYSTOLIC BLOOD PRESSURE: 183 MMHG | WEIGHT: 154.32 LBS | DIASTOLIC BLOOD PRESSURE: 84 MMHG | OXYGEN SATURATION: 98 % | TEMPERATURE: 97.8 F | BODY MASS INDEX: 27.34 KG/M2

## 2024-02-13 DIAGNOSIS — C34.90 SMALL CELL LUNG CANCER (HCC): ICD-10-CM

## 2024-02-13 PROCEDURE — 99212 OFFICE O/P EST SF 10 MIN: CPT | Performed by: RADIOLOGY

## 2024-02-13 PROCEDURE — 99213 OFFICE O/P EST LOW 20 MIN: CPT | Performed by: RADIOLOGY

## 2024-02-13 ASSESSMENT — PAIN SCALES - GENERAL: PAINLEVEL: NO PAIN

## 2024-02-13 NOTE — PROGRESS NOTES
RADIATION ONCOLOGY FOLLOW-UP    Patient name:  Emily Barnes    Primary Physician:  Osei CABRERA M.D. MRN: [id number]  [facility]: [id number]   Referring physician:  Peter Foster M.D.  : 1945, 78 y.o.     DATE OF SERVICE: 2024    IDENTIFICATION:   A 78 y.o. female with    Small cell lung cancer (HCC)  Staging form: Lung, AJCC 8th Edition  - Clinical stage from 2020: Stage IIB (cT1c, cN1, cM0) - Signed by William Cruz M.D. on 2020  Type of lung cancer: Small cell lung cancer      RADIATION SUMMARY:  Radiation Oncology          2020    07:42   Aria Course Treatment Dates   Course First Treatment Date  2020    Course Last Treatment Date  2020    Aria Treatment Summary   R_Lung_Nodes  Plan from Course C2_SCLC   Fraction 29 of 30    30 of 30    30 of 30    Elapsed Course Days  @     18 @     18 @ [id number]    Prescribed Fraction Dose 150 cGy    150 cGy    150 cGy    Prescribed Total Dose 4,500 cGy    4,500 cGy    4,500 cGy    R_Lung_Nodes  Reference Point from Course C2_SCLC   Elapsed Course Days  @     18 @ [id number]    18 @ [id number]    Session Dose 150 cGy    150 cGy    --    Total Dose 4,350 cGy    4,500 cGy    4,500 cGy    R_Lung_Nodes CP  Reference Point from Course C2_SCLC   Elapsed Course Days  @     18 @ [id number]    Session Dose 155 cGy    155 cGy    --    Total Dose 4,482 cGy    4,636 cGy    4,636 cGy       Details          More values are hidden. Newest values shown. Go to activity for more data.                HISTORY OF PRESENT ILLNESS:  Subjective      Patient originally presented with abnormal CT scan finding in 2018 from her breast cancer surveillance.  It was grossly stable until 2019 which showed slight increase in size of right hilar bunny tissue now measuring 2.4 x 1.5 cm with no evidence of primary small cell  lung cancer.  Patient had her screening mammogram December 31, 2019 which showed no evidence of latency.  Patient was seen by Dr. Crain in pulmonary who ordered a PET CT scan in April 6, 2020 which showed FDG avid right hilar lymph node concerning for metastatic disease.  Patient underwent NIKOLAS guided biopsy by Dr. Foster with 11 R and 10 R sampled showing small cell lung cancer TTF-1 positive synaptophysin positive and chromogranin positive with KATHERIN-3 negative.  MRI brain was performed today May 18, 2020 and per my read shows no evidence of brain metastasis.  She is currently asymptomatic without any pain and is not on oxygen she denies any shortness of breath but does have a clear cough with no hemoptysis.           11/6/20  Called patient to order restaging MR brain and CT chest with contrast. Completed chemoradiation for limited stage small cell lung cancer, but declined further chemo or PCI to brain.     1/8/21  Patient doing well with increased energy and appetite. CT chest and MRI brain shows shrinkage of right hilar mass small 3 mm left upper lobe nodule nonspecific and no evidence of brain metastasis.  She denies any cough, shortness of breath or hemoptysis or weight loss.     4/12/21  Patient is doing well.  She has no evidence of CNS relapse in her CT chest today shows 2.5 mm right upper lobe nodule unchanged and right hilar lymph node measuring 5.6 x 8.8 mm previously 10 x 15 mm.  No other evidence of disease.  She is doing well overall she did have some nausea with the contrast administration.  She denies any weight loss or hemoptysis     10/14/21  Patient is doing well had MRI brain October 2021 which showed no evidence of metastasis.  I have ordered a repeat CT chest abdomen pelvis scan today to evaluate the rest of her body.  Patient denies any cough shortness of breath or weight loss.     5/2021  Patient doing well had MRI brain which showed no evidence of disease recurrence.  She has some joint  pain especially in the right shoulder and right knee.  She did have bone scan which showed no evidence of bone metastasis but significant arthritis.        06/03/22  Patient is doing well she did have restaging CT chest abdomen pelvis scan June 1, 2022 which showed overall stability however new 2.5 cm groundglass opacity of the complex of the right major and minor fissure while likely infectious or inflammatory should be closely followed.  No new lesions or lymphadenopathy in chest and pelvis.  Overall patient feeling well she still has arthritis that is seeing a physiatrist for this        9/12/2022  Patient doing well had repeat CT chest abdomen pelvis September 2020 which shows no evidence of disease recurrence.  She has MRI brain ordered for November 9, 2022.  She denies any headaches, nausea, vomiting or other neurologic symptoms.  She denies any cough shortness of breath or hemoptysis or weight loss.     6/5/23  Patient doing well had MRI brain June 1, 2023 which showed no evidence of disease and CT chest abdomen pelvis March 13, 2023 which showed no evidence of disease recurrence.  Denies any headaches, nausea or vomiting.  Did have a fall recently.        INTERVAL HISTORY:  Patient doing well had recent MRI brain 12/4/2023 which shows no evidence of disease recurrence as well as CT chest September 11, 2023.  Denies any headaches, nausea vomiting or hemoptysis or weight loss.  Main issue is his cataract related issues as well as arthritis in hips.    PROBLEM LIST:  Patient Active Problem List   Diagnosis    Pulmonary nodule/R hilar mass    Breast cancer     Small cell lung cancer (HCC)    COPD with asthma    Risk for falls       CURRENT MEDICATIONS:  Current Outpatient Medications   Medication Sig Dispense Refill    lidocaine-prilocaine (EMLA) 2.5-2.5 % Cream APPLY A SMALL AMOUNT OF CREAM TO PORT SITE AN HOUR PRIOR TO TREATMENT (Patient not taking: Reported on 6/20/2023)      Cholecalciferol (VITAMIN D) 50  MCG (2000 UT) Cap Take 4,000 Units by mouth every day. (Patient not taking: Reported on 6/20/2023)      Pseudoeph-Doxylamine-DM-APAP (NYQUIL PO) Take  by mouth as needed. Takes prn for sleep approx twice a month (Patient not taking: Reported on 10/14/2022)      atorvastatin (LIPITOR) 10 MG Tab Take 10 mg by mouth every day. Takes in the afternoon       No current facility-administered medications for this encounter.       ALLERGIES:  Flu virus vaccine and Skin adhesives    REVIEW OF SYSTEMS:    A complete review of system taken. Pertinent items in HPI.  All others negative.    PHYSICAL EXAM:  PERFORMANCE STATUS:  ECOG0    BP (!) 183/84 (BP Location: Right arm, Patient Position: Sitting, BP Cuff Size: Adult)   Pulse 74   Temp 36.6 °C (97.8 °F) (Temporal)   Wt 70 kg (154 lb 5.2 oz)   SpO2 98%   BMI 27.34 kg/m²   Physical Exam  Vitals reviewed.   Eyes:      Pupils: Pupils are equal, round, and reactive to light.   Cardiovascular:      Rate and Rhythm: Normal rate.   Pulmonary:      Effort: Pulmonary effort is normal.   Abdominal:      General: Abdomen is flat.   Musculoskeletal:         General: Normal range of motion.   Neurological:      General: No focal deficit present.      Mental Status: She is alert.   Psychiatric:         Mood and Affect: Mood normal.         LABORATORY DATA:   Lab Results   Component Value Date/Time    WBC 4.5 (L) 06/06/2021 04:56 PM    RBC 4.18 (L) 06/06/2021 04:56 PM    HEMOGLOBIN 13.1 06/06/2021 04:56 PM    HEMATOCRIT 40.7 06/06/2021 04:56 PM    MCV 97.4 06/06/2021 04:56 PM    MCH 31.3 06/06/2021 04:56 PM    MCHC 32.2 (L) 06/06/2021 04:56 PM    RDW 50.7 (H) 06/06/2021 04:56 PM    PLATELETCT 180 06/06/2021 04:56 PM    MPV 9.7 06/06/2021 04:56 PM    NEUTSPOLYS 73.90 (H) 06/06/2021 04:56 PM    LYMPHOCYTES 18.60 (L) 06/06/2021 04:56 PM    MONOCYTES 6.70 06/06/2021 04:56 PM    EOSINOPHILS 0.20 06/06/2021 04:56 PM    BASOPHILS 0.20 06/06/2021 04:56 PM      Lab Results   Component Value  Date/Time    SODIUM 141 09/07/2023 02:26 PM    POTASSIUM 4.3 09/07/2023 02:26 PM    CHLORIDE 105 09/07/2023 02:26 PM    CO2 26 09/07/2023 02:26 PM    GLUCOSE 92 11/30/2023 01:58 PM    GLUCOSE 107 (H) 09/07/2023 02:26 PM    BUN 20 09/07/2023 02:26 PM    CREATININE 0.71 09/07/2023 02:26 PM         RADIOLOGY DATA:  No results found.    IMPRESSION:    A 78 y.o. with   Small cell lung cancer (HCC)  Staging form: Lung, AJCC 8th Edition  - Clinical stage from 5/18/2020: Stage IIB (cT1c, cN1, cM0) - Signed by William Cruz M.D. on 5/18/2020  Type of lung cancer: Small cell lung cancer      CANCER STATUS:  No Evidence of Disease    RECOMMENDATIONS:   Patient doing well recommend MRI brain and CT chest in 6 months with follow-up at that time.    Thank you for the opportunity to participate in her care.  If any questions or comments, please do not hesitate in calling.    No orders of the defined types were placed in this encounter.

## 2024-02-13 NOTE — PROGRESS NOTES
Patient was seen today in clinic with Dr. Cruz for FOLLOW UP.  Vitals signs and weight were obtained and pain assessment was completed.  Allergies and medications were reviewed with the patient.      Vitals/Pain:  Vitals:    02/13/24 1057   BP: (!) 183/84   BP Location: Right arm   Patient Position: Sitting   BP Cuff Size: Adult   Pulse: 74   Temp: 36.6 °C (97.8 °F)   TempSrc: Temporal   SpO2: 98%   Weight: 70 kg (154 lb 5.2 oz)   Pain Score: No pain        Allergies:   Flu virus vaccine and Skin adhesives    Current Medications:  Current Outpatient Medications   Medication Sig Dispense Refill    lidocaine-prilocaine (EMLA) 2.5-2.5 % Cream APPLY A SMALL AMOUNT OF CREAM TO PORT SITE AN HOUR PRIOR TO TREATMENT (Patient not taking: Reported on 6/20/2023)      Cholecalciferol (VITAMIN D) 50 MCG (2000 UT) Cap Take 4,000 Units by mouth every day. (Patient not taking: Reported on 6/20/2023)      Pseudoeph-Doxylamine-DM-APAP (NYQUIL PO) Take  by mouth as needed. Takes prn for sleep approx twice a month (Patient not taking: Reported on 10/14/2022)      atorvastatin (LIPITOR) 10 MG Tab Take 10 mg by mouth every day. Takes in the afternoon       No current facility-administered medications for this encounter.           Jany Sánchez, Med Ass't

## 2024-04-22 ENCOUNTER — HOSPITAL ENCOUNTER (OUTPATIENT)
Dept: RADIOLOGY | Facility: MEDICAL CENTER | Age: 79
End: 2024-04-22
Attending: INTERNAL MEDICINE
Payer: COMMERCIAL

## 2024-04-29 ENCOUNTER — APPOINTMENT (OUTPATIENT)
Dept: RADIOLOGY | Facility: MEDICAL CENTER | Age: 79
End: 2024-04-29
Attending: RADIOLOGY
Payer: COMMERCIAL

## 2024-05-07 DIAGNOSIS — C34.90 SMALL CELL LUNG CANCER (HCC): ICD-10-CM

## 2024-05-13 ENCOUNTER — HOSPITAL ENCOUNTER (OUTPATIENT)
Dept: RADIOLOGY | Facility: MEDICAL CENTER | Age: 79
End: 2024-05-13
Attending: INTERNAL MEDICINE
Payer: COMMERCIAL

## 2024-05-13 ENCOUNTER — HOSPITAL ENCOUNTER (OUTPATIENT)
Dept: RADIOLOGY | Facility: MEDICAL CENTER | Age: 79
End: 2024-05-13
Attending: RADIOLOGY
Payer: COMMERCIAL

## 2024-05-13 DIAGNOSIS — Z12.31 ENCOUNTER FOR SCREENING MAMMOGRAM FOR BREAST CANCER: ICD-10-CM

## 2024-05-13 DIAGNOSIS — C34.90 SMALL CELL LUNG CANCER (HCC): ICD-10-CM

## 2024-05-13 RX ADMIN — IOHEXOL 75 ML: 350 INJECTION, SOLUTION INTRAVENOUS at 13:42

## 2024-06-24 ENCOUNTER — OFFICE VISIT (OUTPATIENT)
Dept: SLEEP MEDICINE | Facility: MEDICAL CENTER | Age: 79
End: 2024-06-24
Attending: INTERNAL MEDICINE
Payer: COMMERCIAL

## 2024-06-24 VITALS
HEIGHT: 63 IN | SYSTOLIC BLOOD PRESSURE: 142 MMHG | OXYGEN SATURATION: 91 % | HEART RATE: 88 BPM | DIASTOLIC BLOOD PRESSURE: 76 MMHG | BODY MASS INDEX: 26.05 KG/M2 | WEIGHT: 147 LBS

## 2024-06-24 DIAGNOSIS — J44.89 COPD WITH ASTHMA (HCC): ICD-10-CM

## 2024-06-24 DIAGNOSIS — R91.8 PULMONARY NODULES: ICD-10-CM

## 2024-06-24 DIAGNOSIS — Z72.0 TOBACCO ABUSE: ICD-10-CM

## 2024-06-24 PROCEDURE — 99204 OFFICE O/P NEW MOD 45 MIN: CPT | Mod: 25 | Performed by: INTERNAL MEDICINE

## 2024-06-24 PROCEDURE — 94761 N-INVAS EAR/PLS OXIMETRY MLT: CPT | Performed by: INTERNAL MEDICINE

## 2024-06-24 PROCEDURE — 3077F SYST BP >= 140 MM HG: CPT | Performed by: INTERNAL MEDICINE

## 2024-06-24 PROCEDURE — 99212 OFFICE O/P EST SF 10 MIN: CPT | Performed by: INTERNAL MEDICINE

## 2024-06-24 PROCEDURE — 3078F DIAST BP <80 MM HG: CPT | Performed by: INTERNAL MEDICINE

## 2024-06-24 RX ORDER — IBUPROFEN 200 MG
200 TABLET ORAL EVERY 6 HOURS PRN
COMMUNITY

## 2024-06-24 ASSESSMENT — ENCOUNTER SYMPTOMS
HEADACHES: 0
NAUSEA: 0
DEPRESSION: 0
COUGH: 0
CHILLS: 0
DIZZINESS: 0
ORTHOPNEA: 0
SHORTNESS OF BREATH: 0
WEIGHT LOSS: 0
FEVER: 0
VOMITING: 0
SPUTUM PRODUCTION: 0

## 2024-06-24 ASSESSMENT — PATIENT HEALTH QUESTIONNAIRE - PHQ9: CLINICAL INTERPRETATION OF PHQ2 SCORE: 0

## 2024-06-24 NOTE — ASSESSMENT & PLAN NOTE
Highly concerning that this is recurrent small cell carcinoma  Discussed with patient that if she did not want to pursue chemotherapy, that pursuing a biopsy may not be the best option.  She said that she would like to pursue the biopsy and then explore her treatment options  Plan for Ion and EBUS

## 2024-06-24 NOTE — ASSESSMENT & PLAN NOTE
By PFTs  Patient denies any symptoms at this time  Multi-ox today the patient desaturated to 88% with ambulation

## 2024-06-24 NOTE — PROGRESS NOTES
Pulmonary Clinic- Initial Consult    Date of Service: 6/24/2024    Referring Physician: Keisha Alcantar M.D.    Reason for Consult: PET avid lung nodules     Chief Complaint:   Chief Complaint   Patient presents with    Establish Care     Referred by Dr Alcantar for Malignant neoplasm of upper lobe, right bronchus or lung    Other     PET-CT 06/10/24, CT-Chest 05/13/24     HPI:   Emily Barnes is a 79 y.o. female who is referred to the pulmonary clinic for pulmonary nodules and hilar, subcarinal adenopathy.  Patient has a history of stage IIa right breast cancer status post partial mastectomy and radiation therapy, completed in 2011.  She refused adjuvant chemotherapy.  She has been on anastrozole since that time.  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  She underwent 1 round of chemotherapy and refused any further chemotherapy.  She did continue with radiation therapy, she has been following radiation oncology since that time.  She had imaging done in March 2023 which showed no evidence of ongoing disease, however, CT from 5/13/2024 showed increasing right hilar lymphadenopathy and right lower lobe nodules.  She subsequently underwent a PET scan on 6/10/2024 showed that these areas were hypermetabolic.  Patient states that she would adamantly refuse chemotherapy, but would be open to completing radiation therapy again.  I also discussed with her that we would have to see if she be a candidate for immunotherapy.  Patient continues to smoke 1 pack/cigarettes/day.  At this time, she is not ready to quit.  Denies any infectious symptoms.  Denies any cough, shortness of breath.  She is saturating 91% today, and desaturates to 88% with ambulation.      Functional status:  MMRC Grade:   0: Breathless only during strenuous exercise  1: Short of breath when hurrying or going up a small hill  2: Walks slower than friends due to breathlessness, has to stop at own pace  3:  Stops to catch breath on level ground after 100m  4: Breathless with ambulating around house or ADLs    Past Medical History:   Diagnosis Date    Anemia 1962    Breast cancer (HCC)     Cancer (HCC) 2011    right breast    Cancer (HCC) 2020    lung     COPD (chronic obstructive pulmonary disease) (HCC)     Cough     Dental disorder     upper and lower dentures    EMPHYSEMA     High cholesterol     Pain     occasional breast pain    Pneumonia 1948    Renal disorder 1997    hx of stones    Renal disorder     stones and cyst; 4/29/20 pt reports unaware of renal cyst     S/P radiation therapy     [facility]    Small cell lung cancer (HCC)     Snoring     Sputum production     Urinary incontinence        Past Surgical History:   Procedure Laterality Date    CO DRAIN/INJECT LARGE JOINT/BURSA Right 8/4/2022    Procedure: RIGHT hip intra-articular injection. Patient reports emesis with iodine, use non- iodine.;  Surgeon: Riley Phipps M.D.;  [facility];  Service: Pain Management    CO BRONCHOSCOPY,DIAGNOSTIC  5/7/2020    Procedure: BRONCHOSCOPY-FIBER OPTIC WITH POSSIBLE WASH, BRUSH, BROCHOALVEOLAR LAVAGE, BIOPSY FINE NEEDLE ASPIRATION;  Surgeon: Peter Foster M.D.;  Location: [facility];  Service: Pulmonary    ENDOBRONCHIAL US ADD-ON  5/7/2020    Procedure: ENDOBRONCHIAL ULTRASOUND (EBUS);  Surgeon: Peter Foster M.D.;  Location: [facility];  Service: Pulmonary    COLONOSCOPY WITH BIOPSY  3/15/2012    Performed by RC FANG at [facility]    BREAST BIOPSY  8/23/2011    Performed by MOLLY PARRA at [facility]    NODE BIOPSY SENTINEL  8/8/2011    Performed by MOLLY PARRA at [facility]    LUMPECTOMY  8/8/2011    Performed by MOLLY PARRA at [facility]ERY [facility] DAY [facility]    TONSILLECTOMY  1972    CO RADIATION THERAPY PLAN SIMPLE         Social History      Socioeconomic History    Marital status:      Spouse name: Not on file    Number of children: Not on file    Years of education: Not on file    Highest education level: Not on file   Occupational History    Not on file   Tobacco Use    Smoking status: Every Day     Current packs/day: 2.00     Average packs/day: 2.0 packs/day for 62.5 years (125.0 ttl pk-yrs)     Types: Cigarettes     Start date: 1962    Smokeless tobacco: Never   Vaping Use    Vaping status: Never Used   Substance and Sexual Activity    Alcohol use: Yes     Comment: 4 per week    Drug use: No    Sexual activity: Not on file   Other Topics Concern    Not on file   Social History Narrative    ** Merged History Encounter **          Social Determinants of Health     Financial Resource Strain: Not on file   Food Insecurity: Unknown (5/15/2020)    Hunger Vital Sign     Worried About Running Out of Food in the Last Year: Patient declined     Ran Out of Food in the Last Year: Patient declined   Transportation Needs: Not on file   Physical Activity: Not on file   Stress: Not on file (2/11/2021)   Social Connections: Not on file   Intimate Partner Violence: Low Risk  (4/13/2021)    Received from [facility]    Intimate Partner Violence     Insults You: Not on file     Threatens You: Not on file     Screams at You: Not on file     Physically Hurt: Not on file     Intimate Partner Violence Score: Not on file   Housing Stability: Not on file          Family History   Problem Relation Age of Onset    Stroke Other     Heart Disease Other     Cancer Mother         lung cancer    Heart Disease Father     Cancer Sister         lung cancer    Cancer Sister         lung cancer    Cancer Sister         unknown cancer       Current Outpatient Medications on File Prior to Visit   Medication Sig Dispense Refill    lidocaine-prilocaine (EMLA) 2.5-2.5 % Cream APPLY A SMALL AMOUNT OF CREAM TO PORT SITE AN HOUR PRIOR TO TREATMENT (Patient not taking: Reported  "on 6/20/2023)      Cholecalciferol (VITAMIN D) 50 MCG (2000 UT) Cap Take 4,000 Units by mouth every day. (Patient not taking: Reported on 6/20/2023)      Pseudoeph-Doxylamine-DM-APAP (NYQUIL PO) Take  by mouth as needed. Takes prn for sleep approx twice a month (Patient not taking: Reported on 10/14/2022)      atorvastatin (LIPITOR) 10 MG Tab Take 10 mg by mouth every day. Takes in the afternoon       No current facility-administered medications on file prior to visit.       Allergies: Flu virus vaccine and Skin adhesives    Review of Systems   Constitutional:  Negative for chills, fever and weight loss.   Respiratory:  Negative for cough, sputum production and shortness of breath.    Cardiovascular:  Negative for chest pain and orthopnea.   Gastrointestinal:  Negative for nausea and vomiting.   Neurological:  Negative for dizziness and headaches.   Psychiatric/Behavioral:  Negative for depression and suicidal ideas.      Vitals:  BP (!) 142/76 (BP Location: Left arm, Patient Position: Sitting, BP Cuff Size: Adult)   Pulse 88   Ht 1.6 m (5' 3\")   Wt 66.7 kg (147 lb)   SpO2 91%     Physical Exam  Vitals and nursing note reviewed.   Constitutional:       Appearance: Normal appearance.   HENT:      Head: Normocephalic and atraumatic.   Eyes:      Conjunctiva/sclera: Conjunctivae normal.   Cardiovascular:      Rate and Rhythm: Normal rate and regular rhythm.   Pulmonary:      Effort: Pulmonary effort is normal.      Breath sounds: Normal breath sounds.   Skin:     General: Skin is warm and dry.   Neurological:      General: No focal deficit present.      Mental Status: She is alert and oriented to person, place, and time. Mental status is at baseline.   Psychiatric:         Mood and Affect: Mood normal.         Behavior: Behavior normal.         Laboratory Data:      Pertinent Studies:    PFTs as reviewed by me personally show:      Imaging as reviewed by me personally show:    CT Chest " 5/13/2024      Echo:      Assessment/Plan:    Problem List Items Addressed This Visit          Pulmonary/Sleep Medicine Problems    COPD with asthma (HCC)     By PFTs  Patient denies any symptoms at this time  Multi-ox today the patient desaturated to 88% with ambulation         Relevant Orders    Multiple Oximetry       Other    Pulmonary nodule/R hilar mass     Highly concerning that this is recurrent small cell carcinoma  Discussed with patient that if she did not want to pursue chemotherapy, that pursuing a biopsy may not be the best option.  She said that she would like to pursue the biopsy and then explore her treatment options  Plan for Ion and EBUS         Relevant Orders    Bronchoscopy    CT-CHEST (THORAX) W/O    Tobacco abuse     Patient continues to smoke 1 pack/day  Smoking cessation advised             Return in about 4 weeks (around 7/22/2024).     This note was generated using voice recognition software which has a chance of producing errors of grammar and possibly content.  I have made every reasonable attempt to find and correct any obvious errors, but it should be expected that some may not be found prior to finalization of this note.    Time spent in record review prior to patient arrival, reviewing results, and in face-to-face encounter totaled 52 min, excluding any procedures if performed.    Mariel Up, DO   Pulmonary and Critical Care  [facility]

## 2024-06-24 NOTE — PROCEDURES
Multi-Ox Readings  Multi Ox #1 Room air   O2 sat % at rest 92   O2 sat % on exertion 88   O2 sat average on exertion     Multi Ox #2 2 LPM   O2 sat % at rest 94   O2 sat % on exertion 94   O2 sat average on exertion       Oxygen Use     Oxygen Frequency     Duration of need     Is the patient mobile within the home?     CPAP Use?     BIPAP Use?     Servo Titration

## 2024-06-25 ENCOUNTER — APPOINTMENT (OUTPATIENT)
Dept: ADMISSIONS | Facility: MEDICAL CENTER | Age: 79
End: 2024-06-25
Attending: INTERNAL MEDICINE
Payer: COMMERCIAL

## 2024-06-28 ENCOUNTER — PRE-ADMISSION TESTING (OUTPATIENT)
Dept: ADMISSIONS | Facility: MEDICAL CENTER | Age: 79
End: 2024-06-28
Attending: INTERNAL MEDICINE
Payer: COMMERCIAL

## 2024-06-28 DIAGNOSIS — Z01.812 PRE-OPERATIVE LABORATORY EXAMINATION: ICD-10-CM

## 2024-06-28 DIAGNOSIS — Z01.810 PRE-OPERATIVE CARDIOVASCULAR EXAMINATION: ICD-10-CM

## 2024-06-28 RX ORDER — POTASSIUM BICARB/MAG COMBO 21 500-300 MG
POWDER EFFERVESCENT (GRAM) ORAL PRN
COMMUNITY

## 2024-07-01 ENCOUNTER — PRE-ADMISSION TESTING (OUTPATIENT)
Dept: ADMISSIONS | Facility: MEDICAL CENTER | Age: 79
End: 2024-07-01
Attending: STUDENT IN AN ORGANIZED HEALTH CARE EDUCATION/TRAINING PROGRAM
Payer: COMMERCIAL

## 2024-07-01 DIAGNOSIS — Z01.812 PRE-OPERATIVE LABORATORY EXAMINATION: ICD-10-CM

## 2024-07-01 DIAGNOSIS — Z01.810 PRE-OPERATIVE CARDIOVASCULAR EXAMINATION: ICD-10-CM

## 2024-07-01 LAB
ANION GAP SERPL CALC-SCNC: 13 MMOL/L (ref 7–16)
BUN SERPL-MCNC: 18 MG/DL (ref 8–22)
CALCIUM SERPL-MCNC: 9.4 MG/DL (ref 8.4–10.2)
CHLORIDE SERPL-SCNC: 103 MMOL/L (ref 96–112)
CO2 SERPL-SCNC: 23 MMOL/L (ref 20–33)
CREAT SERPL-MCNC: 0.72 MG/DL (ref 0.5–1.4)
EKG IMPRESSION: NORMAL
ERYTHROCYTE [DISTWIDTH] IN BLOOD BY AUTOMATED COUNT: 45.4 FL (ref 35.9–50)
GFR SERPLBLD CREATININE-BSD FMLA CKD-EPI: 85 ML/MIN/1.73 M 2
GLUCOSE SERPL-MCNC: 96 MG/DL (ref 65–99)
HCT VFR BLD AUTO: 49.8 % (ref 37–47)
HGB BLD-MCNC: 16.6 G/DL (ref 12–16)
MCH RBC QN AUTO: 31.5 PG (ref 27–33)
MCHC RBC AUTO-ENTMCNC: 33.3 G/DL (ref 32.2–35.5)
MCV RBC AUTO: 94.5 FL (ref 81.4–97.8)
PLATELET # BLD AUTO: 204 K/UL (ref 164–446)
PMV BLD AUTO: 10.6 FL (ref 9–12.9)
POTASSIUM SERPL-SCNC: 3.7 MMOL/L (ref 3.6–5.5)
RBC # BLD AUTO: 5.27 M/UL (ref 4.2–5.4)
SODIUM SERPL-SCNC: 139 MMOL/L (ref 135–145)
WBC # BLD AUTO: 5.6 K/UL (ref 4.8–10.8)

## 2024-07-01 PROCEDURE — 36415 COLL VENOUS BLD VENIPUNCTURE: CPT

## 2024-07-01 PROCEDURE — 93005 ELECTROCARDIOGRAM TRACING: CPT

## 2024-07-01 PROCEDURE — 93010 ELECTROCARDIOGRAM REPORT: CPT | Performed by: INTERNAL MEDICINE

## 2024-07-01 PROCEDURE — 85027 COMPLETE CBC AUTOMATED: CPT

## 2024-07-01 PROCEDURE — 80048 BASIC METABOLIC PNL TOTAL CA: CPT

## 2024-07-02 ENCOUNTER — HOSPITAL ENCOUNTER (OUTPATIENT)
Facility: MEDICAL CENTER | Age: 79
End: 2024-07-02
Attending: STUDENT IN AN ORGANIZED HEALTH CARE EDUCATION/TRAINING PROGRAM | Admitting: STUDENT IN AN ORGANIZED HEALTH CARE EDUCATION/TRAINING PROGRAM
Payer: COMMERCIAL

## 2024-07-02 ENCOUNTER — APPOINTMENT (OUTPATIENT)
Dept: RADIOLOGY | Facility: MEDICAL CENTER | Age: 79
End: 2024-07-02
Attending: INTERNAL MEDICINE
Payer: COMMERCIAL

## 2024-07-02 ENCOUNTER — APPOINTMENT (OUTPATIENT)
Dept: RADIOLOGY | Facility: MEDICAL CENTER | Age: 79
End: 2024-07-02
Attending: STUDENT IN AN ORGANIZED HEALTH CARE EDUCATION/TRAINING PROGRAM
Payer: COMMERCIAL

## 2024-07-02 ENCOUNTER — ANESTHESIA (OUTPATIENT)
Dept: SURGERY | Facility: MEDICAL CENTER | Age: 79
End: 2024-07-02
Payer: COMMERCIAL

## 2024-07-02 ENCOUNTER — ANESTHESIA EVENT (OUTPATIENT)
Dept: SURGERY | Facility: MEDICAL CENTER | Age: 79
End: 2024-07-02
Payer: COMMERCIAL

## 2024-07-02 VITALS
BODY MASS INDEX: 26.74 KG/M2 | HEART RATE: 92 BPM | SYSTOLIC BLOOD PRESSURE: 144 MMHG | OXYGEN SATURATION: 96 % | WEIGHT: 145.28 LBS | RESPIRATION RATE: 18 BRPM | HEIGHT: 62 IN | DIASTOLIC BLOOD PRESSURE: 71 MMHG | TEMPERATURE: 98.4 F

## 2024-07-02 DIAGNOSIS — R91.8 PULMONARY NODULES: ICD-10-CM

## 2024-07-02 LAB
GRAM STN SPEC: NORMAL
SIGNIFICANT IND 70042: NORMAL
SITE SITE: NORMAL
SOURCE SOURCE: NORMAL

## 2024-07-02 PROCEDURE — 160025 RECOVERY II MINUTES (STATS): Performed by: STUDENT IN AN ORGANIZED HEALTH CARE EDUCATION/TRAINING PROGRAM

## 2024-07-02 PROCEDURE — 31654 BRONCH EBUS IVNTJ PERPH LES: CPT | Performed by: STUDENT IN AN ORGANIZED HEALTH CARE EDUCATION/TRAINING PROGRAM

## 2024-07-02 PROCEDURE — 700101 HCHG RX REV CODE 250: Performed by: ANESTHESIOLOGY

## 2024-07-02 PROCEDURE — 88305 TISSUE EXAM BY PATHOLOGIST: CPT

## 2024-07-02 PROCEDURE — 71045 X-RAY EXAM CHEST 1 VIEW: CPT

## 2024-07-02 PROCEDURE — 88173 CYTOPATH EVAL FNA REPORT: CPT

## 2024-07-02 PROCEDURE — 31629 BRONCHOSCOPY/NEEDLE BX EACH: CPT | Performed by: STUDENT IN AN ORGANIZED HEALTH CARE EDUCATION/TRAINING PROGRAM

## 2024-07-02 PROCEDURE — 88333 PATH CONSLTJ SURG CYTO XM 1: CPT

## 2024-07-02 PROCEDURE — 160035 HCHG PACU - 1ST 60 MINS PHASE I: Performed by: STUDENT IN AN ORGANIZED HEALTH CARE EDUCATION/TRAINING PROGRAM

## 2024-07-02 PROCEDURE — 31624 DX BRONCHOSCOPE/LAVAGE: CPT | Performed by: STUDENT IN AN ORGANIZED HEALTH CARE EDUCATION/TRAINING PROGRAM

## 2024-07-02 PROCEDURE — 700105 HCHG RX REV CODE 258: Performed by: STUDENT IN AN ORGANIZED HEALTH CARE EDUCATION/TRAINING PROGRAM

## 2024-07-02 PROCEDURE — 160046 HCHG PACU - 1ST 60 MINS PHASE II: Performed by: STUDENT IN AN ORGANIZED HEALTH CARE EDUCATION/TRAINING PROGRAM

## 2024-07-02 PROCEDURE — 160048 HCHG OR STATISTICAL LEVEL 1-5: Performed by: STUDENT IN AN ORGANIZED HEALTH CARE EDUCATION/TRAINING PROGRAM

## 2024-07-02 PROCEDURE — 71250 CT THORAX DX C-: CPT

## 2024-07-02 PROCEDURE — C1889 IMPLANT/INSERT DEVICE, NOC: HCPCS | Performed by: STUDENT IN AN ORGANIZED HEALTH CARE EDUCATION/TRAINING PROGRAM

## 2024-07-02 PROCEDURE — 160009 HCHG ANES TIME/MIN: Performed by: STUDENT IN AN ORGANIZED HEALTH CARE EDUCATION/TRAINING PROGRAM

## 2024-07-02 PROCEDURE — 87205 SMEAR GRAM STAIN: CPT

## 2024-07-02 PROCEDURE — 31628 BRONCHOSCOPY/LUNG BX EACH: CPT | Performed by: STUDENT IN AN ORGANIZED HEALTH CARE EDUCATION/TRAINING PROGRAM

## 2024-07-02 PROCEDURE — 160036 HCHG PACU - EA ADDL 30 MINS PHASE I: Performed by: STUDENT IN AN ORGANIZED HEALTH CARE EDUCATION/TRAINING PROGRAM

## 2024-07-02 PROCEDURE — 160047 HCHG PACU  - EA ADDL 30 MINS PHASE II: Performed by: STUDENT IN AN ORGANIZED HEALTH CARE EDUCATION/TRAINING PROGRAM

## 2024-07-02 PROCEDURE — 160031 HCHG SURGERY MINUTES - 1ST 30 MINS LEVEL 5: Performed by: STUDENT IN AN ORGANIZED HEALTH CARE EDUCATION/TRAINING PROGRAM

## 2024-07-02 PROCEDURE — 700111 HCHG RX REV CODE 636 W/ 250 OVERRIDE (IP): Performed by: ANESTHESIOLOGY

## 2024-07-02 PROCEDURE — 31652 BRONCH EBUS SAMPLNG 1/2 NODE: CPT | Performed by: STUDENT IN AN ORGANIZED HEALTH CARE EDUCATION/TRAINING PROGRAM

## 2024-07-02 PROCEDURE — 88172 CYTP DX EVAL FNA 1ST EA SITE: CPT

## 2024-07-02 PROCEDURE — C1887 CATHETER, GUIDING: HCPCS | Performed by: STUDENT IN AN ORGANIZED HEALTH CARE EDUCATION/TRAINING PROGRAM

## 2024-07-02 PROCEDURE — 160042 HCHG SURGERY MINUTES - EA ADDL 1 MIN LEVEL 5: Performed by: STUDENT IN AN ORGANIZED HEALTH CARE EDUCATION/TRAINING PROGRAM

## 2024-07-02 PROCEDURE — 160002 HCHG RECOVERY MINUTES (STAT): Performed by: STUDENT IN AN ORGANIZED HEALTH CARE EDUCATION/TRAINING PROGRAM

## 2024-07-02 PROCEDURE — 87070 CULTURE OTHR SPECIMN AEROBIC: CPT

## 2024-07-02 PROCEDURE — 502714 HCHG ROBOTIC SURGERY SERVICES: Performed by: STUDENT IN AN ORGANIZED HEALTH CARE EDUCATION/TRAINING PROGRAM

## 2024-07-02 RX ORDER — ROCURONIUM BROMIDE 10 MG/ML
INJECTION, SOLUTION INTRAVENOUS PRN
Status: DISCONTINUED | OUTPATIENT
Start: 2024-07-02 | End: 2024-07-02 | Stop reason: SURG

## 2024-07-02 RX ORDER — ONDANSETRON 2 MG/ML
INJECTION INTRAMUSCULAR; INTRAVENOUS PRN
Status: DISCONTINUED | OUTPATIENT
Start: 2024-07-02 | End: 2024-07-02 | Stop reason: SURG

## 2024-07-02 RX ORDER — LIDOCAINE HYDROCHLORIDE 20 MG/ML
INJECTION, SOLUTION EPIDURAL; INFILTRATION; INTRACAUDAL; PERINEURAL PRN
Status: DISCONTINUED | OUTPATIENT
Start: 2024-07-02 | End: 2024-07-02 | Stop reason: SURG

## 2024-07-02 RX ORDER — HALOPERIDOL 5 MG/ML
1 INJECTION INTRAMUSCULAR
Status: DISCONTINUED | OUTPATIENT
Start: 2024-07-02 | End: 2024-07-02 | Stop reason: HOSPADM

## 2024-07-02 RX ORDER — LABETALOL HYDROCHLORIDE 5 MG/ML
5 INJECTION, SOLUTION INTRAVENOUS
Status: DISCONTINUED | OUTPATIENT
Start: 2024-07-02 | End: 2024-07-02 | Stop reason: HOSPADM

## 2024-07-02 RX ORDER — SODIUM CHLORIDE, SODIUM LACTATE, POTASSIUM CHLORIDE, CALCIUM CHLORIDE 600; 310; 30; 20 MG/100ML; MG/100ML; MG/100ML; MG/100ML
INJECTION, SOLUTION INTRAVENOUS CONTINUOUS
Status: ACTIVE | OUTPATIENT
Start: 2024-07-02 | End: 2024-07-02

## 2024-07-02 RX ORDER — EPHEDRINE SULFATE 50 MG/ML
INJECTION, SOLUTION INTRAVENOUS PRN
Status: DISCONTINUED | OUTPATIENT
Start: 2024-07-02 | End: 2024-07-02 | Stop reason: SURG

## 2024-07-02 RX ORDER — DIPHENHYDRAMINE HYDROCHLORIDE 50 MG/ML
12.5 INJECTION INTRAMUSCULAR; INTRAVENOUS
Status: DISCONTINUED | OUTPATIENT
Start: 2024-07-02 | End: 2024-07-02 | Stop reason: HOSPADM

## 2024-07-02 RX ORDER — HYDRALAZINE HYDROCHLORIDE 20 MG/ML
5 INJECTION INTRAMUSCULAR; INTRAVENOUS
Status: DISCONTINUED | OUTPATIENT
Start: 2024-07-02 | End: 2024-07-02 | Stop reason: HOSPADM

## 2024-07-02 RX ORDER — DEXAMETHASONE SODIUM PHOSPHATE 4 MG/ML
INJECTION, SOLUTION INTRA-ARTICULAR; INTRALESIONAL; INTRAMUSCULAR; INTRAVENOUS; SOFT TISSUE PRN
Status: DISCONTINUED | OUTPATIENT
Start: 2024-07-02 | End: 2024-07-02 | Stop reason: SURG

## 2024-07-02 RX ORDER — ONDANSETRON 2 MG/ML
4 INJECTION INTRAMUSCULAR; INTRAVENOUS
Status: DISCONTINUED | OUTPATIENT
Start: 2024-07-02 | End: 2024-07-02 | Stop reason: HOSPADM

## 2024-07-02 RX ADMIN — SODIUM CHLORIDE, POTASSIUM CHLORIDE, SODIUM LACTATE AND CALCIUM CHLORIDE: 600; 310; 30; 20 INJECTION, SOLUTION INTRAVENOUS at 11:06

## 2024-07-02 RX ADMIN — SUGAMMADEX 200 MG: 100 INJECTION, SOLUTION INTRAVENOUS at 12:19

## 2024-07-02 RX ADMIN — FENTANYL CITRATE 25 MCG: 50 INJECTION, SOLUTION INTRAMUSCULAR; INTRAVENOUS at 12:05

## 2024-07-02 RX ADMIN — ONDANSETRON 4 MG: 2 INJECTION INTRAMUSCULAR; INTRAVENOUS at 11:44

## 2024-07-02 RX ADMIN — PROPOFOL 100 MG: 10 INJECTION, EMULSION INTRAVENOUS at 11:12

## 2024-07-02 RX ADMIN — PROPOFOL 50 MG: 10 INJECTION, EMULSION INTRAVENOUS at 12:03

## 2024-07-02 RX ADMIN — ROCURONIUM BROMIDE 30 MG: 50 INJECTION, SOLUTION INTRAVENOUS at 12:02

## 2024-07-02 RX ADMIN — ROCURONIUM BROMIDE 50 MG: 50 INJECTION, SOLUTION INTRAVENOUS at 11:12

## 2024-07-02 RX ADMIN — LIDOCAINE HYDROCHLORIDE 40 MG: 20 INJECTION, SOLUTION EPIDURAL; INFILTRATION; INTRACAUDAL at 11:12

## 2024-07-02 RX ADMIN — FENTANYL CITRATE 50 MCG: 50 INJECTION, SOLUTION INTRAMUSCULAR; INTRAVENOUS at 11:28

## 2024-07-02 RX ADMIN — DEXAMETHASONE SODIUM PHOSPHATE 8 MG: 4 INJECTION INTRA-ARTICULAR; INTRALESIONAL; INTRAMUSCULAR; INTRAVENOUS; SOFT TISSUE at 11:43

## 2024-07-02 RX ADMIN — PROPOFOL 50 MG: 10 INJECTION, EMULSION INTRAVENOUS at 11:38

## 2024-07-02 RX ADMIN — ALBUTEROL SULFATE 2.5 MG: 2.5 SOLUTION RESPIRATORY (INHALATION) at 13:22

## 2024-07-02 RX ADMIN — PROPOFOL 100 MG: 10 INJECTION, EMULSION INTRAVENOUS at 11:27

## 2024-07-02 RX ADMIN — FENTANYL CITRATE 25 MCG: 50 INJECTION, SOLUTION INTRAMUSCULAR; INTRAVENOUS at 12:24

## 2024-07-02 RX ADMIN — EPHEDRINE SULFATE 10 MG: 50 INJECTION, SOLUTION INTRAVENOUS at 11:34

## 2024-07-02 RX ADMIN — FENTANYL CITRATE 50 MCG: 50 INJECTION, SOLUTION INTRAMUSCULAR; INTRAVENOUS at 11:12

## 2024-07-02 ASSESSMENT — ENCOUNTER SYMPTOMS
VOMITING: 0
FALLS: 0
SHORTNESS OF BREATH: 1
COUGH: 1
EYE REDNESS: 0
FEVER: 0
FOCAL WEAKNESS: 0
SPUTUM PRODUCTION: 1

## 2024-07-02 ASSESSMENT — PAIN DESCRIPTION - PAIN TYPE: TYPE: CHRONIC PAIN

## 2024-07-02 ASSESSMENT — PAIN SCALES - GENERAL: PAIN_LEVEL: 0

## 2024-07-03 LAB — PATHOLOGY CONSULT NOTE: NORMAL

## 2024-07-04 LAB
BACTERIA SPEC RESP CULT: NORMAL
GRAM STN SPEC: NORMAL
SIGNIFICANT IND 70042: NORMAL
SITE SITE: NORMAL
SOURCE SOURCE: NORMAL

## 2024-07-05 ENCOUNTER — TELEPHONE (OUTPATIENT)
Dept: SLEEP MEDICINE | Facility: MEDICAL CENTER | Age: 79
End: 2024-07-05
Payer: COMMERCIAL

## 2024-07-11 ENCOUNTER — APPOINTMENT (OUTPATIENT)
Dept: ADMISSIONS | Facility: MEDICAL CENTER | Age: 79
End: 2024-07-11
Attending: INTERNAL MEDICINE
Payer: COMMERCIAL

## 2024-07-16 ENCOUNTER — PRE-ADMISSION TESTING (OUTPATIENT)
Dept: ADMISSIONS | Facility: MEDICAL CENTER | Age: 79
End: 2024-07-16
Attending: INTERNAL MEDICINE
Payer: COMMERCIAL

## 2024-07-16 NOTE — PREPROCEDURE INSTRUCTIONS
GANESH appt done with pt. Advised pt that she may need to hold her NSAIDs for 5 days prior to procedure. Instructed pt to ask Dr. Alcantar if continuing to take ibuprofen is ok prior to procedure, or if that is something she will need to hold for the procedure. Pt verbalized understanding.    Pt aware that she is to be NPO for 8 hours prior to procedure with exception of clear liquids up until 3 hours prior to procedure.     Patient does not need any labs prior to procedure.     Patient has no further questions at this time.

## 2024-07-30 ENCOUNTER — HOSPITAL ENCOUNTER (OUTPATIENT)
Facility: MEDICAL CENTER | Age: 79
End: 2024-07-30
Attending: INTERNAL MEDICINE | Admitting: INTERNAL MEDICINE
Payer: COMMERCIAL

## 2024-07-30 ENCOUNTER — APPOINTMENT (OUTPATIENT)
Dept: RADIOLOGY | Facility: MEDICAL CENTER | Age: 79
End: 2024-07-30
Attending: INTERNAL MEDICINE
Payer: COMMERCIAL

## 2024-07-30 VITALS
SYSTOLIC BLOOD PRESSURE: 142 MMHG | HEIGHT: 63 IN | HEART RATE: 81 BPM | WEIGHT: 150 LBS | OXYGEN SATURATION: 92 % | RESPIRATION RATE: 15 BRPM | BODY MASS INDEX: 26.58 KG/M2 | DIASTOLIC BLOOD PRESSURE: 77 MMHG

## 2024-07-30 DIAGNOSIS — C34.11 MALIGNANT NEOPLASM OF UPPER LOBE OF RIGHT LUNG (HCC): ICD-10-CM

## 2024-07-30 DIAGNOSIS — C50.911 MALIGNANT NEOPLASM OF RIGHT FEMALE BREAST, UNSPECIFIED ESTROGEN RECEPTOR STATUS, UNSPECIFIED SITE OF BREAST (HCC): ICD-10-CM

## 2024-07-30 LAB
APPEARANCE FLD: NORMAL
BODY FLD TYPE: NORMAL
BODY FLD TYPE: NORMAL
CELLS FLD: 2
COLOR FLD: NORMAL
CYTOLOGY REG CYTOL: NORMAL
GLUCOSE FLD-MCNC: 89 MG/DL
LDH FLD L TO P-CCNC: 212 U/L
LYMPHOCYTES NFR FLD: 76 %
MONOS+MACROS NFR FLD MANUAL: 14 %
NEUTROPHILS NFR FLD: 8 %
NUC CELL # FLD: 3812 CELLS/UL
PROT FLD-MCNC: 3.7 G/DL
RBC # FLD: NORMAL CELLS/UL

## 2024-07-30 PROCEDURE — 89051 BODY FLUID CELL COUNT: CPT

## 2024-07-30 PROCEDURE — C1894 INTRO/SHEATH, NON-LASER: HCPCS

## 2024-07-30 PROCEDURE — 82945 GLUCOSE OTHER FLUID: CPT

## 2024-07-30 PROCEDURE — 88305 TISSUE EXAM BY PATHOLOGIST: CPT

## 2024-07-30 PROCEDURE — 83615 LACTATE (LD) (LDH) ENZYME: CPT

## 2024-07-30 PROCEDURE — 700111 HCHG RX REV CODE 636 W/ 250 OVERRIDE (IP): Mod: JZ

## 2024-07-30 PROCEDURE — 32555 ASPIRATE PLEURA W/ IMAGING: CPT | Mod: RT

## 2024-07-30 PROCEDURE — 84157 ASSAY OF PROTEIN OTHER: CPT

## 2024-07-30 PROCEDURE — 88341 IMHCHEM/IMCYTCHM EA ADD ANTB: CPT

## 2024-07-30 PROCEDURE — C1729 CATH, DRAINAGE: HCPCS

## 2024-07-30 PROCEDURE — 700111 HCHG RX REV CODE 636 W/ 250 OVERRIDE (IP): Mod: JZ | Performed by: RADIOLOGY

## 2024-07-30 PROCEDURE — 700101 HCHG RX REV CODE 250

## 2024-07-30 PROCEDURE — 88112 CYTOPATH CELL ENHANCE TECH: CPT

## 2024-07-30 PROCEDURE — 88342 IMHCHEM/IMCYTCHM 1ST ANTB: CPT

## 2024-07-30 RX ORDER — OXYCODONE HYDROCHLORIDE 10 MG/1
10 TABLET ORAL
Status: DISCONTINUED | OUTPATIENT
Start: 2024-07-30 | End: 2024-07-30 | Stop reason: HOSPADM

## 2024-07-30 RX ORDER — MIDAZOLAM HYDROCHLORIDE 1 MG/ML
.5-2 INJECTION INTRAMUSCULAR; INTRAVENOUS PRN
Status: DISCONTINUED | OUTPATIENT
Start: 2024-07-30 | End: 2024-07-30 | Stop reason: HOSPADM

## 2024-07-30 RX ORDER — LIDOCAINE HCL/EPINEPHRINE/PF 2%-1:200K
VIAL (ML) INJECTION
Status: COMPLETED
Start: 2024-07-30 | End: 2024-07-30

## 2024-07-30 RX ORDER — ONDANSETRON 2 MG/ML
4 INJECTION INTRAMUSCULAR; INTRAVENOUS PRN
Status: DISCONTINUED | OUTPATIENT
Start: 2024-07-30 | End: 2024-07-30 | Stop reason: HOSPADM

## 2024-07-30 RX ORDER — OXYCODONE HYDROCHLORIDE 5 MG/1
5 TABLET ORAL
Status: DISCONTINUED | OUTPATIENT
Start: 2024-07-30 | End: 2024-07-30 | Stop reason: HOSPADM

## 2024-07-30 RX ORDER — SODIUM CHLORIDE 9 MG/ML
500 INJECTION, SOLUTION INTRAVENOUS
Status: DISCONTINUED | OUTPATIENT
Start: 2024-07-30 | End: 2024-07-30 | Stop reason: HOSPADM

## 2024-07-30 RX ORDER — MIDAZOLAM HYDROCHLORIDE 1 MG/ML
INJECTION INTRAMUSCULAR; INTRAVENOUS
Status: COMPLETED
Start: 2024-07-30 | End: 2024-07-30

## 2024-07-30 RX ORDER — MORPHINE SULFATE 4 MG/ML
4 INJECTION INTRAVENOUS
Status: DISCONTINUED | OUTPATIENT
Start: 2024-07-30 | End: 2024-07-30 | Stop reason: HOSPADM

## 2024-07-30 RX ORDER — ONDANSETRON 2 MG/ML
4 INJECTION INTRAMUSCULAR; INTRAVENOUS EVERY 8 HOURS PRN
Status: DISCONTINUED | OUTPATIENT
Start: 2024-07-30 | End: 2024-07-30 | Stop reason: HOSPADM

## 2024-07-30 RX ADMIN — FENTANYL CITRATE 50 MCG: 50 INJECTION, SOLUTION INTRAMUSCULAR; INTRAVENOUS at 10:18

## 2024-07-30 RX ADMIN — HEPARIN 2500 UNITS: 100 SYRINGE at 10:38

## 2024-07-30 RX ADMIN — MIDAZOLAM HYDROCHLORIDE 1 MG: 1 INJECTION, SOLUTION INTRAMUSCULAR; INTRAVENOUS at 10:18

## 2024-07-30 RX ADMIN — MIDAZOLAM HYDROCHLORIDE 1 MG: 2 INJECTION, SOLUTION INTRAMUSCULAR; INTRAVENOUS at 10:18

## 2024-07-30 RX ADMIN — FENTANYL CITRATE 25 MCG: 50 INJECTION, SOLUTION INTRAMUSCULAR; INTRAVENOUS at 10:27

## 2024-07-30 RX ADMIN — MIDAZOLAM HYDROCHLORIDE 0.5 MG: 2 INJECTION, SOLUTION INTRAMUSCULAR; INTRAVENOUS at 10:21

## 2024-07-30 RX ADMIN — LIDOCAINE HYDROCHLORIDE,EPINEPHRINE BITARTRATE: 20; .005 INJECTION, SOLUTION EPIDURAL; INFILTRATION; INTRACAUDAL; PERINEURAL at 10:19

## 2024-07-30 RX ADMIN — FENTANYL CITRATE 25 MCG: 50 INJECTION, SOLUTION INTRAMUSCULAR; INTRAVENOUS at 10:21

## 2024-07-30 RX ADMIN — MIDAZOLAM HYDROCHLORIDE 0.5 MG: 2 INJECTION, SOLUTION INTRAMUSCULAR; INTRAVENOUS at 10:27

## 2024-07-30 NOTE — PROGRESS NOTES
US guided therapeutic thoracentesis done by Harlan WOO ;(no H&P required as this is a NON SEDATION procedure) right mid posterior back access site; dressing CDI; 1160 mL obtained and 100ml discarded, 1060ml sent to lab.  Pt tolerated the procedure well. Pt hemodynamically stable pre/intra/post procedure. All questions and concerns answered prior to d/c. Patient provided with all appropriate education for procedure. Pt d/c home.

## 2024-07-30 NOTE — OR SURGEON
Immediate Post- Operative Note        Findings: R breast cancer. No radiation planned per patient.       Procedure(s): R IJ port placement.       Estimated Blood Loss: Less than 5 ml        Complications: None            7/30/2024     1135 AM     Terrance Caldera M.D.

## 2024-07-30 NOTE — PROGRESS NOTES
Pt presents to OPIR. Miss Barnes was consented by MD at bedside, confirmed by this RN and consent at bedside. Pt transferred to OPIR table in supine position. Patient underwent a right port placement by Dr. Caldera. Procedure site was marked by MD and verified using imaging guidance. Pt placed on monitor, prepped and draped in a sterile fashion. Vitals were taken every 5 minutes and remained stable during procedure (see doc flow sheet for results). CO2 waveform capnography was monitored and remained WNL throughout procedure.       Bard Powerport Clearvue Slim implantable Port 8F to right chest  REF: [id number]  LOT: [id number]  EXP: 2026-02-28

## 2024-08-09 NOTE — PROGRESS NOTES
Pulmonary Clinic Note    Date of Visit: 8/12/2024     Chief Complaint:  No chief complaint on file.    HPI:   Emily Barnes is a very pleasant 79 y.o. year old female current smoker (125+ pack-years, smokes ***), with a PMHx of COPD, pulmonary nodules, hilar and subcarinal adenopathy, stage IIa right breast cancer s/p partial mastectomy and radiation in 2011 on anastrozole, small cell carcinoma in 2020 with chemotherapy and radiation who presented to the Pulmonary Clinic for a regular follow up. Last seen in the office on 6/24/2024 with Dr. Up.     Patient is followed by the pulmonary office for COPD, pulmonary nodules, and hilar/subcarinal adenopathy.  Patient has a history of small cell carcinoma in 2020 that was treated with 1 round of chemotherapy and radiation.  She only received 1 round of chemotherapy, she refused to continue.  CT in March 2023 which showed no evidence of ongoing disease, however, CT from 5/13/2024 showed increasing right hilar lymphadenopathy and right lower lobe nodules. She subsequently underwent a PET scan on 6/10/2024 showed that these areas were hypermetabolic.  Patient underwent a [facility] biopsy in July 2024, which showed small cell carcinoma.  Patient has a follow-up with oncologist Dr. Alcantar.   Patient also underwent thoracentesis in July 2024 which showed malignancy.  PFTs in 2020 showed a FEV1 1.60 L or 80%, FEV1/FVC 70%, %, DLCO 73% predicted, without positive bronchodilator response.  Patient is not on any inhaler therapy.    Interval events:  8/12/2024-  ***    Exacerbations this year:    Current medication regimen:    Oxygen use:    MMRC Grade:   0- Breathless only during strenuous exercise  1- Short of breath when hurrying or going up a small hill  2- Walks slower than friends due to breathlessness, has to stop at own pace  3- Stops to catch breath on level ground after 100m  4- Breathless with ambulating around house or ADLs    Smoking hx      Past Medical  History:   Diagnosis Date    Anemia 1962    Arthritis     Breast cancer (HCC)     Cancer (HCC) 2011    right breast-lumpectomy    Cancer (HCC) 2020    lung-chemo, radiation    Cataract     Bilateral IOL    COPD (chronic obstructive pulmonary disease) (HCC)     Cough     Dental disorder     upper and lower dentures    EMPHYSEMA     High cholesterol     Pain     occasional breast pain- resolved    Pain     bilateral pain    Pneumonia 1948    Renal disorder 1997    hx of stones    Renal disorder     stones and cyst; 4/29/20 pt reports unaware of renal cyst     S/P radiation therapy     for breast cancer 2011 @ [facility] Dr. Mccartney    Shortness of breath 07/16/2024    with exertion, oxygen at night    Small cell lung cancer (HCC)     Snoring     Sputum production     Urinary incontinence 07/16/2024    wears diapers    Wheezing      Past Surgical History:   Procedure Laterality Date    CO BRONCHOSCOPY,DIAGNOSTIC N/A 7/2/2024    Procedure: FIBER OPTIC BRONCHOSCOPY WITH POSSIBLE WASH, BRUSH, BRONCHOALVEOLAR LAVAGE, BIOPSY AND FINE NEEDLE ASPIRATION, ENDOBRONCHIAL ULTRASOUND AND NAVIGATION, ROBOTICS;  Surgeon: Smita Gaines M.D.;  Location: [facility];  Service: Pulmonary Robotic    CO DRAIN/INJECT LARGE JOINT/BURSA Right 8/4/2022    Procedure: RIGHT hip intra-articular injection. Patient reports emesis with iodine, use non- iodine.;  Surgeon: Riley Phipps M.D.;  [facility];  Service: Pain Management    CO BRONCHOSCOPY,DIAGNOSTIC  5/7/2020    Procedure: BRONCHOSCOPY-FIBER OPTIC WITH POSSIBLE WASH, BRUSH, BROCHOALVEOLAR LAVAGE, BIOPSY FINE NEEDLE ASPIRATION;  Surgeon: Peter Foster M.D.;  Location: [facility];  Service: Pulmonary    ENDOBRONCHIAL US ADD-ON  5/7/2020    Procedure: ENDOBRONCHIAL ULTRASOUND (EBUS);  Surgeon: Peter Foster M.D.;  Location: [facility];  Service: Pulmonary    COLONOSCOPY WITH BIOPSY  3/15/2012    Performed by  RC FANG at [facility] ORS    BREAST BIOPSY  8/23/2011    Performed by MOLLY PARRA at [facility]    NODE BIOPSY SENTINEL  8/8/2011    Performed by MOLLY PARRA at [facility]S    LUMPECTOMY  8/8/2011    Performed by MOLLY PARRA at [facility]    TONSILLECTOMY  1972    SD RADIATION THERAPY PLAN SIMPLE       Social History     Socioeconomic History    Marital status:      Spouse name: Not on file    Number of children: Not on file    Years of education: Not on file    Highest education level: Not on file   Occupational History    Not on file   Tobacco Use    Smoking status: Every Day     Current packs/day: 0.75     Average packs/day: 2.0 packs/day for 62.6 years (125.0 ttl pk-yrs)     Types: Cigarettes     Start date: 1962    Smokeless tobacco: Never    Tobacco comments:     0.75 ppd currently   Vaping Use    Vaping status: Never Used   Substance and Sexual Activity    Alcohol use: Yes     Comment: 4 drinks/week    Drug use: No    Sexual activity: Not on file   Other Topics Concern    Not on file   Social History Narrative    ** Merged History Encounter **          Social Determinants of Health     Financial Resource Strain: Not on file   Food Insecurity: Unknown (5/15/2020)    Hunger Vital Sign     Worried About Running Out of Food in the Last Year: Patient declined     Ran Out of Food in the Last Year: Patient declined   Transportation Needs: Not on file   Physical Activity: Not on file   Stress: Not on file (2/11/2021)   Social Connections: Not on file   Intimate Partner Violence: Low Risk  (4/13/2021)    Received [facility]    Intimate Partner Violence     Insults You: Not on file     Threatens You: Not on file     Screams at You: Not on file     Physically Hurt: Not on file     Intimate Partner Violence Score: Not on file   Housing Stability: Not on file        Family History   Problem Relation Age of Onset     Stroke Other     Heart Disease Other     Cancer Mother         lung cancer    Heart Disease Father     Cancer Sister         lung cancer    Cancer Sister         lung cancer    Cancer Sister         unknown cancer     Current Outpatient Medications on File Prior to Visit   Medication Sig Dispense Refill    Vitamin D 12.5 MCG/0.25ML Liquid Take  by mouth as needed.      Cyanocobalamin (VITAMIN B-12) 3000 MCG/ML Liquid Place  under the tongue as needed.      ibuprofen (MOTRIN) 200 MG Tab Take 200 mg by mouth every 6 hours as needed.      atorvastatin (LIPITOR) 10 MG Tab Take 10 mg by mouth every morning.       No current facility-administered medications on file prior to visit.     Allergies: Flu virus vaccine    ROS:   ROS    Vitals:  There were no vitals taken for this visit.    Physical Exam:  Physical Exam    Laboratory Data:  Multioximetry (Date: ***)-       PFTs: (Date: 5/21/2020)-      Impression:  PFTs are consistent with mild COPD.     CT Chest: (Date: 7/2/2024)-  Impression:  1.  ION protocol for navigational bronchoscopy.  2.  Suspect 43 mm x 26 mm intramuscular lipoma in the right posterior scalene muscle. This is only partly in the field-of-view.  3.  Extensive coronary calcification.  4.  Right hilar adenopathy. Progressive since prior exam.  5.  Emphysema.  6.  Extensive nodular and confluent mass densities in the right lower lobe progressive since prior exam. Also, interval development of multiple subpleural nodules in the right middle lobe anteriorly. Findings are consistent with progression of   malignancy.  7.  Interval development of moderate-sized right pleural effusion, highly suspicious for malignant effusion.      Assessment and Plan:    Problem List Items Addressed This Visit    None    Diagnostic studies have been reviewed with the patient.    No follow-ups on file.     This note was generated using voice recognition software which has a chance of producing errors of grammar and possibly  content.  I have made every reasonable attempt to find and correct any obvious errors, but it should be expected that some may not be found prior to finalization of this note.    Time spent in record review prior to patient arrival, reviewing results, and in face-to-face encounter totaled *** min.  __________  AMNA Tatum  Pulmonary Medicine  Counts include 234 beds at the [facility]

## 2024-08-12 ENCOUNTER — APPOINTMENT (OUTPATIENT)
Dept: SLEEP MEDICINE | Facility: MEDICAL CENTER | Age: 79
End: 2024-08-12
Payer: COMMERCIAL

## 2024-09-24 ENCOUNTER — APPOINTMENT (OUTPATIENT)
Dept: RADIOLOGY | Facility: MEDICAL CENTER | Age: 79
End: 2024-09-24
Payer: COMMERCIAL

## 2025-01-01 ENCOUNTER — APPOINTMENT (OUTPATIENT)
Dept: RADIOLOGY | Facility: MEDICAL CENTER | Age: 80
DRG: 180 | End: 2025-01-01
Attending: INTERNAL MEDICINE
Payer: COMMERCIAL

## 2025-01-01 ENCOUNTER — HOME CARE VISIT (OUTPATIENT)
Dept: HOSPICE | Facility: HOSPICE | Age: 80
End: 2025-01-01
Payer: COMMERCIAL

## 2025-01-01 ENCOUNTER — APPOINTMENT (OUTPATIENT)
Dept: RADIOLOGY | Facility: MEDICAL CENTER | Age: 80
DRG: 180 | End: 2025-01-01
Payer: COMMERCIAL

## 2025-01-01 ENCOUNTER — HOSPICE ADMISSION (OUTPATIENT)
Dept: HOSPICE | Facility: HOSPICE | Age: 80
End: 2025-01-01
Payer: COMMERCIAL

## 2025-01-01 ENCOUNTER — APPOINTMENT (OUTPATIENT)
Dept: RADIOLOGY | Facility: MEDICAL CENTER | Age: 80
DRG: 180 | End: 2025-01-01
Attending: EMERGENCY MEDICINE
Payer: COMMERCIAL

## 2025-01-01 ENCOUNTER — APPOINTMENT (OUTPATIENT)
Dept: RADIOLOGY | Facility: MEDICAL CENTER | Age: 80
DRG: 180 | End: 2025-01-01
Attending: RADIOLOGY
Payer: COMMERCIAL

## 2025-01-01 ENCOUNTER — PHARMACY VISIT (OUTPATIENT)
Dept: PHARMACY | Facility: MEDICAL CENTER | Age: 80
End: 2025-01-01
Payer: COMMERCIAL

## 2025-01-01 VITALS — RESPIRATION RATE: 24 BRPM | SYSTOLIC BLOOD PRESSURE: 104 MMHG | HEART RATE: 68 BPM | DIASTOLIC BLOOD PRESSURE: 72 MMHG

## 2025-01-01 VITALS — HEART RATE: 80 BPM | DIASTOLIC BLOOD PRESSURE: 70 MMHG | RESPIRATION RATE: 12 BRPM | SYSTOLIC BLOOD PRESSURE: 110 MMHG

## 2025-01-01 VITALS — HEART RATE: 80 BPM | RESPIRATION RATE: 20 BRPM

## 2025-01-01 VITALS — RESPIRATION RATE: 16 BRPM | HEART RATE: 104 BPM

## 2025-01-01 DIAGNOSIS — C34.90 SMALL CELL CARCINOMA OF LUNG, UNSPECIFIED LATERALITY, UNSPECIFIED PART OF LUNG (HCC): Primary | ICD-10-CM

## 2025-01-01 PROCEDURE — RXMED WILLOW AMBULATORY MEDICATION CHARGE: Performed by: REHABILITATION PRACTITIONER

## 2025-01-01 PROCEDURE — 71045 X-RAY EXAM CHEST 1 VIEW: CPT

## 2025-01-01 PROCEDURE — S9126 HOSPICE CARE, IN THE HOME, P: HCPCS

## 2025-01-01 PROCEDURE — 72131 CT LUMBAR SPINE W/O DYE: CPT

## 2025-01-01 PROCEDURE — 75989 ABSCESS DRAINAGE UNDER X-RAY: CPT

## 2025-01-01 PROCEDURE — 665036 HSPC NOTICE OF ELECTION NOE

## 2025-01-01 PROCEDURE — G0156 HHCP-SVS OF AIDE,EA 15 MIN: HCPCS

## 2025-01-01 PROCEDURE — G0299 HHS/HOSPICE OF RN EA 15 MIN: HCPCS

## 2025-01-01 PROCEDURE — 72128 CT CHEST SPINE W/O DYE: CPT

## 2025-01-01 PROCEDURE — 71250 CT THORAX DX C-: CPT

## 2025-01-01 PROCEDURE — G0155 HHCP-SVS OF CSW,EA 15 MIN: HCPCS

## 2025-01-01 PROCEDURE — C1729 CATH, DRAINAGE: HCPCS

## 2025-01-01 RX ORDER — MORPHINE SULFATE 100 MG/5ML
20-30 SOLUTION ORAL
Qty: 240 ML | Refills: 0 | Status: SHIPPED | OUTPATIENT
Start: 2025-01-01 | End: 2025-04-09

## 2025-01-01 RX ORDER — LORAZEPAM 2 MG/ML
1 CONCENTRATE ORAL EVERY 4 HOURS
Qty: 360 ML | Refills: 0 | Status: SHIPPED | OUTPATIENT
Start: 2025-01-01 | End: 2025-04-09

## 2025-01-01 RX ORDER — IPRATROPIUM BROMIDE AND ALBUTEROL SULFATE 2.5; .5 MG/3ML; MG/3ML
3 SOLUTION RESPIRATORY (INHALATION) EVERY 4 HOURS PRN
Qty: 90 ML | Refills: 11 | Status: SHIPPED | OUTPATIENT
Start: 2025-01-01 | End: 2026-04-01

## 2025-01-01 RX ORDER — MORPHINE SULFATE 100 MG/5ML
30 SOLUTION ORAL EVERY 4 HOURS
Qty: 240 ML | Refills: 0 | Status: SHIPPED | OUTPATIENT
Start: 2025-01-01 | End: 2025-04-09

## 2025-01-01 SDOH — ECONOMIC STABILITY: HOUSING INSECURITY: HOME SAFETY: O2 SIGN POSTED, CAN SMELL CIGARETTE SMOKE. WINDOWS ARE OPEN FOR VENTILLATION.

## 2025-01-01 SDOH — ECONOMIC STABILITY: HOUSING INSECURITY: EVIDENCE OF SMOKING MATERIAL: 1

## 2025-01-01 ASSESSMENT — ENCOUNTER SYMPTOMS
MUSCLE WEAKNESS: 1
SLEEP QUALITY: FAIR
FATIGUES EASILY: 1
LAST BOWEL MOVEMENT: 67297
HIGHEST PAIN SEVERITY IN PAST 24 HOURS: 6/10
COUGH: 1
MUSCLE WEAKNESS: 1
PAIN: 1
PAIN LOCATION - EXACERBATING FACTORS: MOVEMENT
FATIGUE: 1
SLEEP QUALITY: FAIR
DECREASED ORAL INTAKE: 1
MUSCLE WEAKNESS: 1
DIARRHEA: 1
APNEIC BREATHING: 1
DRY SKIN: 1
PAIN LOCATION - PAIN SEVERITY: 2/10
DRY SKIN: 1
SLEEP QUALITY: ADEQUATE
HIGHEST PAIN SEVERITY IN PAST 24 HOURS: 5/10
PAIN LOCATION - PAIN FREQUENCY: FREQUENT
FORGETFULNESS: 1
PAIN LOCATION - PAIN DURATION: CHRONIC
FATIGUE: 1
LOWEST PAIN SEVERITY IN PAST 24 HOURS: 0/10
PAIN LOCATION - RELIEVING FACTORS: POSITIONING AND MEDICATIONS
BOWEL INCONTINENCE: 1
PAIN: 1
DRY SKIN: 1
LAST BOWEL MOVEMENT: 67297
COUGH CHARACTERISTICS: NON-PRODUCTIVE
SLEEP QUALITY: FAIR
FATIGUE: 1
LAST BOWEL MOVEMENT: 67294
STOOL FREQUENCY: LESS THAN DAILY
INCREASED FATIGUE: 1
SUBJECTIVE PAIN PROGRESSION: GRADUALLY IMPROVING
PAIN LOCATION - RELIEVING FACTORS: MEDS
PAIN SEVERITY GOAL: 3/10
PAIN LOCATION: RIGHT LOWER BACK
LOWEST PAIN SEVERITY IN PAST 24 HOURS: 0/10
PAIN LOCATION - PAIN SEVERITY: 8/10
FATIGUES EASILY: 1
COUGH CHARACTERISTICS: MOIST
PAIN LOCATION - EXACERBATING FACTORS: STANDING
APNEIC BREATHING: 1
COUGH: 1
PAIN SEVERITY GOAL: 2/10
INCREASED SLEEPING: 1
FORGETFULNESS: 1
FATIGUE: 1
SUBJECTIVE PAIN PROGRESSION: WAXING AND WANING
LAST BOWEL MOVEMENT: 67297
MUSCLE WEAKNESS: 1
STOOL FREQUENCY: MORE THAN TWICE DAILY
LIMITED RANGE OF MOTION: 1
BOWEL PATTERN NORMAL: 1
INCREASED SLEEPING: 1
INCREASED FATIGUE: 1
COUGH: 1
COUGH: 1
PAIN LOCATION - PAIN QUALITY: SHARP
LIMITED RANGE OF MOTION: 1
PAIN LOCATION - PAIN FREQUENCY: INTERMITTENT
DECREASED ORAL INTAKE: 1
PAIN LOCATION - PAIN QUALITY: ACHE

## 2025-01-01 ASSESSMENT — ACTIVITIES OF DAILY LIVING (ADL)
AMBULATION ASSISTANCE: STAND BY ASSIST
AMBULATION ASSISTANCE: ONE PERSON
DRESSING_REQUIRES_ASSISTANCE: 1
CONTINENCE_REQUIRES_ASSISTANCE: 1
AMBULATION_REQUIRES_ASSISTANCE: 1
BATHING_REQUIRES_ASSISTANCE: 1
AMBULATION ASSISTANCE: NON-AMBULATORY
BATHING_REQUIRES_ASSISTANCE: 1
GROOMING_REQUIRES_ASSISTANCE: 1
SHOPPING_REQUIRES_ASSISTANCE: 1
AMBULATION ASSISTANCE: NON-AMBULATORY
LAUNDRY_REQUIRES_ASSISTANCE: 1
PHYSICAL_TRANSFER_REQUIRES_ASSISTANCE: 1
MONEY MANAGEMENT (EXPENSES/BILLS): TOTALLY DEPENDENT
AMBULATION ASSISTANCE: STAND BY ASSIST
AMBULATION_REQUIRES_ASSISTANCE: 1
CURRENT_FUNCTION: ONE PERSON

## 2025-01-01 ASSESSMENT — SOCIAL DETERMINANTS OF HEALTH (SDOH)
ACTIVE STRESSOR - HEALTH CHANGES: 1
ACTIVE STRESSOR - LOSS OF CONTROL: 1
ACTIVE STRESSOR - HEALTH CHANGES: 1
ACTIVE STRESSOR - NO STRESS FACTORS: 1
FINANCIAL_CONCERNS: 1
EXPRESSED_FINANCIAL_NEED: 1
ACTIVE STRESSOR - HEALTH CHANGES: 1

## 2025-01-01 ASSESSMENT — COPD QUESTIONNAIRES: COPD: 1

## 2025-01-01 ASSESSMENT — PAIN SCALES - PAIN ASSESSMENT IN ADVANCED DEMENTIA (PAINAD)
BODYLANGUAGE: 0 - RELAXED.
TOTALSCORE: 0
FACIALEXPRESSION: 0 - SMILING OR INEXPRESSIVE.
TOTALSCORE: 0
CONSOLABILITY: 0 - NO NEED TO CONSOLE.
NEGVOCALIZATION: 0 - NONE.
BODYLANGUAGE: 0 - RELAXED.
NEGVOCALIZATION: 0 - NONE.
CONSOLABILITY: 0 - NO NEED TO CONSOLE.
FACIALEXPRESSION: 0 - SMILING OR INEXPRESSIVE.

## 2025-01-01 ASSESSMENT — PATIENT HEALTH QUESTIONNAIRE - PHQ9
CLINICAL INTERPRETATION OF PHQ2 SCORE: 3
CLINICAL INTERPRETATION OF PHQ2 SCORE: 0
5. POOR APPETITE OR OVEREATING: 3 - NEARLY EVERY DAY
SUM OF ALL RESPONSES TO PHQ QUESTIONS 1-9: 14

## 2025-03-25 ENCOUNTER — HOSPITAL ENCOUNTER (INPATIENT)
Facility: MEDICAL CENTER | Age: 80
End: 2025-03-25
Attending: EMERGENCY MEDICINE | Admitting: STUDENT IN AN ORGANIZED HEALTH CARE EDUCATION/TRAINING PROGRAM
Payer: COMMERCIAL

## 2025-03-25 DIAGNOSIS — M54.50 CHRONIC RIGHT-SIDED LOW BACK PAIN WITHOUT SCIATICA: ICD-10-CM

## 2025-03-25 DIAGNOSIS — G89.3 CANCER ASSOCIATED PAIN: ICD-10-CM

## 2025-03-25 DIAGNOSIS — R91.8 PULMONARY NODULES: ICD-10-CM

## 2025-03-25 DIAGNOSIS — C34.11 SMALL CELL CARCINOMA OF UPPER LOBE OF RIGHT LUNG (HCC): ICD-10-CM

## 2025-03-25 DIAGNOSIS — G89.29 CHRONIC RIGHT-SIDED LOW BACK PAIN WITHOUT SCIATICA: ICD-10-CM

## 2025-03-25 PROBLEM — Z71.89 ADVANCE CARE PLANNING: Status: RESOLVED | Noted: 2025-03-25 | Resolved: 2025-03-25

## 2025-03-25 PROBLEM — E87.6 HYPOKALEMIA: Status: ACTIVE | Noted: 2025-03-25

## 2025-03-25 PROBLEM — R79.89 ELEVATED PROCALCITONIN: Status: ACTIVE | Noted: 2025-03-25

## 2025-03-25 PROBLEM — Z71.89 ADVANCE CARE PLANNING: Status: ACTIVE | Noted: 2025-03-25

## 2025-03-25 PROBLEM — N17.9 AKI (ACUTE KIDNEY INJURY) (HCC): Status: ACTIVE | Noted: 2025-03-25

## 2025-03-25 PROBLEM — J94.8 HYDROPNEUMOTHORAX: Status: ACTIVE | Noted: 2025-03-25

## 2025-03-25 PROBLEM — E78.2 MIXED HYPERLIPIDEMIA: Status: ACTIVE | Noted: 2025-03-25

## 2025-03-25 PROBLEM — I10 PRIMARY HYPERTENSION: Status: ACTIVE | Noted: 2025-03-25

## 2025-03-25 PROBLEM — E83.51 HYPOCALCEMIA: Status: ACTIVE | Noted: 2025-03-25

## 2025-03-25 LAB
25(OH)D3 SERPL-MCNC: 42 NG/ML (ref 30–100)
ALBUMIN SERPL BCP-MCNC: 3.6 G/DL (ref 3.2–4.9)
ALBUMIN/GLOB SERPL: 1.1 G/DL
ALP SERPL-CCNC: 84 U/L (ref 30–99)
ALT SERPL-CCNC: 12 U/L (ref 2–50)
ANION GAP SERPL CALC-SCNC: 22 MMOL/L (ref 7–16)
AST SERPL-CCNC: 46 U/L (ref 12–45)
BASOPHILS # BLD AUTO: 0.4 % (ref 0–1.8)
BASOPHILS # BLD: 0.04 K/UL (ref 0–0.12)
BILIRUB SERPL-MCNC: <0.2 MG/DL (ref 0.1–1.5)
BUN SERPL-MCNC: 107 MG/DL (ref 8–22)
CA-I SERPL-SCNC: 0.9 MMOL/L (ref 1.1–1.3)
CALCIUM ALBUM COR SERPL-MCNC: 7.1 MG/DL (ref 8.5–10.5)
CALCIUM SERPL-MCNC: 6.8 MG/DL (ref 8.5–10.5)
CHLORIDE SERPL-SCNC: 100 MMOL/L (ref 96–112)
CK SERPL-CCNC: 560 U/L (ref 0–154)
CO2 SERPL-SCNC: 15 MMOL/L (ref 20–33)
CREAT SERPL-MCNC: 2.01 MG/DL (ref 0.5–1.4)
EOSINOPHIL # BLD AUTO: 0.18 K/UL (ref 0–0.51)
EOSINOPHIL NFR BLD: 1.8 % (ref 0–6.9)
ERYTHROCYTE [DISTWIDTH] IN BLOOD BY AUTOMATED COUNT: 50.9 FL (ref 35.9–50)
GFR SERPLBLD CREATININE-BSD FMLA CKD-EPI: 25 ML/MIN/1.73 M 2
GLOBULIN SER CALC-MCNC: 3.3 G/DL (ref 1.9–3.5)
GLUCOSE SERPL-MCNC: 182 MG/DL (ref 65–99)
HCT VFR BLD AUTO: 43.9 % (ref 37–47)
HGB BLD-MCNC: 15.3 G/DL (ref 12–16)
IMM GRANULOCYTES # BLD AUTO: 0.05 K/UL (ref 0–0.11)
IMM GRANULOCYTES NFR BLD AUTO: 0.5 % (ref 0–0.9)
LACTATE SERPL-SCNC: 1.2 MMOL/L (ref 0.5–2)
LIPASE SERPL-CCNC: 64 U/L (ref 11–82)
LYMPHOCYTES # BLD AUTO: 0.54 K/UL (ref 1–4.8)
LYMPHOCYTES NFR BLD: 5.3 % (ref 22–41)
MAGNESIUM SERPL-MCNC: 2.4 MG/DL (ref 1.5–2.5)
MCH RBC QN AUTO: 31.6 PG (ref 27–33)
MCHC RBC AUTO-ENTMCNC: 34.9 G/DL (ref 32.2–35.5)
MCV RBC AUTO: 90.7 FL (ref 81.4–97.8)
MONOCYTES # BLD AUTO: 0.54 K/UL (ref 0–0.85)
MONOCYTES NFR BLD AUTO: 5.3 % (ref 0–13.4)
NEUTROPHILS # BLD AUTO: 8.87 K/UL (ref 1.82–7.42)
NEUTROPHILS NFR BLD: 86.7 % (ref 44–72)
NRBC # BLD AUTO: 0 K/UL
NRBC BLD-RTO: 0 /100 WBC (ref 0–0.2)
PLATELET # BLD AUTO: 285 K/UL (ref 164–446)
PMV BLD AUTO: 10.5 FL (ref 9–12.9)
POTASSIUM SERPL-SCNC: 3.2 MMOL/L (ref 3.6–5.5)
PROCALCITONIN SERPL-MCNC: 10.9 NG/ML
PROT SERPL-MCNC: 6.9 G/DL (ref 6–8.2)
PTH-INTACT SERPL-MCNC: 717 PG/ML (ref 14–72)
RBC # BLD AUTO: 4.84 M/UL (ref 4.2–5.4)
SODIUM SERPL-SCNC: 137 MMOL/L (ref 135–145)
WBC # BLD AUTO: 10.2 K/UL (ref 4.8–10.8)

## 2025-03-25 PROCEDURE — 700111 HCHG RX REV CODE 636 W/ 250 OVERRIDE (IP): Performed by: EMERGENCY MEDICINE

## 2025-03-25 PROCEDURE — 96375 TX/PRO/DX INJ NEW DRUG ADDON: CPT

## 2025-03-25 PROCEDURE — A9270 NON-COVERED ITEM OR SERVICE: HCPCS | Performed by: STUDENT IN AN ORGANIZED HEALTH CARE EDUCATION/TRAINING PROGRAM

## 2025-03-25 PROCEDURE — 700111 HCHG RX REV CODE 636 W/ 250 OVERRIDE (IP): Mod: JZ | Performed by: STUDENT IN AN ORGANIZED HEALTH CARE EDUCATION/TRAINING PROGRAM

## 2025-03-25 PROCEDURE — 82330 ASSAY OF CALCIUM: CPT

## 2025-03-25 PROCEDURE — 700105 HCHG RX REV CODE 258: Performed by: EMERGENCY MEDICINE

## 2025-03-25 PROCEDURE — 85025 COMPLETE CBC W/AUTO DIFF WBC: CPT

## 2025-03-25 PROCEDURE — 83970 ASSAY OF PARATHORMONE: CPT

## 2025-03-25 PROCEDURE — 700101 HCHG RX REV CODE 250: Performed by: STUDENT IN AN ORGANIZED HEALTH CARE EDUCATION/TRAINING PROGRAM

## 2025-03-25 PROCEDURE — 36415 COLL VENOUS BLD VENIPUNCTURE: CPT

## 2025-03-25 PROCEDURE — 99285 EMERGENCY DEPT VISIT HI MDM: CPT

## 2025-03-25 PROCEDURE — 83605 ASSAY OF LACTIC ACID: CPT

## 2025-03-25 PROCEDURE — 700105 HCHG RX REV CODE 258: Performed by: STUDENT IN AN ORGANIZED HEALTH CARE EDUCATION/TRAINING PROGRAM

## 2025-03-25 PROCEDURE — 770020 HCHG ROOM/CARE - TELE (206)

## 2025-03-25 PROCEDURE — 83735 ASSAY OF MAGNESIUM: CPT

## 2025-03-25 PROCEDURE — A9270 NON-COVERED ITEM OR SERVICE: HCPCS | Performed by: EMERGENCY MEDICINE

## 2025-03-25 PROCEDURE — 82306 VITAMIN D 25 HYDROXY: CPT

## 2025-03-25 PROCEDURE — 700102 HCHG RX REV CODE 250 W/ 637 OVERRIDE(OP): Performed by: EMERGENCY MEDICINE

## 2025-03-25 PROCEDURE — 82550 ASSAY OF CK (CPK): CPT

## 2025-03-25 PROCEDURE — 84145 PROCALCITONIN (PCT): CPT

## 2025-03-25 PROCEDURE — 99223 1ST HOSP IP/OBS HIGH 75: CPT | Performed by: STUDENT IN AN ORGANIZED HEALTH CARE EDUCATION/TRAINING PROGRAM

## 2025-03-25 PROCEDURE — 87040 BLOOD CULTURE FOR BACTERIA: CPT | Mod: 91

## 2025-03-25 PROCEDURE — 83690 ASSAY OF LIPASE: CPT

## 2025-03-25 PROCEDURE — 99254 IP/OBS CNSLTJ NEW/EST MOD 60: CPT | Performed by: SURGERY

## 2025-03-25 PROCEDURE — 80053 COMPREHEN METABOLIC PANEL: CPT

## 2025-03-25 PROCEDURE — 84100 ASSAY OF PHOSPHORUS: CPT

## 2025-03-25 PROCEDURE — 96374 THER/PROPH/DIAG INJ IV PUSH: CPT

## 2025-03-25 PROCEDURE — 700102 HCHG RX REV CODE 250 W/ 637 OVERRIDE(OP): Performed by: STUDENT IN AN ORGANIZED HEALTH CARE EDUCATION/TRAINING PROGRAM

## 2025-03-25 PROCEDURE — 80048 BASIC METABOLIC PNL TOTAL CA: CPT

## 2025-03-25 RX ORDER — AMOXICILLIN 250 MG
2 CAPSULE ORAL EVERY EVENING
Status: DISCONTINUED | OUTPATIENT
Start: 2025-03-25 | End: 2025-03-28

## 2025-03-25 RX ORDER — ONDANSETRON 4 MG/1
4 TABLET, ORALLY DISINTEGRATING ORAL EVERY 4 HOURS PRN
Status: DISCONTINUED | OUTPATIENT
Start: 2025-03-25 | End: 2025-04-01 | Stop reason: HOSPADM

## 2025-03-25 RX ORDER — AZITHROMYCIN 250 MG/1
250 TABLET, FILM COATED ORAL DAILY
Status: ON HOLD | COMMUNITY
Start: 2025-03-21 | End: 2025-04-01

## 2025-03-25 RX ORDER — IPRATROPIUM BROMIDE AND ALBUTEROL SULFATE 2.5; .5 MG/3ML; MG/3ML
3 SOLUTION RESPIRATORY (INHALATION)
Status: DISCONTINUED | OUTPATIENT
Start: 2025-03-25 | End: 2025-04-01 | Stop reason: HOSPADM

## 2025-03-25 RX ORDER — ENOXAPARIN SODIUM 100 MG/ML
40 INJECTION SUBCUTANEOUS
Status: DISCONTINUED | OUTPATIENT
Start: 2025-03-26 | End: 2025-03-25

## 2025-03-25 RX ORDER — POTASSIUM CHLORIDE 1500 MG/1
40 TABLET, EXTENDED RELEASE ORAL ONCE
Status: COMPLETED | OUTPATIENT
Start: 2025-03-25 | End: 2025-03-25

## 2025-03-25 RX ORDER — SODIUM CHLORIDE 9 MG/ML
1000 INJECTION, SOLUTION INTRAVENOUS ONCE
Status: COMPLETED | OUTPATIENT
Start: 2025-03-25 | End: 2025-03-25

## 2025-03-25 RX ORDER — POLYETHYLENE GLYCOL 3350 17 G/17G
1 POWDER, FOR SOLUTION ORAL
Status: DISCONTINUED | OUTPATIENT
Start: 2025-03-25 | End: 2025-03-28

## 2025-03-25 RX ORDER — LINEZOLID 2 MG/ML
600 INJECTION, SOLUTION INTRAVENOUS EVERY 12 HOURS
Status: DISCONTINUED | OUTPATIENT
Start: 2025-03-25 | End: 2025-03-25

## 2025-03-25 RX ORDER — ACETAMINOPHEN 325 MG/1
650 TABLET ORAL EVERY 6 HOURS PRN
Status: DISCONTINUED | OUTPATIENT
Start: 2025-03-25 | End: 2025-04-01 | Stop reason: HOSPADM

## 2025-03-25 RX ORDER — HYDROCODONE BITARTRATE AND ACETAMINOPHEN 5; 325 MG/1; MG/1
1 TABLET ORAL EVERY 6 HOURS PRN
Refills: 0 | Status: DISCONTINUED | OUTPATIENT
Start: 2025-03-25 | End: 2025-03-26

## 2025-03-25 RX ORDER — SODIUM CHLORIDE 9 MG/ML
1000 INJECTION, SOLUTION INTRAVENOUS ONCE
Status: DISCONTINUED | OUTPATIENT
Start: 2025-03-25 | End: 2025-03-25

## 2025-03-25 RX ORDER — LISINOPRIL 10 MG/1
10 TABLET ORAL DAILY
Status: ON HOLD | COMMUNITY
End: 2025-04-01

## 2025-03-25 RX ORDER — OXYCODONE AND ACETAMINOPHEN 5; 325 MG/1; MG/1
1 TABLET ORAL ONCE
Refills: 0 | Status: COMPLETED | OUTPATIENT
Start: 2025-03-25 | End: 2025-03-25

## 2025-03-25 RX ORDER — HEPARIN SODIUM 5000 [USP'U]/ML
5000 INJECTION, SOLUTION INTRAVENOUS; SUBCUTANEOUS EVERY 8 HOURS
Status: DISCONTINUED | OUTPATIENT
Start: 2025-03-26 | End: 2025-03-27

## 2025-03-25 RX ORDER — DEXAMETHASONE SODIUM PHOSPHATE 4 MG/ML
8 INJECTION, SOLUTION INTRA-ARTICULAR; INTRALESIONAL; INTRAMUSCULAR; INTRAVENOUS; SOFT TISSUE ONCE
Status: COMPLETED | OUTPATIENT
Start: 2025-03-25 | End: 2025-03-25

## 2025-03-25 RX ORDER — ONDANSETRON 2 MG/ML
4 INJECTION INTRAMUSCULAR; INTRAVENOUS EVERY 4 HOURS PRN
Status: DISCONTINUED | OUTPATIENT
Start: 2025-03-25 | End: 2025-04-01 | Stop reason: HOSPADM

## 2025-03-25 RX ORDER — SODIUM CHLORIDE 9 MG/ML
INJECTION, SOLUTION INTRAVENOUS CONTINUOUS
Status: DISCONTINUED | OUTPATIENT
Start: 2025-03-25 | End: 2025-03-27

## 2025-03-25 RX ORDER — ATORVASTATIN CALCIUM 10 MG/1
10 TABLET, FILM COATED ORAL NIGHTLY
Status: ON HOLD | COMMUNITY
End: 2025-04-01

## 2025-03-25 RX ORDER — HYDROMORPHONE HYDROCHLORIDE 1 MG/ML
0.5 INJECTION, SOLUTION INTRAMUSCULAR; INTRAVENOUS; SUBCUTANEOUS
Status: DISCONTINUED | OUTPATIENT
Start: 2025-03-25 | End: 2025-04-01 | Stop reason: HOSPADM

## 2025-03-25 RX ADMIN — OXYCODONE AND ACETAMINOPHEN 1 TABLET: 5; 325 TABLET ORAL at 16:54

## 2025-03-25 RX ADMIN — POTASSIUM CHLORIDE 40 MEQ: 1500 TABLET, EXTENDED RELEASE ORAL at 23:31

## 2025-03-25 RX ADMIN — DEXAMETHASONE SODIUM PHOSPHATE 8 MG: 4 INJECTION INTRA-ARTICULAR; INTRALESIONAL; INTRAMUSCULAR; INTRAVENOUS; SOFT TISSUE at 16:56

## 2025-03-25 RX ADMIN — FENTANYL CITRATE 50 MCG: 50 INJECTION, SOLUTION INTRAMUSCULAR; INTRAVENOUS at 16:18

## 2025-03-25 RX ADMIN — SODIUM CHLORIDE 1000 ML: 9 INJECTION, SOLUTION INTRAVENOUS at 18:37

## 2025-03-25 RX ADMIN — SODIUM CHLORIDE 980 ML: 9 INJECTION, SOLUTION INTRAVENOUS at 20:49

## 2025-03-25 RX ADMIN — HYDROCODONE BITARTRATE AND ACETAMINOPHEN 1 TABLET: 5; 325 TABLET ORAL at 20:10

## 2025-03-25 RX ADMIN — AMPICILLIN AND SULBACTAM 3 G: 1; 2 INJECTION, POWDER, FOR SOLUTION INTRAMUSCULAR; INTRAVENOUS at 20:56

## 2025-03-25 RX ADMIN — DOXYCYCLINE 100 MG: 100 INJECTION, POWDER, LYOPHILIZED, FOR SOLUTION INTRAVENOUS at 21:03

## 2025-03-25 SDOH — ECONOMIC STABILITY: TRANSPORTATION INSECURITY
IN THE PAST 12 MONTHS, HAS THE LACK OF TRANSPORTATION KEPT YOU FROM MEDICAL APPOINTMENTS OR FROM GETTING MEDICATIONS?: NO

## 2025-03-25 SDOH — ECONOMIC STABILITY: TRANSPORTATION INSECURITY
IN THE PAST 12 MONTHS, HAS LACK OF RELIABLE TRANSPORTATION KEPT YOU FROM MEDICAL APPOINTMENTS, MEETINGS, WORK OR FROM GETTING THINGS NEEDED FOR DAILY LIVING?: NO

## 2025-03-25 ASSESSMENT — SOCIAL DETERMINANTS OF HEALTH (SDOH)

## 2025-03-25 ASSESSMENT — PAIN DESCRIPTION - PAIN TYPE
TYPE: CHRONIC PAIN
TYPE: ACUTE PAIN
TYPE: CHRONIC PAIN
TYPE: ACUTE PAIN
TYPE: ACUTE PAIN

## 2025-03-25 ASSESSMENT — LIFESTYLE VARIABLES
TOTAL SCORE: 0
EVER FELT BAD OR GUILTY ABOUT YOUR DRINKING: NO
HAVE YOU EVER FELT YOU SHOULD CUT DOWN ON YOUR DRINKING: NO
TOTAL SCORE: 0
CONSUMPTION TOTAL: NEGATIVE
DOES PATIENT WANT TO STOP DRINKING: NO
AVERAGE NUMBER OF DAYS PER WEEK YOU HAVE A DRINK CONTAINING ALCOHOL: 0
HOW MANY TIMES IN THE PAST YEAR HAVE YOU HAD 5 OR MORE DRINKS IN A DAY: 0
TOTAL SCORE: 0
HAVE PEOPLE ANNOYED YOU BY CRITICIZING YOUR DRINKING: NO
ON A TYPICAL DAY WHEN YOU DRINK ALCOHOL HOW MANY DRINKS DO YOU HAVE: 0
EVER HAD A DRINK FIRST THING IN THE MORNING TO STEADY YOUR NERVES TO GET RID OF A HANGOVER: NO
ALCOHOL_USE: NO

## 2025-03-25 ASSESSMENT — COGNITIVE AND FUNCTIONAL STATUS - GENERAL
HELP NEEDED FOR BATHING: A LITTLE
TOILETING: A LITTLE
DRESSING REGULAR LOWER BODY CLOTHING: A LITTLE
SUGGESTED CMS G CODE MODIFIER MOBILITY: CJ
WALKING IN HOSPITAL ROOM: A LITTLE
CLIMB 3 TO 5 STEPS WITH RAILING: A LITTLE
SUGGESTED CMS G CODE MODIFIER DAILY ACTIVITY: CJ
DAILY ACTIVITIY SCORE: 21
MOBILITY SCORE: 22

## 2025-03-25 ASSESSMENT — PATIENT HEALTH QUESTIONNAIRE - PHQ9
2. FEELING DOWN, DEPRESSED, IRRITABLE, OR HOPELESS: NOT AT ALL
1. LITTLE INTEREST OR PLEASURE IN DOING THINGS: NOT AT ALL
SUM OF ALL RESPONSES TO PHQ9 QUESTIONS 1 AND 2: 0

## 2025-03-25 ASSESSMENT — FIBROSIS 4 INDEX: FIB4 SCORE: 3.68

## 2025-03-25 NOTE — ED NOTES
Lab called with critical result of calcium 6.8 at 1622. Critical lab result read back to lab.   Dr. Bassett notified of critical lab result at 1623.  Critical lab result read back by Dr. Bassett.

## 2025-03-25 NOTE — ED PROVIDER NOTES
ED Provider Note    CHIEF COMPLAINT  Chief Complaint   Patient presents with    Abdominal Pain     Pt BIB [facility] for L lower abdominal and back pain times one week. Pt Hx of lung cancer. Reporting 10/10 low back pain per arrival to ED.            TANJA/RODGER      Emily Barnes is a 80 y.o. female who presents with complaint of lower back pain.  She states that she has had right sided lower back pain has been going on for several weeks and now has increased in severity.  The pain is dull in nature,, she states she has been having it for several weeks and not months and states her primary care physician oncologist asked her to take ibuprofen.  Denies saddle anesthesia, fever, recent trauma, loss of sensation or strength to arms or legs.  The patient was found to have slightly low blood pressure initial EMS evaluation received slight fluids her blood pressure came back to normal.  She denies shortness of breath, fever, shakes, chills, sweats.  She has a history of lung cancer, she has not had chemotherapy in over 6 months, [place] shortness of breath.  She states that she knows that she has cancer, she is unsure of spread and is taking a break from chemotherapy.    PAST MEDICAL HISTORY   has a past medical history of Anemia (1962), Arthritis, Breast cancer (HCC), Cancer (HCC) (2011), Cancer (HCC) (2020), Cataract, COPD (chronic obstructive pulmonary disease) (HCC), Cough, Dental disorder, EMPHYSEMA, High cholesterol, Pain, Pain, Pneumonia (1948), Renal disorder (1997), Renal disorder, S/P radiation therapy, Shortness of breath (07/16/2024), Small cell lung cancer (HCC), Snoring, Sputum production, Urinary incontinence (07/16/2024), and Wheezing.    SURGICAL HISTORY   has a past surgical history that includes node biopsy sentinel (8/8/2011); tonsillectomy (1972); breast biopsy (8/23/2011); colonoscopy with biopsy (3/15/2012); radiation therapy plan simple; lumpectomy (8/8/2011); bronchoscopy,diagnostic (5/7/2020);  "endobronchial us add-on (5/7/2020); drain/inject large joint/bursa (Right, 8/4/2022); and bronchoscopy,diagnostic (N/A, 7/2/2024).    FAMILY HISTORY  Family History   Problem Relation Age of Onset    Stroke Other     Heart Disease Other     Cancer Mother         lung cancer    Heart Disease Father     Cancer Sister         lung cancer    Cancer Sister         lung cancer    Cancer Sister         unknown cancer       SOCIAL HISTORY  Social History     Tobacco Use    Smoking status: Every Day     Current packs/day: 0.75     Average packs/day: 2.0 packs/day for 63.2 years (125.5 ttl pk-yrs)     Types: Cigarettes     Start date: 1962    Smokeless tobacco: Never    Tobacco comments:     0.75 ppd currently   Vaping Use    Vaping status: Never Used   Substance and Sexual Activity    Alcohol use: Yes     Comment: 4 drinks/week    Drug use: No    Sexual activity: Not on file       CURRENT MEDICATIONS  Home Medications       Reviewed by Timur Smith (Pharmacy Tech) on 03/25/25 at 1725  Med List Status: Complete     Medication Last Dose Status   atorvastatin (LIPITOR) 10 MG Tab 3/18/2025 Active   azithromycin (ZITHROMAX) 250 MG Tab Unknown Active   lisinopril (PRINIVIL) 10 MG Tab 3/18/2025 Active                  Audit from Redirected Encounters    **Home medications have not yet been reviewed for this encounter**         ALLERGIES  Allergies   Allergen Reactions    Flu Virus Vaccine Hives       PHYSICAL EXAM  VITAL SIGNS: BP (!) 83/61   Pulse 85   Temp 36 °C (96.8 °F) (Temporal)   Resp 17   Ht 1.575 m (5' 2\")   Wt 59 kg (130 lb)   SpO2 96%   BMI 23.78 kg/m²      Nursing notes and vitals reviewed.  Constitutional: Well developed, Well nourished, No acute distress, Non-toxic appearance.   Eyes: PERRLA, EOMI, Conjunctiva normal, No discharge.   Cardiovascular: Normal heart rate, Normal rhythm, No murmurs, No rubs, No gallops.   Thorax & Lungs: No respiratory distress, diminished breath sounds in the right, no rales " or rhonchi   Abdomen: Bowel sounds normal, Soft, No tenderness, No guarding, No rebound, No masses, No pulsatile masses.   Skin: Warm, Dry, No erythema, No rash.   Musculoskeletal: Right paravertebral lumbar spine tenderness, stable deformity, no central spinous process tenderness  Extremities: No deformity, no edema, good range of motion range of motion upper lower extremes bilaterally  Neurologic: Alert & oriented x 3, no saddle anesthesia, leg lift 5/5 bilaterally, DTRs are 2/4 lower extremes bilaterally.        Normal sinus rhythm on monitor  I have independently interpreted this EKG  Results for orders placed or performed during the hospital encounter of 03/25/25   CBC with Differential    Collection Time: 03/25/25  3:13 PM   Result Value Ref Range    WBC 10.2 4.8 - 10.8 K/uL    RBC 4.84 4.20 - 5.40 M/uL    Hemoglobin 15.3 12.0 - 16.0 g/dL    Hematocrit 43.9 37.0 - 47.0 %    MCV 90.7 81.4 - 97.8 fL    MCH 31.6 27.0 - 33.0 pg    MCHC 34.9 32.2 - 35.5 g/dL    RDW 50.9 (H) 35.9 - 50.0 fL    Platelet Count 285 164 - 446 K/uL    MPV 10.5 9.0 - 12.9 fL    Neutrophils-Polys 86.70 (H) 44.00 - 72.00 %    Lymphocytes 5.30 (L) 22.00 - 41.00 %    Monocytes 5.30 0.00 - 13.40 %    Eosinophils 1.80 0.00 - 6.90 %    Basophils 0.40 0.00 - 1.80 %    Immature Granulocytes 0.50 0.00 - 0.90 %    Nucleated RBC 0.00 0.00 - 0.20 /100 WBC    Neutrophils (Absolute) 8.87 (H) 1.82 - 7.42 K/uL    Lymphs (Absolute) 0.54 (L) 1.00 - 4.80 K/uL    Monos (Absolute) 0.54 0.00 - 0.85 K/uL    Eos (Absolute) 0.18 0.00 - 0.51 K/uL    Baso (Absolute) 0.04 0.00 - 0.12 K/uL    Immature Granulocytes (abs) 0.05 0.00 - 0.11 K/uL    NRBC (Absolute) 0.00 K/uL   Complete Metabolic Panel    Collection Time: 03/25/25  3:13 PM   Result Value Ref Range    Sodium 137 135 - 145 mmol/L    Potassium 3.2 (L) 3.6 - 5.5 mmol/L    Chloride 100 96 - 112 mmol/L    Co2 15 (L) 20 - 33 mmol/L    Anion Gap 22.0 (H) 7.0 - 16.0    Glucose 182 (H) 65 - 99 mg/dL    Bun 107 (H) 8 -  22 mg/dL    Creatinine 2.01 (H) 0.50 - 1.40 mg/dL    Calcium 6.8 (LL) 8.5 - 10.5 mg/dL    Correct Calcium 7.1 (L) 8.5 - 10.5 mg/dL    AST(SGOT) 46 (H) 12 - 45 U/L    ALT(SGPT) 12 2 - 50 U/L    Alkaline Phosphatase 84 30 - 99 U/L    Total Bilirubin <0.2 0.1 - 1.5 mg/dL    Albumin 3.6 3.2 - 4.9 g/dL    Total Protein 6.9 6.0 - 8.2 g/dL    Globulin 3.3 1.9 - 3.5 g/dL    A-G Ratio 1.1 g/dL   Lipase    Collection Time: 03/25/25  3:13 PM   Result Value Ref Range    Lipase 64 11 - 82 U/L   ESTIMATED GFR    Collection Time: 03/25/25  3:13 PM   Result Value Ref Range    GFR (CKD-EPI) 25 (A) >60 mL/min/1.73 m 2   Magnesium    Collection Time: 03/25/25  3:13 PM   Result Value Ref Range    Magnesium 2.4 1.5 - 2.5 mg/dL   CREATINE KINASE    Collection Time: 03/25/25  3:13 PM   Result Value Ref Range    CPK Total 560 (H) 0 - 154 U/L   PROCALCITONIN    Collection Time: 03/25/25  3:13 PM   Result Value Ref Range    Procalcitonin 10.90 (HH) <0.25 ng/mL   PTH WITH IONIZED CALCIUM    Collection Time: 03/25/25  4:40 PM   Result Value Ref Range    Pth, Intact 717.0 (H) 14.0 - 72.0 pg/mL    Ionized Calcium 0.9 (L) 1.1 - 1.3 mmol/L   Lactic Acid    Collection Time: 03/25/25  6:46 PM   Result Value Ref Range    Lactic Acid 1.2 0.5 - 2.0 mmol/L       RADIOLOGY/PROCEDURES       Radiologist interpretation:  CT-LSPINE W/O PLUS RECONS   Final Result   Impression:      1. Osteopenia and advanced degenerative disc disease at L4-L5.   2. No definite fracture or malalignment.      CT-TSPINE W/O PLUS RECONS   Final Result      Multilevel degenerative disease without acute fracture or malalignment.      CT-CHEST,ABDOMEN,PELVIS W/O   Final Result      1.  Large right hydropneumothorax.      2.  Multifocal nodular masses in the right lower lobe. Additionally there are pleural-based masses posteriorly in the base of the right hemithorax. These findings are suspicious for metastatic disease.      3.  3.4 cm anterior mediastinal mass suspicious for  metastatic disease.      4.  Nonobstructing left upper pole nephrolithiasis.      5.  4.2 cm infrarenal aortic aneurysm.      6.  These findings were discussed with DUARTE FRY at 6:00 PM 3/25/2025          COURSE & MEDICAL DECISION MAKING    ASSESSMENT, COURSE AND PLAN  Care Narrative: This is a pleasant 80-year-old female who presents with right sided back pain.  She has no evidence of cauda equina syndrome, have no risk clinically or historical for epidural abscess, epidural hematoma.  CT scan chest abdomen pelvis were completed without contrast secondary to the JUDE and hypocalcemia, CT scan does reveal evidence of a very large right hydropneumothorax, multifocal nodular mass in the right lower lobe initially and there is a pleural-based mass posterior in the base of the right Haemate thorax suspicious for metastatic disease.  The patient does have other metastatic abnormalities.  She has an infrarenal aortic aneurysm 4.2 cm but no evidence of free fluid in the retroperitoneum.  I did discuss the patient with Dr. Momin, who reviewed the CT scan states at this point does not believe the patient requires an emergent tube thoracostomy.  I reached out to Dr. Omer who graciously except hospitalization of this patient.  For the pain medication here, the patient received fentanyl and Percocet, she did have a slight bout of hypotension but no evidence of sepsis with a normal lactic acid no [place] leukocytosis or evidence of infection.  Patient received IV fluids responded appropriately.  Addition, CT scans of the lumbar thoracic spine are negative for fracture, lytic lesions, surrounding edema.    CRITICAL CARE  The very real possibilty of a deterioration of this patient's condition required the highest level of my preparedness for sudden, emergent intervention.  I provided critical care services, which included medication orders, frequent reevaluations of the patient's condition and response to treatment, ordering  and reviewing test results, and discussing the case with various consultants.  The critical care time associated with the care of the patient was 35 minutes. Review chart for interventions. This time is exclusive of any other billable procedures.       ADDITIONAL PROBLEMS MANAGED  History of lung cancer, now with mass in the lung as well as nodularity with suspicious of metastatic disease    DISPOSITION AND DISCUSSIONS  I have discussed management of the patient with the following physicians and KEENAN's: Dr. Momin with surgery,  with hospitalist  Right low back pain  Hemopneumothorax of right  Intrathoracic mass  Critical care time 35 minutes     Electronically signed by: Javier Bassett D.O., 3/25/2025 3:57 PM

## 2025-03-25 NOTE — ED TRIAGE NOTES
"Chief Complaint   Patient presents with    Abdominal Pain     Pt BIB [facility] for L lower abdominal and back pain times one week. Pt Hx of lung cancer. Reporting 10/10 low back pain per arrival to ED.      Pt BIB via [facility] for above complaint. Pt experiencing L lower back and L lower abdominal pain for the past week. Reports \"for years its been 10/10 pain since having cancer.\" Per pt last cancer treatment over 3 months ago. Pt reporting 10/10 pain in lower back upon arrival to ED. Pt hypotensive with EMS en route with SBP of 75mmhg. Pt received 500ml NS bolus and 25mcg of fentanyl. A&Ox4, GCS 15. Pt placed on monitor, placed on 2L O2 NC for O2 sat of 91, pt does not wear oxygen at baseline.   "

## 2025-03-26 PROBLEM — R79.89 ELEVATED PROCALCITONIN: Status: RESOLVED | Noted: 2025-03-25 | Resolved: 2025-03-26

## 2025-03-26 PROBLEM — I71.43 INFRARENAL ABDOMINAL AORTIC ANEURYSM (AAA) WITHOUT RUPTURE (HCC): Status: ACTIVE | Noted: 2025-03-26

## 2025-03-26 PROBLEM — F17.200 SMOKING: Status: ACTIVE | Noted: 2024-06-24

## 2025-03-26 LAB
ANION GAP SERPL CALC-SCNC: 17 MMOL/L (ref 7–16)
ANION GAP SERPL CALC-SCNC: 21 MMOL/L (ref 7–16)
APPEARANCE UR: CLEAR
BACTERIA #/AREA URNS HPF: NORMAL /HPF
BILIRUB UR QL STRIP.AUTO: NEGATIVE
BUN SERPL-MCNC: 81 MG/DL (ref 8–22)
BUN SERPL-MCNC: 97 MG/DL (ref 8–22)
CALCIUM SERPL-MCNC: 6.7 MG/DL (ref 8.5–10.5)
CALCIUM SERPL-MCNC: 6.8 MG/DL (ref 8.5–10.5)
CASTS URNS QL MICRO: NORMAL /LPF (ref 0–2)
CHLORIDE SERPL-SCNC: 104 MMOL/L (ref 96–112)
CHLORIDE SERPL-SCNC: 110 MMOL/L (ref 96–112)
CO2 SERPL-SCNC: 11 MMOL/L (ref 20–33)
CO2 SERPL-SCNC: 14 MMOL/L (ref 20–33)
COLOR UR: YELLOW
CREAT SERPL-MCNC: 1.05 MG/DL (ref 0.5–1.4)
CREAT SERPL-MCNC: 1.56 MG/DL (ref 0.5–1.4)
EPITHELIAL CELLS 1715: NORMAL /HPF (ref 0–5)
ERYTHROCYTE [DISTWIDTH] IN BLOOD BY AUTOMATED COUNT: 50.6 FL (ref 35.9–50)
GFR SERPLBLD CREATININE-BSD FMLA CKD-EPI: 34 ML/MIN/1.73 M 2
GFR SERPLBLD CREATININE-BSD FMLA CKD-EPI: 54 ML/MIN/1.73 M 2
GLUCOSE SERPL-MCNC: 115 MG/DL (ref 65–99)
GLUCOSE SERPL-MCNC: 128 MG/DL (ref 65–99)
GLUCOSE UR STRIP.AUTO-MCNC: NEGATIVE MG/DL
HCT VFR BLD AUTO: 39.3 % (ref 37–47)
HGB BLD-MCNC: 13.2 G/DL (ref 12–16)
INR PPP: 1.54 (ref 0.87–1.13)
KETONES UR STRIP.AUTO-MCNC: ABNORMAL MG/DL
LEUKOCYTE ESTERASE UR QL STRIP.AUTO: ABNORMAL
MCH RBC QN AUTO: 30.4 PG (ref 27–33)
MCHC RBC AUTO-ENTMCNC: 33.6 G/DL (ref 32.2–35.5)
MCV RBC AUTO: 90.6 FL (ref 81.4–97.8)
MICRO URNS: ABNORMAL
NITRITE UR QL STRIP.AUTO: NEGATIVE
PH UR STRIP.AUTO: 5.5 [PH] (ref 5–8)
PHOSPHATE SERPL-MCNC: 4.3 MG/DL (ref 2.5–4.5)
PLATELET # BLD AUTO: 244 K/UL (ref 164–446)
PMV BLD AUTO: 10.5 FL (ref 9–12.9)
POTASSIUM SERPL-SCNC: 3.2 MMOL/L (ref 3.6–5.5)
POTASSIUM SERPL-SCNC: 3.4 MMOL/L (ref 3.6–5.5)
PROCALCITONIN SERPL-MCNC: 3.4 NG/ML
PROT UR QL STRIP: 30 MG/DL
PROTHROMBIN TIME: 18.5 SEC (ref 12–14.6)
RBC # BLD AUTO: 4.34 M/UL (ref 4.2–5.4)
RBC # URNS HPF: NORMAL /HPF (ref 0–2)
RBC UR QL AUTO: ABNORMAL
SCCMEC + MECA PNL NOSE NAA+PROBE: NORMAL
SODIUM SERPL-SCNC: 136 MMOL/L (ref 135–145)
SODIUM SERPL-SCNC: 141 MMOL/L (ref 135–145)
SP GR UR STRIP.AUTO: 1.01
UROBILINOGEN UR STRIP.AUTO-MCNC: 1 EU/DL
WBC # BLD AUTO: 8.1 K/UL (ref 4.8–10.8)
WBC #/AREA URNS HPF: NORMAL /HPF

## 2025-03-26 PROCEDURE — A9270 NON-COVERED ITEM OR SERVICE: HCPCS | Performed by: STUDENT IN AN ORGANIZED HEALTH CARE EDUCATION/TRAINING PROGRAM

## 2025-03-26 PROCEDURE — 87081 CULTURE SCREEN ONLY: CPT

## 2025-03-26 PROCEDURE — 700105 HCHG RX REV CODE 258: Performed by: STUDENT IN AN ORGANIZED HEALTH CARE EDUCATION/TRAINING PROGRAM

## 2025-03-26 PROCEDURE — 80048 BASIC METABOLIC PNL TOTAL CA: CPT

## 2025-03-26 PROCEDURE — 700102 HCHG RX REV CODE 250 W/ 637 OVERRIDE(OP): Mod: JZ | Performed by: INTERNAL MEDICINE

## 2025-03-26 PROCEDURE — 700102 HCHG RX REV CODE 250 W/ 637 OVERRIDE(OP): Performed by: NURSE PRACTITIONER

## 2025-03-26 PROCEDURE — 700111 HCHG RX REV CODE 636 W/ 250 OVERRIDE (IP): Mod: JZ | Performed by: STUDENT IN AN ORGANIZED HEALTH CARE EDUCATION/TRAINING PROGRAM

## 2025-03-26 PROCEDURE — 85027 COMPLETE CBC AUTOMATED: CPT

## 2025-03-26 PROCEDURE — 306580 SET INFUSION,POWERLOC 20G X.75: Performed by: STUDENT IN AN ORGANIZED HEALTH CARE EDUCATION/TRAINING PROGRAM

## 2025-03-26 PROCEDURE — 99231 SBSQ HOSP IP/OBS SF/LOW 25: CPT | Performed by: NURSE PRACTITIONER

## 2025-03-26 PROCEDURE — 700111 HCHG RX REV CODE 636 W/ 250 OVERRIDE (IP): Mod: JZ | Performed by: INTERNAL MEDICINE

## 2025-03-26 PROCEDURE — 700102 HCHG RX REV CODE 250 W/ 637 OVERRIDE(OP)

## 2025-03-26 PROCEDURE — 700101 HCHG RX REV CODE 250: Performed by: STUDENT IN AN ORGANIZED HEALTH CARE EDUCATION/TRAINING PROGRAM

## 2025-03-26 PROCEDURE — 99233 SBSQ HOSP IP/OBS HIGH 50: CPT | Mod: 25 | Performed by: INTERNAL MEDICINE

## 2025-03-26 PROCEDURE — 81001 URINALYSIS AUTO W/SCOPE: CPT

## 2025-03-26 PROCEDURE — A9270 NON-COVERED ITEM OR SERVICE: HCPCS | Mod: JZ | Performed by: INTERNAL MEDICINE

## 2025-03-26 PROCEDURE — 99497 ADVNCD CARE PLAN 30 MIN: CPT | Mod: 25 | Performed by: INTERNAL MEDICINE

## 2025-03-26 PROCEDURE — 85610 PROTHROMBIN TIME: CPT

## 2025-03-26 PROCEDURE — 84145 PROCALCITONIN (PCT): CPT

## 2025-03-26 PROCEDURE — 700102 HCHG RX REV CODE 250 W/ 637 OVERRIDE(OP): Performed by: STUDENT IN AN ORGANIZED HEALTH CARE EDUCATION/TRAINING PROGRAM

## 2025-03-26 PROCEDURE — 700105 HCHG RX REV CODE 258: Performed by: INTERNAL MEDICINE

## 2025-03-26 PROCEDURE — 770020 HCHG ROOM/CARE - TELE (206)

## 2025-03-26 PROCEDURE — A9270 NON-COVERED ITEM OR SERVICE: HCPCS | Performed by: NURSE PRACTITIONER

## 2025-03-26 PROCEDURE — 87641 MR-STAPH DNA AMP PROBE: CPT

## 2025-03-26 PROCEDURE — A9270 NON-COVERED ITEM OR SERVICE: HCPCS

## 2025-03-26 RX ORDER — CALCIUM GLUCONATE 20 MG/ML
2 INJECTION, SOLUTION INTRAVENOUS ONCE
Status: COMPLETED | OUTPATIENT
Start: 2025-03-26 | End: 2025-03-26

## 2025-03-26 RX ORDER — POTASSIUM CHLORIDE 1500 MG/1
40 TABLET, EXTENDED RELEASE ORAL ONCE
Status: COMPLETED | OUTPATIENT
Start: 2025-03-26 | End: 2025-03-26

## 2025-03-26 RX ORDER — SODIUM BICARBONATE 650 MG/1
650 TABLET ORAL 3 TIMES DAILY
Status: DISCONTINUED | OUTPATIENT
Start: 2025-03-26 | End: 2025-03-29

## 2025-03-26 RX ORDER — OXYCODONE HYDROCHLORIDE 5 MG/1
5 TABLET ORAL EVERY 4 HOURS PRN
Refills: 0 | Status: DISCONTINUED | OUTPATIENT
Start: 2025-03-26 | End: 2025-03-27

## 2025-03-26 RX ORDER — CALCIUM CARBONATE 500 MG/1
1000 TABLET, CHEWABLE ORAL 3 TIMES DAILY
Status: DISPENSED | OUTPATIENT
Start: 2025-03-26 | End: 2025-03-26

## 2025-03-26 RX ADMIN — HYDROMORPHONE HYDROCHLORIDE 0.5 MG: 1 INJECTION, SOLUTION INTRAMUSCULAR; INTRAVENOUS; SUBCUTANEOUS at 17:12

## 2025-03-26 RX ADMIN — ACETAMINOPHEN 650 MG: 325 TABLET ORAL at 09:14

## 2025-03-26 RX ADMIN — OXYCODONE 5 MG: 5 TABLET ORAL at 20:04

## 2025-03-26 RX ADMIN — SODIUM BICARBONATE 650 MG: 650 TABLET ORAL at 15:50

## 2025-03-26 RX ADMIN — HYDROMORPHONE HYDROCHLORIDE 0.5 MG: 1 INJECTION, SOLUTION INTRAMUSCULAR; INTRAVENOUS; SUBCUTANEOUS at 09:12

## 2025-03-26 RX ADMIN — AMPICILLIN AND SULBACTAM 3 G: 1; 2 INJECTION, POWDER, FOR SOLUTION INTRAMUSCULAR; INTRAVENOUS at 17:01

## 2025-03-26 RX ADMIN — DOXYCYCLINE 100 MG: 100 INJECTION, POWDER, LYOPHILIZED, FOR SOLUTION INTRAVENOUS at 17:08

## 2025-03-26 RX ADMIN — SODIUM CHLORIDE 1000 ML: 9 INJECTION, SOLUTION INTRAVENOUS at 03:08

## 2025-03-26 RX ADMIN — POTASSIUM CHLORIDE 40 MEQ: 1500 TABLET, EXTENDED RELEASE ORAL at 05:44

## 2025-03-26 RX ADMIN — SODIUM CHLORIDE: 9 INJECTION, SOLUTION INTRAVENOUS at 09:12

## 2025-03-26 RX ADMIN — ANTACID TABLETS 1000 MG: 500 TABLET, CHEWABLE ORAL at 05:44

## 2025-03-26 RX ADMIN — ANTACID TABLETS 1000 MG: 500 TABLET, CHEWABLE ORAL at 01:03

## 2025-03-26 RX ADMIN — Medication 5 MG: at 21:13

## 2025-03-26 RX ADMIN — SODIUM BICARBONATE 650 MG: 650 TABLET ORAL at 20:04

## 2025-03-26 RX ADMIN — HYDROCODONE BITARTRATE AND ACETAMINOPHEN 1 TABLET: 5; 325 TABLET ORAL at 09:15

## 2025-03-26 RX ADMIN — CALCIUM GLUCONATE 2 G: 20 INJECTION, SOLUTION INTRAVENOUS at 09:36

## 2025-03-26 RX ADMIN — AMPICILLIN AND SULBACTAM 3 G: 1; 2 INJECTION, POWDER, FOR SOLUTION INTRAMUSCULAR; INTRAVENOUS at 00:20

## 2025-03-26 RX ADMIN — DOXYCYCLINE 100 MG: 100 INJECTION, POWDER, LYOPHILIZED, FOR SOLUTION INTRAVENOUS at 05:44

## 2025-03-26 RX ADMIN — AMPICILLIN AND SULBACTAM 3 G: 1; 2 INJECTION, POWDER, FOR SOLUTION INTRAMUSCULAR; INTRAVENOUS at 12:40

## 2025-03-26 RX ADMIN — AMPICILLIN AND SULBACTAM 3 G: 1; 2 INJECTION, POWDER, FOR SOLUTION INTRAMUSCULAR; INTRAVENOUS at 05:33

## 2025-03-26 RX ADMIN — MOMETASONE FUROATE AND FORMOTEROL FUMARATE DIHYDRATE 2 PUFF: 100; 5 AEROSOL RESPIRATORY (INHALATION) at 17:14

## 2025-03-26 RX ADMIN — POTASSIUM CHLORIDE 40 MEQ: 1500 TABLET, EXTENDED RELEASE ORAL at 09:16

## 2025-03-26 RX ADMIN — HYDROCODONE BITARTRATE AND ACETAMINOPHEN 1 TABLET: 5; 325 TABLET ORAL at 15:50

## 2025-03-26 RX ADMIN — CALCIUM GLUCONATE 2 G: 20 INJECTION, SOLUTION INTRAVENOUS at 03:17

## 2025-03-26 RX ADMIN — SENNOSIDES AND DOCUSATE SODIUM 2 TABLET: 50; 8.6 TABLET ORAL at 16:59

## 2025-03-26 RX ADMIN — HYDROMORPHONE HYDROCHLORIDE 0.5 MG: 1 INJECTION, SOLUTION INTRAMUSCULAR; INTRAVENOUS; SUBCUTANEOUS at 21:13

## 2025-03-26 ASSESSMENT — ENCOUNTER SYMPTOMS
PHOTOPHOBIA: 0
DIARRHEA: 0
NAUSEA: 0
FEVER: 0
ORTHOPNEA: 0
PALPITATIONS: 0
SPEECH CHANGE: 0
HEMOPTYSIS: 0
CHILLS: 0
VOMITING: 0
WEAKNESS: 0
DIZZINESS: 0
BACK PAIN: 1
COUGH: 0
CLAUDICATION: 0
DOUBLE VISION: 0
WEIGHT LOSS: 1
BLURRED VISION: 0
ABDOMINAL PAIN: 0
MYALGIAS: 0
CONSTIPATION: 0
NECK PAIN: 0

## 2025-03-26 ASSESSMENT — PAIN DESCRIPTION - PAIN TYPE
TYPE: CHRONIC PAIN
TYPE: ACUTE PAIN
TYPE: CHRONIC PAIN
TYPE: ACUTE PAIN
TYPE: CHRONIC PAIN

## 2025-03-26 ASSESSMENT — FIBROSIS 4 INDEX: FIB4 SCORE: 3.68

## 2025-03-26 NOTE — CARE PLAN
Problem: Pain - Standard  Goal: Alleviation of pain or a reduction in pain to the patient’s comfort goal  Outcome: Progressing     Problem: Knowledge Deficit - Standard  Goal: Patient and family/care givers will demonstrate understanding of plan of care, disease process/condition, diagnostic tests and medications  Outcome: Progressing     Problem: Fall Risk  Goal: Patient will remain free from falls  Outcome: Progressing   The patient is Stable - Low risk of patient condition declining or worsening    Shift Goals  Clinical Goals: Remain free of falls, no skin breakdown  Patient Goals: Rest  Family Goals: WILLIE    Progress made toward(s) clinical / shift goals:      Pt educated on plan of care, pt verbalizes understanding, reinforcement needed. Pt educated on pain/anxiety scales, alleviating factors, pain/anxiety medications, and side effects, and need for pain/anxiety reassessment, pt pain/anxiety controlled at this time, reinforcement needed. Pt educated on the need to reposition frequently and remain dry to avoid skin breakdown, reinforcement needed, no new skin breakdown during shift. Fall risk education provided to pt, pt educated about the need to call for assistance while attempting to ambulate, reinforcement needed, pt remains free of falls this shift

## 2025-03-26 NOTE — PROGRESS NOTES
4 Eyes Skin Assessment Completed by KHALIF Delgado and DIVINA Greco.    Head WDL  Ears Redness and Blanching  Nose WDL  Mouth WDL  Neck WDL  Breast/Chest Port  Shoulder Blades Scab  Spine WDL  (R) Arm/Elbow/Hand Redness and Blanching  (L) Arm/Elbow/Hand Redness and Blanching  Abdomen WDL  Groin WDL  Scrotum/Coccyx/Buttocks Redness and Blanching  (R) Leg WDL  (L) Leg WDL  (R) Heel/Foot/Toe Redness and Blanching  (L) Heel/Foot/Toe Redness and Blanching          Devices In Places Tele Box, Blood Pressure Cuff, and Pulse Ox      Interventions In Place Pillows and Pressure Redistribution Mattress    Possible Skin Injury No    Pictures Uploaded Into Epic N/A  Wound Consult Placed N/A  RN Wound Prevention Protocol Ordered Yes

## 2025-03-26 NOTE — ASSESSMENT & PLAN NOTE
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  Hospice referral  Comfort care while awaiting Pleurx placement and discharging home on hospice

## 2025-03-26 NOTE — PROGRESS NOTES
Report received from ED RN  Bharat, RN. Per ED RN pt is currently A&Ox4, SR.   IVF/IV medications: Not Applicable   Oxygen: Room Air    Chart review completed  Transport arranged with RN.

## 2025-03-26 NOTE — ED NOTES
Alfred rec updated and complete. Allergies reviewed.      Pt confirmed name and date of birth.    Pt denies anticoagulant and antiplatelet medications.   Pt picked up a prescription for a zpak. On 03/21/25. Pt stated she did not start the antibiotic.    Preferred Pharmacy  [facility] = [phone]

## 2025-03-26 NOTE — PROGRESS NOTES
Report received from night RN at 0700. PT seen at bedside and pt care assumed. Pt is A&Ox4,  on RA, states pain 3/10. PIV flushed and intact. PT is SR on telemetry. PT is x1 assist.     Plan of care reviewed with pt, call light, phone, and personal belongings within reach. Bed alarm on, and bed in low locked position. All pt's needs met at this time.    Bedside report received from off going RN/tech: KHALIF Delgado, assumed care of patient.     Fall Risk Score: HIGH RISK  Fall risk interventions in place: Place yellow fall risk ID band on patient, Provide patient/family education based on risk assessment, Educate patient/family to call staff for assistance when getting out of bed, Place fall precaution signage outside patient door, Utilize bed/chair fall alarm, and Bed alarm connected correctly  Bed type: Regular (Romario Score less than 17 interventions in place)  Patient on cardiac monitor: Yes  IVF/IV medications: Infusion per MAR (List Med(s)) NS @ 200 ml/hr  Oxygen: Room Air and No oxygen tank in room  Bedside sitter: Not Applicable   Isolation: Not applicable

## 2025-03-26 NOTE — CARE PLAN
The patient is Watcher - Medium risk of patient condition declining or worsening    Shift Goals  Clinical Goals: [place] to unit, VSS, comfort, monitor labs  Patient Goals: rest  Family Goals: emeterio    Progress made toward(s) clinical / shift goals:    Problem: Pain - Standard  Goal: Alleviation of pain or a reduction in pain to the patient’s comfort goal  Outcome: Progressing  Note: Pt reports pain well controlled with current therapy. Additional non-pharmacologic interventions implemented. Education on pain reduction goals, pain rating scale, and potential side effects of pharmacologic interventions. Demonstrates use of appropriate diversional activities and/or relaxation techniques.       Problem: Knowledge Deficit - Standard  Goal: Patient and family/care givers will demonstrate understanding of plan of care, disease process/condition, diagnostic tests and medications  Outcome: Progressing  Note: Discussed plan of care, pt able to actively participate in the learning process and demonstrates understanding by return demonstration or return explanation. Improved ability to communicate regarding their healthcare and current admission. Improved ability to identify barriers to learning and care.       Problem: Fall Risk  Goal: Patient will remain free from falls  Outcome: Progressing  Note: Fall prevention measures in place, bed alarm on and connected correctly, call light within reach, hourly rounding, Education provided on fall prevention. Pt instructed to use call light prior to exiting the bed, educated on use of bed alarms, and risks and complications related to falls. Medication orders evaluated for fall risk, gait/mobility assessed, sensory deficits assessed, mental status assessed, assessed for hypotension, hypovolemia, weakness, fatigue, elimination, and confusion.         Patient is not progressing towards the following goals:

## 2025-03-26 NOTE — PROGRESS NOTES
RN notified by phone of critical result: Ca 6.7. Pt assessed and found to be asymptomatic. Hospitalist notified. Rx administered as ordered.

## 2025-03-26 NOTE — THERAPY
Speech Language Therapy Contact Note    Patient Name: Emily Barnes  Age:  80 y.o., Sex:  female  Medical Record #: [id number]  Today's Date: 3/26/2025    Pt is NPO for possibly drainage w/ surgery today- ST to hold pending bedside swallow eval

## 2025-03-26 NOTE — PROGRESS NOTES
"  DATE: 3/26/2025    Hospital Day1  hydropneumothorax .    INTERVAL EVENTS:      PHYSICAL EXAMINATION:  Vital Signs: /62   Pulse 82   Temp 36.6 °C (97.9 °F) (Temporal)   Resp 18   Ht 1.6 m (5' 3\")   Wt 58 kg (127 lb 13.9 oz)   SpO2 92%   Physical Exam  Vitals and nursing note reviewed.   Constitutional:       General: She is not in acute distress.     Comments: Resting, rouses easily    HENT:      Head: Normocephalic.   Pulmonary:      Effort: Pulmonary effort is normal. No respiratory distress.      Comments: Right chest port   Room air   Chest:      Chest wall: No tenderness.   Skin:     General: Skin is warm and dry.      Capillary Refill: Capillary refill takes less than 2 seconds.   Neurological:      General: No focal deficit present.      Mental Status: She is alert and oriented to person, place, and time.   Psychiatric:         Mood and Affect: Mood normal.         Behavior: Behavior normal.         Thought Content: Thought content normal.         ASSESSMENT AND PLAN:  A thoracostomy tube may drain a malignant effusion indefinitely, since this is non-urgent recommend discussion of Pleur-X catheter with IR. However this decision should also involve however is globally managing her cancer care.  ASC available to urgent thoracostomy tube placement should she decompensate.    ACS follow peripherally.         ____________________________________     SAMINA Alfredo    DD: 3/26/2025  2:06 PM    "

## 2025-03-26 NOTE — ASSESSMENT & PLAN NOTE
3/25  Patient was alert and oriented x 4, plan of care was discussed in detail with the patient and her niece on the phone, answered all her questions, discussed the advanced cancer, discussed all treatment options including hospice and comfort care, patient dictated that she wants to be DNR/DNI however refused hospice at this time, have more conversation tomorrow.  Niece states she is moving to [place] to be next to her.  Time 30-minute.    3/27   again the plan of care was discussed with the patient and palliative at bedside, answered all her questions, the patient agreed for hospice referral, DNR/DNI, patient wants to pain medication, she states she is always in pain, also palliative team discussed the case with the niece the power of  and agreed for hospice referral, time 25 minutes    3/29  Patient agreeable to comfort care

## 2025-03-26 NOTE — ED NOTES
Lab called with critical result of Procalcitonin 10.9 at 1927. Critical lab result read back to Lab.   Dr. Omer notified of critical lab result at 1930.  Critical lab result read back by Dr. Omer.

## 2025-03-26 NOTE — HOSPITAL COURSE
The patient was admitted for acute hypoxemic respiratory failure in the setting of stage IV small cell lung cancer. She underwent imaging that revealed a large right hydropneumothorax. She underwent thoracentesis with 1 L of fluid removed.  Her thoracentesis fluid was positive for malignant cells.    This case was discussed with medical oncology that was concerned that her current therapy is not working.  Additionally, she demonstrated a poor performance status and overall poor tolerability of aggressive therapies.  Medical oncology does not think the patient would tolerate any additional chemotherapy and recommended hospice and comfort care.    The patient discussed end-of-life care with palliative care and agreed to be discharged home on hospice.  She elected to transition to comfort care status while in the hospital.  The patient requested a Pleurx to be placed prior to discharge for her comfort.  This will coordinate with interventional radiology on the day of discharge.

## 2025-03-26 NOTE — H&P
Hospital Medicine History & Physical Note    Date of Service  3/26/2025    Primary Care Physician  Osei CABRERA M.D.    Consultants  general surgery    Specialist Names: Dr. Ramon Momin    Code Status  DNR    Chief Complaint  Chief Complaint   Patient presents with    Abdominal Pain     Pt BIB [facility] for L lower abdominal and back pain times one week. Pt Hx of lung cancer. Reporting 10/10 low back pain per arrival to ED.        History of Presenting Illness  Emily Barnes is a 80 y.o. 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  She lives alone and hasn't eaten in 9 days and overall feels weak  Does report Consitpation, last BM this am.  Denies any abdominal pain.  No saddle anesthesia/bowel bladder symptoms.  No fever, chills, sob, chest pain.    In the ED, her blood pressure is 80/52 initially, breathing on room air, afebrile.  WBC of 10.2 (with baseline WBC 4-7 transit close, left shift.  Potassium of 3.2, bicarb of 15, anion gap of 22, BUN of 107, creatinine of 2.01, calcium of 6.8 corrected calcium of 7.1, , lactate of 1.2 procalcitonin of 10.90 elevated PTH.    CT chest showed large right-sided hydropneumothorax, multifocal nodular masses in the right lower lobe, pleural-based masses posteriorly in the right hemithorax-suggesting metastatic disease.  Mediastinal mass suspicious for metastatic disease, nonobstructing left upper pole nephrolithiasis, 4.2 cm infrarenal aortic aneurysm    In the ED she received Decadron 8 Mg IV, fentanyl, 1 L of  bolus.  ERP did consult surgeon-Dr. CLEVELAND, who plans on evaluating patient's ongoing probable chronic right-sided hydropneumothorax in the a.m.    I am being requested to admit for JUDE, hypocalcemia, right sided hydropneumothorax.      I discussed the plan of care with patient and bedside RN.    Review of Systems  ROS  As noted in HPI      Past Medical History   has a past medical history of Anemia (1962), Arthritis, Breast cancer (HCC), Cancer (HCC) (2011), Cancer (HCC) (2020), Cataract, COPD (chronic obstructive pulmonary disease) (HCC), Cough, Dental disorder, EMPHYSEMA, High cholesterol, Pain, Pain, Pneumonia (1948), Renal disorder (1997), Renal disorder, S/P radiation therapy, Shortness of breath (07/16/2024), Small cell lung cancer (HCC), Snoring, Sputum production, Urinary incontinence (07/16/2024), and Wheezing.    Surgical History   has a past surgical history that includes node biopsy sentinel (8/8/2011); tonsillectomy (1972); breast biopsy (8/23/2011); colonoscopy with biopsy (3/15/2012); pr radiation therapy plan simple; lumpectomy (8/8/2011); pr bronchoscopy,diagnostic (5/7/2020); endobronchial us add-on (5/7/2020); pr drain/inject large joint/bursa (Right, 8/4/2022); and pr bronchoscopy,diagnostic (N/A, 7/2/2024).     Family History  family history includes Cancer in her mother, sister, sister, and sister; Heart Disease in her father and another family member; Stroke in an other family member.   Family history reviewed with patient. There is no family history that is pertinent to the chief complaint.     Social History   reports that she has been smoking cigarettes. She started smoking about 63 years ago. She has a 125.5 pack-year smoking history. She has never used smokeless tobacco. She reports current alcohol use. She reports that she does not use drugs.    Allergies  Allergies   Allergen Reactions    Flu Virus Vaccine Hives       Medications  Prior to Admission Medications   Prescriptions Last Dose  Informant Patient Reported? Taking?   atorvastatin (LIPITOR) 10 MG Tab 3/18/2025 Evening Patient Yes Yes   Sig: Take 10 mg by mouth every evening.   azithromycin (ZITHROMAX) 250 MG Tab Unknown Patient Yes No   Sig: Take 250 mg by mouth every day.   lisinopril (PRINIVIL) 10 MG Tab 3/18/2025 Morning Patient Yes Yes   Sig: Take 10 mg by mouth every day.      Facility-Administered Medications: None       Physical Exam  Temp:  [36 °C (96.8 °F)] 36 °C (96.8 °F)  Pulse:  [75-97] 89  Resp:  [13-21] 18  BP: ()/(51-61) 109/55  SpO2:  [90 %-96 %] 93 %  Blood Pressure : (!) 85/53   Temperature: 36 °C (96.8 °F)   Pulse: 90   Respiration: 20   Pulse Oximetry: 95 %       Physical Exam  Constitutional:       Appearance: She is ill-appearing.   HENT:      Head: Normocephalic.      Mouth/Throat:      Mouth: Mucous membranes are dry.   Eyes:      Pupils: Pupils are equal, round, and reactive to light.   Cardiovascular:      Rate and Rhythm: Normal rate and regular rhythm.      Pulses: Normal pulses.      Heart sounds: Normal heart sounds.   Pulmonary:      Effort: Pulmonary effort is normal. No respiratory distress (Episodes of dry coughing, on RA, diminished breath sounds on right).      Breath sounds: Normal breath sounds.      Comments: Port in place on rright upper chest, Surgical scar noted on right breast.   Abdominal:      General: Abdomen is flat. Bowel sounds are normal.      Palpations: Abdomen is soft.   Musculoskeletal:         General: Normal range of motion.      Cervical back: Normal range of motion.      Right lower leg: No edema.      Left lower leg: No edema.   Skin:     General: Skin is warm.      Capillary Refill: Capillary refill takes less than 2 seconds.   Neurological:      General: No focal deficit present.      Mental Status: She is alert and oriented to person, place, and time. Mental status is at baseline.   Psychiatric:         Mood and Affect: Mood normal.         Laboratory:  Recent Labs      "03/25/25  1513   WBC 10.2   RBC 4.84   HEMOGLOBIN 15.3   HEMATOCRIT 43.9   MCV 90.7   MCH 31.6   MCHC 34.9   RDW 50.9*   PLATELETCT 285   MPV 10.5     Recent Labs     03/25/25  1513 03/25/25  2314   SODIUM 137 136   POTASSIUM 3.2* 3.4*   CHLORIDE 100 104   CO2 15* 11*   GLUCOSE 182* 115*   * 97*   CREATININE 2.01* 1.56*   CALCIUM 6.8* 6.7*     Recent Labs     03/25/25  1513 03/25/25  2314   ALTSGPT 12  --    ASTSGOT 46*  --    ALKPHOSPHAT 84  --    TBILIRUBIN <0.2  --    LIPASE 64  --    GLUCOSE 182* 115*         No results for input(s): \"NTPROBNP\" in the last 72 hours.      No results for input(s): \"TROPONINT\" in the last 72 hours.    Imaging:  CT-LSPINE W/O PLUS RECONS   Final Result   Impression:      1. Osteopenia and advanced degenerative disc disease at L4-L5.   2. No definite fracture or malalignment.      CT-TSPINE W/O PLUS RECONS   Final Result      Multilevel degenerative disease without acute fracture or malalignment.      CT-CHEST,ABDOMEN,PELVIS W/O   Final Result      1.  Large right hydropneumothorax.      2.  Multifocal nodular masses in the right lower lobe. Additionally there are pleural-based masses posteriorly in the base of the right hemithorax. These findings are suspicious for metastatic disease.      3.  3.4 cm anterior mediastinal mass suspicious for metastatic disease.      4.  Nonobstructing left upper pole nephrolithiasis.      5.  4.2 cm infrarenal aortic aneurysm.      6.  These findings were discussed with DUARTE FRY at 6:00 PM 3/25/2025          no X-Ray or EKG requiring interpretation    Assessment/Plan:  Justification for Admission Status  I anticipate this patient will require at least two midnights for appropriate medical management, necessitating inpatient admission because for further evaluation of ongoing JUDE, hypocalcemia and right-sided pneumothorax with IV fluids, plus surgical consultation    Patient will need a Telemetry bed on MEDICAL service .  The need " is secondary to above.    * JUDE (acute kidney injury) (HCC)- (present on admission)  Assessment & Plan  Most likely prerenal.  Patient reports not eating or drinking well for the past 9-10 days    -Check UA, CK, as needed bladder scan  -Patient is a 1 L of IV fluid in the ED, continue NS at 150 mL an hour.  Will have to watch her sats and she already has hydropneumothorax.  -Avoid nephrotoxins, monitor labs in the a.m.    Hydropneumothorax  Assessment & Plan  Large right sided  Prob acute on chronic from ongoing malignancy  Irritability spoke with surgeon-Dr. Momin, who plans on seeing patient in the a.m. for possible drainage.  -Patient n.p.o. after midnight, hold onto anticoagulation/DVT prophylaxis    Hypocalcemia  Assessment & Plan  Prob from low nutrional intake? Low vit D?  - PTH high  - Check Vit D, check Mag and phos  - Supplement and monitor labs    Infrarenal abdominal aortic aneurysm (AAA) without rupture (HCC)  Assessment & Plan  As seen on CT  - Cont to monitor, Bp to be controlled adequately    Chronic right-sided low back pain without sciatica  Assessment & Plan  Patient has acute on chronic back pain  Now worsening that she is having difficulty ambulating  No risky symptoms of saddle anesthesia/bowel or bladder symptoms  -Pain control  - PT OT evaluation    Hypokalemia  Assessment & Plan  Probably from low oral intake  Magnesium ok  -Replaced with 40, repeat labs in a.m.    Elevated procalcitonin  Assessment & Plan  Patient does not appear to have any ongoing infection however given such elevated procalcitonin could have underlying POST- Obstructive pneumonia?   UA still pending, no reported fevers at home  Could be from high state inflammation state-malignancy?    -Follow-up on blood cultures  -Empirically treat with doxycycline and Unasyn  -Check MRSA nares  -Check Pro-David in 48 hours    Mixed hyperlipidemia  Assessment & Plan  Patient takes Lipitor at home, resume    Primary  hypertension  Assessment & Plan  Holding home med lisinopril in view of ongoing JUDE    COPD with asthma (HCC)- (present on admission)  Assessment & Plan  Patient continues to smoke  Patient takes azithromycin at home  - As needed DuoNeb      Small cell lung cancer (HCC)- (present on admission)  Assessment & Plan  Recurrent stage IV small cell lung cancer   Patient was on palliative chemo however now has been off of chemo from past several months.  Patient does not want to pursue any further cancer treatments    Breast cancer - (present on admission)  Assessment & Plan  H/o in 2011  s/p partial mastectomy in 2011  As per pt she had refused chemotherapy then but was cancer free with repeat tests.  Last mammography in May 2024 which did not show any obvious malignancy          VTE prophylaxis: SCDs/TEDs

## 2025-03-26 NOTE — CONSULTS
DATE OF CONSULTATION:  3/25/2025     REFERRING PHYSICIAN:   Javier Bassett D.O.     CONSULTING PHYSICIAN:  Marlon Momin M.D.     REASON FOR CONSULTATION:  I have been asked by Dr. Bassett to see the patient in surgical consultation for evaluation of right hydropneumothorax.    HISTORY OF PRESENT ILLNESS: The patient is a 80 year-old White elderly woman with a history of metastatic lung cancer who presents to the Emergency Department with a one-week history of worsening  back  pain. The pain is not associated with nausea, vomiting, fever, diaphoresis, and perianal numbness or incontinence .  She denies dyspnea. She has a history of metastatic lung cancer but has not received chemotherapy in approximately 6 months. Imaging in the ER demonstrated a large right hydropneumothorax for which ACS was consulted for assistance in management. In the ER she was saturating well on room air and denied dyspnea. The patient denies any history of previous abdominal surgery. The patient denies any previous surgery for obstructive symptoms..     PAST MEDICAL HISTORY:  has a past medical history of Anemia (1962), Arthritis, Breast cancer (HCC), Cancer (HCC) (2011), Cancer (HCC) (2020), Cataract, COPD (chronic obstructive pulmonary disease) (HCC), Cough, Dental disorder, EMPHYSEMA, High cholesterol, Pain, Pain, Pneumonia (1948), Renal disorder (1997), Renal disorder, S/P radiation therapy, Shortness of breath (07/16/2024), Small cell lung cancer (HCC), Snoring, Sputum production, Urinary incontinence (07/16/2024), and Wheezing.    She has no past medical history of Painful breathing.    PAST SURGICAL HISTORY:  has a past surgical history that includes node biopsy sentinel (8/8/2011); tonsillectomy (1972); breast biopsy (8/23/2011); colonoscopy with biopsy (3/15/2012); pr radiation therapy plan simple; lumpectomy (8/8/2011); pr bronchoscopy,diagnostic (5/7/2020); endobronchial us add-on (5/7/2020); pr drain/inject large  joint/bursa (Right, 8/4/2022); and pr bronchoscopy,diagnostic (N/A, 7/2/2024).    ALLERGIES:   Allergies   Allergen Reactions    Flu Virus Vaccine Hives       CURRENT MEDICATIONS:    Home Medications       Reviewed by Timur Smith (Pharmacy Tech) on 03/25/25 at 1725  Med List Status: Complete     Medication Last Dose Status   atorvastatin (LIPITOR) 10 MG Tab 3/18/2025 Active   azithromycin (ZITHROMAX) 250 MG Tab Unknown Active   lisinopril (PRINIVIL) 10 MG Tab 3/18/2025 Active                  Audit from Redirected Encounters    **Home medications have not yet been reviewed for this encounter**         FAMILY HISTORY: family history includes Cancer in her mother, sister, sister, and sister; Heart Disease in her father and another family member; Stroke in an other family member.    SOCIAL HISTORY:  reports that she has been smoking cigarettes. She started smoking about 63 years ago. She has a 125.5 pack-year smoking history. She has never used smokeless tobacco. She reports current alcohol use. She reports that she does not use drugs.    REVIEW OF SYSTEMS: Comprehensive review of systems is negative with the exception of the aforementioned HPI, PMH, and PSH bullets in accordance with CMS guidelines.    PHYSICAL EXAMINATION:    Physical Exam  Vitals and nursing note reviewed.   Constitutional:       General: She is not in acute distress.     Appearance: She is not toxic-appearing.   HENT:      Head: Normocephalic and atraumatic.      Right Ear: External ear normal.      Left Ear: External ear normal.      Nose: Nose normal.      Mouth/Throat:      Mouth: Mucous membranes are moist.      Pharynx: Oropharynx is clear.   Eyes:      General: No scleral icterus.     Pupils: Pupils are equal, round, and reactive to light.   Cardiovascular:      Rate and Rhythm: Normal rate.   Pulmonary:      Effort: Pulmonary effort is normal. No respiratory distress.      Breath sounds: Examination of the right-middle field reveals  decreased breath sounds. Examination of the right-lower field reveals decreased breath sounds. Decreased breath sounds present.   Chest:      Comments: Right-sided port in place.  Abdominal:      General: There is no distension.      Palpations: Abdomen is soft.      Tenderness: There is no abdominal tenderness. There is no guarding or rebound.   Genitourinary:     Comments: Pelvis stable.  Musculoskeletal:         General: No tenderness.      Cervical back: Normal range of motion and neck supple.   Skin:     General: Skin is warm and dry.      Capillary Refill: Capillary refill takes less than 2 seconds.      Coloration: Skin is not jaundiced.   Neurological:      General: No focal deficit present.      Mental Status: She is alert and oriented to person, place, and time.   Psychiatric:         Behavior: Behavior is cooperative.       LABORATORY VALUES:   Recent Labs     03/25/25  1513   WBC 10.2   RBC 4.84   HEMOGLOBIN 15.3   HEMATOCRIT 43.9   MCV 90.7   MCH 31.6   MCHC 34.9   RDW 50.9*   PLATELETCT 285   MPV 10.5     Recent Labs     03/25/25  1513   SODIUM 137   POTASSIUM 3.2*   CHLORIDE 100   CO2 15*   GLUCOSE 182*   *   CREATININE 2.01*   CALCIUM 6.8*     Recent Labs     03/25/25  1513   ASTSGOT 46*   ALTSGPT 12   TBILIRUBIN <0.2   ALKPHOSPHAT 84   GLOBULIN 3.3            IMAGING:   CT-LSPINE W/O PLUS RECONS   Final Result   Impression:      1. Osteopenia and advanced degenerative disc disease at L4-L5.   2. No definite fracture or malalignment.      CT-TSPINE W/O PLUS RECONS   Final Result      Multilevel degenerative disease without acute fracture or malalignment.      CT-CHEST,ABDOMEN,PELVIS W/O   Final Result      1.  Large right hydropneumothorax.      2.  Multifocal nodular masses in the right lower lobe. Additionally there are pleural-based masses posteriorly in the base of the right hemithorax. These findings are suspicious for metastatic disease.      3.  3.4 cm anterior mediastinal mass  suspicious for metastatic disease.      4.  Nonobstructing left upper pole nephrolithiasis.      5.  4.2 cm infrarenal aortic aneurysm.      6.  These findings were discussed with DUARTE FRY at 6:00 PM 3/25/2025          ASSESSMENT AND PLAN:     80 year-old woman with metastatic lung cancer presenting with worsening back pain found to have a large right hydropneumothorax. Her multiple pulmonary nodules in the same hemithorax as her effusion are highly concerning that she has a malignant effusion. Given that she does not have any signs or symptoms of respiratory distress and is saturating well on room air there is no indication for urgent tube thoracostomy placement at this time. A thoracostomy tube may drain a malignant effusion indefinitely, since this is non-urgent recommend discussion of Pleur-X catheter with IR. However this decision should also involve however is globally managing her cancer care.  ASC available to urgent thoracostomy tube placement should she decompensate.      DISPOSITION: Medical evaluation and admission. The patient was admitted to the Medical Service prior to surgical consultation. [facility] [place] [facility] will follow.     ____________________________________     Marlon Momin M.D.    DD: 3/25/2025  6:27 PM    AAST Grading System for EGS Conditions  ACS NSQIP Surgical Risk Calculator

## 2025-03-26 NOTE — ASSESSMENT & PLAN NOTE
Acute on chronic  Pain control  Comfort care while awaiting Pleurx placement and discharging home on hospice

## 2025-03-26 NOTE — PROGRESS NOTES
Hospital Medicine Daily Progress Note    Date of Service  3/26/2025    Chief Complaint  Emily Barnes is a 80 y.o. female admitted 3/25/2025 with back pain    Hospital Course:    80-year-old female with history of breast cancer, small cell lung cancer, smoking, and AAA who presented 3/25 with shortness of breath.  Patient has recurrent stage IV small cell lung cancer and she was on palliative chemotherapy patient noticed right back pain with no fever or chills and no significant chest pain or shortness of breath, patient was on room air, patient was found to have JUDE with creatinine 1.5 and calcium 6.7, IV fluids with IV calcium were started, CT scan of her L-spine and chest with abdomen showed large right hydropneumothorax, general surgeon was consulted and recommended IR.  Thoracentesis was ordered before considering politics.    Also CT scan showed multifocal nodules masses in the right lower lobe findings suspicious of metastatic disease also patient has mediastinal mass around 3.4 cm.    Interval Problem Update  -Evaluated examined the patient at bedside, still having some pain with no shortness of breath and no chest pain.  -Discussed with IR and general surgeon, planning for thoracentesis first  -Chest x-ray daily and consider stat general surgery consult if there is any worsening on respiratory status, patient is on room air at this time.  -Case was discussed with the patient and her niece, answered all her questions, patient wants to be DNR/DNI, agreed for any procedures however she is not ready for hospice.  -COVID on her kidney function, decrease IV fluid.  -Start with inhalers for possible COPD      I have discussed this patient's plan of care and discharge plan at IDT rounds today with Case Management, Nursing, Nursing leadership, and other members of the IDT team.    Consultants/Specialty  General surgeon    Code Status  DNAR/DNI    Disposition  The patient is not medically cleared for discharge to  home or a post-acute facility.  Anticipate discharge to: home with organized home healthcare and close outpatient follow-up    I have placed the appropriate orders for post-discharge needs.    Review of Systems  Review of Systems   Constitutional:  Positive for malaise/fatigue and weight loss. Negative for chills and fever.   HENT:  Negative for ear pain, hearing loss and tinnitus.    Eyes:  Negative for blurred vision, double vision and photophobia.   Respiratory:  Negative for cough and hemoptysis.    Cardiovascular:  Negative for chest pain, palpitations, orthopnea and claudication.   Gastrointestinal:  Negative for abdominal pain, constipation, diarrhea, nausea and vomiting.   Genitourinary:  Negative for dysuria, frequency and urgency.   Musculoskeletal:  Positive for back pain. Negative for myalgias and neck pain.   Skin:  Negative for rash.   Neurological:  Negative for dizziness, speech change and weakness.        Physical Exam  Temp:  [36 °C (96.8 °F)-36.7 °C (98.1 °F)] 36.7 °C (98.1 °F)  Pulse:  [75-97] 86  Resp:  [15-21] 18  BP: ()/(51-68) 104/68  SpO2:  [91 %-96 %] 92 %    Physical Exam  Constitutional:       General: She is not in acute distress.     Appearance: She is ill-appearing.   HENT:      Head: Normocephalic and atraumatic.   Cardiovascular:      Rate and Rhythm: Normal rate.      Heart sounds: No murmur heard.  Pulmonary:      Effort: No respiratory distress.      Breath sounds: No wheezing or rales.   Abdominal:      General: There is no distension.      Tenderness: There is no abdominal tenderness.   Musculoskeletal:      Right lower leg: No edema.      Left lower leg: No edema.   Skin:     General: Skin is dry.      Coloration: Skin is pale.      Findings: No bruising or lesion.   Neurological:      General: No focal deficit present.      Mental Status: She is oriented to person, place, and time. Mental status is at baseline.      Cranial Nerves: No cranial nerve deficit.      Motor:  No weakness.         Fluids    Intake/Output Summary (Last 24 hours) at 3/26/2025 1530  Last data filed at 3/26/2025 0401  Gross per 24 hour   Intake 312.73 ml   Output 300 ml   Net 12.73 ml        Laboratory  Recent Labs     03/25/25  1513 03/26/25  0319   WBC 10.2 8.1   RBC 4.84 4.34   HEMOGLOBIN 15.3 13.2   HEMATOCRIT 43.9 39.3   MCV 90.7 90.6   MCH 31.6 30.4   MCHC 34.9 33.6   RDW 50.9* 50.6*   PLATELETCT 285 244   MPV 10.5 10.5     Recent Labs     03/25/25  1513 03/25/25  2314 03/26/25  0319   SODIUM 137 136 141   POTASSIUM 3.2* 3.4* 3.2*   CHLORIDE 100 104 110   CO2 15* 11* 14*   GLUCOSE 182* 115* 128*   * 97* 81*   CREATININE 2.01* 1.56* 1.05   CALCIUM 6.8* 6.7* 6.8*     Recent Labs     03/26/25  1432   INR 1.54*               Imaging  DX-CHEST-LIMITED (1 VIEW)   Final Result      1.  Small right hydropneumothorax.   2.  Right lung interstitial and airspace opacities, greater inferiorly.      Findings conveyed to Dr. Sosa at 3/26/2025 9:19 AM via Voalte messaging.      CT-LSPINE W/O PLUS RECONS   Final Result   Impression:      1. Osteopenia and advanced degenerative disc disease at L4-L5.   2. No definite fracture or malalignment.      CT-TSPINE W/O PLUS RECONS   Final Result      Multilevel degenerative disease without acute fracture or malalignment.      CT-CHEST,ABDOMEN,PELVIS W/O   Final Result      1.  Large right hydropneumothorax.      2.  Multifocal nodular masses in the right lower lobe. Additionally there are pleural-based masses posteriorly in the base of the right hemithorax. These findings are suspicious for metastatic disease.      3.  3.4 cm anterior mediastinal mass suspicious for metastatic disease.      4.  Nonobstructing left upper pole nephrolithiasis.      5.  4.2 cm infrarenal aortic aneurysm.      6.  These findings were discussed with DUARTE FRY at 6:00 PM 3/25/2025      US-THORACENTESIS PUNCTURE RIGHT    (Results Pending)        Assessment/Plan  * JUDE (acute  kidney injury) (HCC)- (present on admission)  Assessment & Plan  Most likely prerenal.  Improving  Labs daily  Avoid nephrotoxic medications    Small cell lung cancer (HCC)- (present on admission)  Assessment & Plan  Recurrent stage IV small cell lung cancer   Patient was on palliative chemo however now has been off of chemo from past several months.  Patient wants to be DNR/DNI however refused hospice  Follow-up with her oncologist        Infrarenal abdominal aortic aneurysm (AAA) without rupture (HCC)  Assessment & Plan  As seen on CT  Follow-up as outpatient  \Encourage the patient to quit smoking    Chronic right-sided low back pain without sciatica  Assessment & Plan  Acute on chronic  No risky symptoms of saddle anesthesia/bowel or bladder symptoms  Pain control and PT with OT  Consider MRI to rule out metastasis and there is no improving    Hypokalemia  Assessment & Plan  Likely related to low oral intake  Labs daily and replace orally    Mixed hyperlipidemia  Assessment & Plan  Resume home medication Lipitor    Primary hypertension  Assessment & Plan  Holding home med lisinopril in view of ongoing JUDE    Hydropneumothorax  Assessment & Plan  Large right sided, likely related to malignancy  Discussed with general surgery and IR, planning for thoracentesis at this time  Chest x-ray daily  Patient might need Pleurx and chest catheter  Patient is on room air, close monitoring and consider surgery consult stat for any worsening on respiratory status for chest tube placement..    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  Niece states she is moving to [place] to be next to her.  Time 30-minute.    Breast cancer - (present on admission)  Assessment & Plan  H/o in 2011  s/p partial mastectomy in 2011  As per pt she had refused chemotherapy then but was cancer free with repeat tests.  Last mammography in May 2024 which did not show any obvious malignancy  Follow-up with her oncologist as outpatient           VTE prophylaxis:   SCDs/TEDs   heparin ppx      I have performed a physical exam and reviewed and updated ROS and Plan today (3/26/2025). In review of yesterday's note (3/25/2025), there are no changes except as documented above.      Greater than 51 minutes spent prepping to see patient (e.g. review of tests) obtaining and/or reviewing separately obtained history. Performing a medically appropriate examination and/ evaluation.  Counseling and educating the patient/family/caregiver.  Ordering medications, tests, or procedures.  Referring and communicating with other health care professionals.  Documenting clinical information in EPIC.  Independently interpreting results and communicating results to patient/family/caregiver.  Care coordination

## 2025-03-26 NOTE — ASSESSMENT & PLAN NOTE
Patient does not appear to have any ongoing infection however given such elevated procalcitonin could have underlying POST- Obstructive pneumonia?   UA still pending, no reported fevers at home  Could be from high state inflammation state-malignancy?    -Follow-up on blood cultures  -Empirically treat with doxycycline and Unasyn  -Check MRSA nares  -Check Pro-David in 48 hours

## 2025-03-26 NOTE — ASSESSMENT & PLAN NOTE
Significant history of smoking   Dulera and DuoNeb available as patient requesting inhaler  Comfort care while awaiting Pleurx placement and discharging home on hospice

## 2025-03-26 NOTE — PROGRESS NOTES
Bedside report received from day shift RN  KHALIF Nicholson. Pt is currently A&Ox4, SR, breathing at a normal rate and rhythm, warm, dry, and skin color appropriate for ethnicity, and in no visible emotional or physical distress. Bed locked in lowest position, call light is within reach, fall prevention measures in place. Pt educated to use call light before getting out of bed, pt verbalized understanding. Pt is on a cardiac monitor, order verified, transmitter connected appropriately, and leads placed correctly, rhythm and rate assessed by RN, monitor staff updated of changes and parameters as necessary.  No further needs at this time. Assumed care of pt. Report given to assisting staff - CNA/CCT/ACT    Fall Risk Score: HIGH RISK  Fall risk interventions in place: Place yellow fall risk ID band on patient, Provide patient/family education based on risk assessment, Educate patient/family to call staff for assistance when getting out of bed, Place fall precaution signage outside patient door, Place patient in room close to nursing station, Utilize bed/chair fall alarm, Notify charge of high risk for huddle, and Bed alarm connected correctly  Bed type: Regular (Romario Score less than 17 interventions in place)  Patient on cardiac monitor: Yes  IVF/IV medications: Not Applicable   Oxygen: Room Air  Bedside sitter: Not Applicable   Isolation: Not applicable

## 2025-03-26 NOTE — ED NOTES
Bedside report received from off going RN/tech: Xiomy STEWART RN. Bharat Montaño RN assumed care of patient.  POC discussed with patient. Call light within reach, all needs addressed at this time.       Fall risk interventions in place: Accompanied to restroom (all applicable per [employer] Fall risk assessment)   Continuous monitoring: Cardiac Leads, Pulse Ox, or Blood Pressure  IVF/IV medications: NS 0.9%  Oxygen: Room Air  Bedside sitter: Not Applicable   Isolation: Not Applicable        30-Jul-2018

## 2025-03-26 NOTE — ASSESSMENT & PLAN NOTE
H/o in 2011  s/p partial mastectomy in 2011  As per pt she had refused chemotherapy then but was cancer free with repeat tests.  Last mammography in May 2024 which did not show any obvious malignancy  Comfort care  Patient to be discharged on hospice following the placement of Pleurx

## 2025-03-26 NOTE — DISCHARGE PLANNING
Post Acute Navigator Team    Patient screened based off of following information:    EOL Index medium risk score 94  High LACE + score 72  Low 6 click Mobility score 21   Low 6 click ADL score 22   Readmission: no       Per chart review,  Patient is pending drainage w/surgery possibly today. Therapy evals on hold, post acute needs to be determined.       Please reach out to me directly should care team feel patient will benefit from a discharge goals of care conversation regarding outpatient palliative care or hospice.

## 2025-03-26 NOTE — ASSESSMENT & PLAN NOTE
S/p thoracentesis, on room air  Comfort care while awaiting Pleurx placement and discharging home on hospice

## 2025-03-27 PROBLEM — G89.3 CANCER ASSOCIATED PAIN: Status: ACTIVE | Noted: 2025-03-27

## 2025-03-27 PROBLEM — E83.42 HYPOMAGNESEMIA: Status: ACTIVE | Noted: 2025-03-27

## 2025-03-27 PROBLEM — J91.0 MALIGNANT PLEURAL EFFUSION: Status: ACTIVE | Noted: 2025-03-27

## 2025-03-27 LAB
ALBUMIN SERPL BCP-MCNC: 3.2 G/DL (ref 3.2–4.9)
AMYLASE FLD-CCNC: 56 U/L
APPEARANCE FLD: NORMAL
BODY FLD TYPE: NORMAL
BUN SERPL-MCNC: 41 MG/DL (ref 8–22)
CALCIUM ALBUM COR SERPL-MCNC: 9.5 MG/DL (ref 8.5–10.5)
CALCIUM SERPL-MCNC: 8.9 MG/DL (ref 8.5–10.5)
CELLS FLD: 1
CHLORIDE SERPL-SCNC: 117 MMOL/L (ref 96–112)
CO2 SERPL-SCNC: 15 MMOL/L (ref 20–33)
COLOR FLD: NORMAL
CREAT SERPL-MCNC: 0.59 MG/DL (ref 0.5–1.4)
CRP SERPL HS-MCNC: 1.45 MG/DL (ref 0–0.75)
CYTOLOGY REG CYTOL: NORMAL
EOSINOPHIL NFR FLD: 2 %
GFR SERPLBLD CREATININE-BSD FMLA CKD-EPI: 92 ML/MIN/1.73 M 2
GLUCOSE FLD-MCNC: 88 MG/DL
GLUCOSE SERPL-MCNC: 87 MG/DL (ref 65–99)
GRAM STN SPEC: NORMAL
LDH FLD L TO P-CCNC: 383 U/L
LYMPHOCYTES NFR FLD: 46 %
MAGNESIUM SERPL-MCNC: 1.4 MG/DL (ref 1.5–2.5)
MONOS+MACROS NFR FLD MANUAL: 5 %
NEUTROPHILS NFR FLD: 29 %
NUC CELL # FLD: 3612 CELLS/UL
PH FLD: 7 [PH]
PHOSPHATE SERPL-MCNC: 1.6 MG/DL (ref 2.5–4.5)
POTASSIUM SERPL-SCNC: 3.5 MMOL/L (ref 3.6–5.5)
PROCALCITONIN SERPL-MCNC: 0.93 NG/ML
PROT FLD-MCNC: 2.9 G/DL
RBC # FLD: NORMAL CELLS/UL
SIGNIFICANT IND 70042: NORMAL
SITE SITE: NORMAL
SODIUM SERPL-SCNC: 144 MMOL/L (ref 135–145)
SOURCE SOURCE: NORMAL
WBC OTHER NFR FLD: 17 %

## 2025-03-27 PROCEDURE — 87070 CULTURE OTHR SPECIMN AEROBIC: CPT

## 2025-03-27 PROCEDURE — 700111 HCHG RX REV CODE 636 W/ 250 OVERRIDE (IP): Mod: TB | Performed by: STUDENT IN AN ORGANIZED HEALTH CARE EDUCATION/TRAINING PROGRAM

## 2025-03-27 PROCEDURE — 83615 LACTATE (LD) (LDH) ENZYME: CPT

## 2025-03-27 PROCEDURE — 700105 HCHG RX REV CODE 258: Performed by: INTERNAL MEDICINE

## 2025-03-27 PROCEDURE — 88112 CYTOPATH CELL ENHANCE TECH: CPT | Performed by: PATHOLOGY

## 2025-03-27 PROCEDURE — 700101 HCHG RX REV CODE 250: Performed by: INTERNAL MEDICINE

## 2025-03-27 PROCEDURE — 80069 RENAL FUNCTION PANEL: CPT

## 2025-03-27 PROCEDURE — 84145 PROCALCITONIN (PCT): CPT

## 2025-03-27 PROCEDURE — 82150 ASSAY OF AMYLASE: CPT

## 2025-03-27 PROCEDURE — 88112 CYTOPATH CELL ENHANCE TECH: CPT | Mod: 26 | Performed by: PATHOLOGY

## 2025-03-27 PROCEDURE — 99233 SBSQ HOSP IP/OBS HIGH 50: CPT | Mod: 25 | Performed by: INTERNAL MEDICINE

## 2025-03-27 PROCEDURE — 87116 MYCOBACTERIA CULTURE: CPT

## 2025-03-27 PROCEDURE — 99498 ADVNCD CARE PLAN ADDL 30 MIN: CPT | Performed by: NURSE PRACTITIONER

## 2025-03-27 PROCEDURE — 0W993ZZ DRAINAGE OF RIGHT PLEURAL CAVITY, PERCUTANEOUS APPROACH: ICD-10-PCS

## 2025-03-27 PROCEDURE — 700102 HCHG RX REV CODE 250 W/ 637 OVERRIDE(OP): Performed by: INTERNAL MEDICINE

## 2025-03-27 PROCEDURE — 770001 HCHG ROOM/CARE - MED/SURG/GYN PRIV*

## 2025-03-27 PROCEDURE — 88305 TISSUE EXAM BY PATHOLOGIST: CPT | Mod: 26 | Performed by: PATHOLOGY

## 2025-03-27 PROCEDURE — 87015 SPECIMEN INFECT AGNT CONCNTJ: CPT

## 2025-03-27 PROCEDURE — 86140 C-REACTIVE PROTEIN: CPT

## 2025-03-27 PROCEDURE — 99255 IP/OBS CONSLTJ NEW/EST HI 80: CPT | Mod: 25 | Performed by: NURSE PRACTITIONER

## 2025-03-27 PROCEDURE — 83735 ASSAY OF MAGNESIUM: CPT

## 2025-03-27 PROCEDURE — 84157 ASSAY OF PROTEIN OTHER: CPT

## 2025-03-27 PROCEDURE — 700102 HCHG RX REV CODE 250 W/ 637 OVERRIDE(OP): Performed by: NURSE PRACTITIONER

## 2025-03-27 PROCEDURE — 87205 SMEAR GRAM STAIN: CPT | Mod: 91

## 2025-03-27 PROCEDURE — 88305 TISSUE EXAM BY PATHOLOGIST: CPT | Performed by: PATHOLOGY

## 2025-03-27 PROCEDURE — 83986 ASSAY PH BODY FLUID NOS: CPT

## 2025-03-27 PROCEDURE — 99497 ADVNCD CARE PLAN 30 MIN: CPT | Performed by: NURSE PRACTITIONER

## 2025-03-27 PROCEDURE — 89051 BODY FLUID CELL COUNT: CPT

## 2025-03-27 PROCEDURE — A9270 NON-COVERED ITEM OR SERVICE: HCPCS | Performed by: INTERNAL MEDICINE

## 2025-03-27 PROCEDURE — 700111 HCHG RX REV CODE 636 W/ 250 OVERRIDE (IP): Performed by: INTERNAL MEDICINE

## 2025-03-27 PROCEDURE — 87102 FUNGUS ISOLATION CULTURE: CPT

## 2025-03-27 PROCEDURE — 80503 PATH CLIN CONSLTJ SF 5-20: CPT

## 2025-03-27 PROCEDURE — 99497 ADVNCD CARE PLAN 30 MIN: CPT | Mod: 25 | Performed by: INTERNAL MEDICINE

## 2025-03-27 PROCEDURE — 99406 BEHAV CHNG SMOKING 3-10 MIN: CPT | Performed by: INTERNAL MEDICINE

## 2025-03-27 PROCEDURE — 82945 GLUCOSE OTHER FLUID: CPT

## 2025-03-27 RX ORDER — OXYCODONE HYDROCHLORIDE 10 MG/1
10 TABLET ORAL EVERY 4 HOURS PRN
Refills: 0 | Status: DISCONTINUED | OUTPATIENT
Start: 2025-03-27 | End: 2025-03-28

## 2025-03-27 RX ORDER — MAGNESIUM SULFATE HEPTAHYDRATE 40 MG/ML
4 INJECTION, SOLUTION INTRAVENOUS ONCE
Status: COMPLETED | OUTPATIENT
Start: 2025-03-27 | End: 2025-03-27

## 2025-03-27 RX ORDER — MORPHINE SULFATE 15 MG/1
15 TABLET, FILM COATED, EXTENDED RELEASE ORAL EVERY 12 HOURS
Refills: 0 | Status: DISCONTINUED | OUTPATIENT
Start: 2025-03-27 | End: 2025-03-27

## 2025-03-27 RX ORDER — HEPARIN SODIUM 5000 [USP'U]/ML
5000 INJECTION, SOLUTION INTRAVENOUS; SUBCUTANEOUS EVERY 8 HOURS
Status: COMPLETED | OUTPATIENT
Start: 2025-03-27 | End: 2025-03-27

## 2025-03-27 RX ORDER — MORPHINE SULFATE 15 MG/1
15 TABLET, FILM COATED, EXTENDED RELEASE ORAL EVERY 12 HOURS
Refills: 0 | Status: DISCONTINUED | OUTPATIENT
Start: 2025-03-27 | End: 2025-04-01 | Stop reason: HOSPADM

## 2025-03-27 RX ORDER — LANOLIN ALCOHOL/MO/W.PET/CERES
400 CREAM (GRAM) TOPICAL DAILY
Status: DISCONTINUED | OUTPATIENT
Start: 2025-03-27 | End: 2025-03-29

## 2025-03-27 RX ORDER — DEXAMETHASONE 4 MG/1
4 TABLET ORAL 2 TIMES DAILY
Status: DISCONTINUED | OUTPATIENT
Start: 2025-03-27 | End: 2025-03-28

## 2025-03-27 RX ADMIN — SODIUM BICARBONATE 650 MG: 650 TABLET ORAL at 08:21

## 2025-03-27 RX ADMIN — SODIUM BICARBONATE 650 MG: 650 TABLET ORAL at 14:38

## 2025-03-27 RX ADMIN — MAGNESIUM SULFATE IN WATER FOR 4 G: 40 INJECTION INTRAVENOUS at 08:25

## 2025-03-27 RX ADMIN — SODIUM CHLORIDE: 9 INJECTION, SOLUTION INTRAVENOUS at 05:16

## 2025-03-27 RX ADMIN — HYDROMORPHONE HYDROCHLORIDE 0.5 MG: 1 INJECTION, SOLUTION INTRAMUSCULAR; INTRAVENOUS; SUBCUTANEOUS at 00:19

## 2025-03-27 RX ADMIN — DEXAMETHASONE 4 MG: 4 TABLET ORAL at 17:37

## 2025-03-27 RX ADMIN — HYDROMORPHONE HYDROCHLORIDE 0.5 MG: 1 INJECTION, SOLUTION INTRAMUSCULAR; INTRAVENOUS; SUBCUTANEOUS at 20:51

## 2025-03-27 RX ADMIN — OXYCODONE 5 MG: 5 TABLET ORAL at 08:21

## 2025-03-27 RX ADMIN — TIOTROPIUM BROMIDE INHALATION SPRAY 5 MCG: 3.12 SPRAY, METERED RESPIRATORY (INHALATION) at 06:00

## 2025-03-27 RX ADMIN — SODIUM BICARBONATE 650 MG: 650 TABLET ORAL at 20:53

## 2025-03-27 RX ADMIN — MORPHINE SULFATE 15 MG: 15 TABLET, FILM COATED, EXTENDED RELEASE ORAL at 16:10

## 2025-03-27 RX ADMIN — HYDROMORPHONE HYDROCHLORIDE 0.5 MG: 1 INJECTION, SOLUTION INTRAMUSCULAR; INTRAVENOUS; SUBCUTANEOUS at 12:46

## 2025-03-27 RX ADMIN — POTASSIUM PHOSPHATE, MONOBASIC AND POTASSIUM PHOSPHATE, DIBASIC 30 MMOL: 224; 236 INJECTION, SOLUTION, CONCENTRATE INTRAVENOUS at 09:22

## 2025-03-27 RX ADMIN — MOMETASONE FUROATE AND FORMOTEROL FUMARATE DIHYDRATE 2 PUFF: 100; 5 AEROSOL RESPIRATORY (INHALATION) at 16:48

## 2025-03-27 RX ADMIN — HEPARIN SODIUM 5000 UNITS: 5000 INJECTION, SOLUTION INTRAVENOUS; SUBCUTANEOUS at 22:19

## 2025-03-27 RX ADMIN — MOMETASONE FUROATE AND FORMOTEROL FUMARATE DIHYDRATE 2 PUFF: 100; 5 AEROSOL RESPIRATORY (INHALATION) at 05:11

## 2025-03-27 RX ADMIN — HYDROMORPHONE HYDROCHLORIDE 0.5 MG: 1 INJECTION, SOLUTION INTRAMUSCULAR; INTRAVENOUS; SUBCUTANEOUS at 05:14

## 2025-03-27 RX ADMIN — OXYCODONE 5 MG: 5 TABLET ORAL at 14:38

## 2025-03-27 RX ADMIN — Medication 400 MG: at 17:37

## 2025-03-27 RX ADMIN — HYDROMORPHONE HYDROCHLORIDE 0.5 MG: 1 INJECTION, SOLUTION INTRAMUSCULAR; INTRAVENOUS; SUBCUTANEOUS at 09:26

## 2025-03-27 ASSESSMENT — ENCOUNTER SYMPTOMS
WEAKNESS: 0
BLURRED VISION: 0
DOUBLE VISION: 0
NAUSEA: 0
INSOMNIA: 1
HEMOPTYSIS: 0
NECK PAIN: 0
PHOTOPHOBIA: 0
SHORTNESS OF BREATH: 1
BACK PAIN: 1
SPEECH CHANGE: 0
VOMITING: 0
CHILLS: 0
CONSTIPATION: 0
FEVER: 0
DEPRESSION: 0
CLAUDICATION: 0
PALPITATIONS: 0
NERVOUS/ANXIOUS: 0
WEIGHT LOSS: 1
ORTHOPNEA: 0
COUGH: 0
MYALGIAS: 0
DIARRHEA: 0
DIZZINESS: 0
ABDOMINAL PAIN: 0

## 2025-03-27 ASSESSMENT — PAIN DESCRIPTION - PAIN TYPE
TYPE: CHRONIC PAIN
TYPE: ACUTE PAIN
TYPE: CHRONIC PAIN
TYPE: ACUTE PAIN

## 2025-03-27 ASSESSMENT — LIFESTYLE VARIABLES: SUBSTANCE_ABUSE: 0

## 2025-03-27 ASSESSMENT — FIBROSIS 4 INDEX: FIB4 SCORE: 4.3

## 2025-03-27 NOTE — THERAPY
Speech Language Therapy Contact Note    Patient Name: Emily Barnes  Age:  80 y.o., Sex:  female  Medical Record #: [id number]  Today's Date: 3/27/2025    Pt remains NPO for a possible procedure. ST to continue to hold pending CSE

## 2025-03-27 NOTE — CARE PLAN
The patient is Stable - Low risk of patient condition declining or worsening    Shift Goals  Clinical Goals: safety  Patient Goals: pain control  Family Goals: emeterio    Progress made toward(s) clinical / shift goals:      Problem: Pain - Standard  Goal: Alleviation of pain or a reduction in pain to the patient’s comfort goal  Outcome: Progressing     Problem: Fall Risk  Goal: Patient will remain free from falls  Outcome: Progressing       Patient is not progressing towards the following goals:

## 2025-03-27 NOTE — CONSULTS
"MRN: [id number]  Date of palliative consult: 03/27/25  Reason for consult: GOC/ACP  Referring provider: Dr. Sosa  Location of consult: T830-00  Additional consulting services: General surgery 3/25    HPI:   Emily Barnes is a 80 y.o. female with medical history significant for stage IV small cell lung cancer off chemo for several months, history of breast cancer in 2011 status post partial mastectomy with refusal chemotherapy and reached remission with surgery alone, brought in by EMS 3/25/2025 for abdominal and acute on chronic abdominal and low back pain.  Hypotensive and route.  Imaging notable for large right hydropneumothorax.  Surgery consulted and given that patient did not have signs or symptoms of respiratory distress saturating well on room air there was no indication for urgent thoracostomy tube placement and recommended discussion of pleural catheter drain with IR.  Hospital medicine discussed with IR and general surgery with plan for thoracentesis first prior to drain placement.    Additional Pertinent Medical History: COPD, ongoing tobacco use, AAA without rupture, arthritis, HTN, HLD    Patient is having severe excruciating back pain since last chemotherapy which has been longstanding.  Pain is primarily in her right lower back and is constant \"it feels like somebody is sticking a knife in me and twisting it.\"  She is taking ibuprofen every 3 hours at home with little relief.  She reports she does not remember the last time she had a good night sleep due to the pain and only sleeps in about 15-minute increments.  She joked that she would like \"eternal rest\" given for pain management and its effect on her life.    ROS:    Review of Systems   Constitutional:  Positive for malaise/fatigue.   Respiratory:  Positive for shortness of breath.    Musculoskeletal:  Positive for back pain.   Psychiatric/Behavioral:  Negative for depression and substance abuse. The patient has insomnia (related to pain). " The patient is not nervous/anxious.      PE:   Recent vital signs  BMI: Body mass index is 24.33 kg/m².    Temp (24hrs), Av.6 °C (97.8 °F), Min:36.3 °C (97.3 °F), Max:36.7 °C (98.1 °F)  Temperature: 36.3 °C (97.3 °F), Monitored Temp: 36.3 °C (97.3 °F)  Pulse  Av.8  Min: 75  Max: 97   Blood Pressure : 104/70       Physical Exam  Constitutional:       General: She is not in acute distress.  Abdominal:      Palpations: Abdomen is soft.   Musculoskeletal:      Right lower leg: No edema.      Left lower leg: No edema.   Skin:     Comments: Dressing of prior thoracentesis clean dry and intact   Neurological:      Mental Status: She is oriented to person, place, and time.      Comments: Oriented to event   Psychiatric:         Attention and Perception: Attention normal.         Mood and Affect: Mood and affect normal.         Speech: Speech normal.         Behavior: Behavior normal.         Thought Content: Thought content normal.         Cognition and Memory: Cognition normal.         Judgment: Judgment normal.       ASSESSMENT/PLAN WITH SHARED DECISION MAKING:   PHYSICAL ASPECTS OF CARE  Palliative Performance Scale: 40-50%    # Stage IV small cell lung cancer  # Hydropneumothorax  Status post thoracentesis  Evaluation by IR ordered for Pleurx catheter  # JUDE  # Chronic right-sided low back pain without sciatica  Started on MS ER 15 mg p.o. every 12 hours per primary team  Added short course of dexamethasone 4 mg p.o. twice daily  Provided education on opioid induced constipation prevention  Patient is currently on a bowel regiment  # Abdominal pain  #COPD  # History of AAA  #History of breast cancer in remission    SOCIAL ASPECTS OF CARE  .  Patient's niece/goddaughter who lives in [facility] Andrez Restrepo who has traveled to [place] to take care of patient through the end of her life.  Patient has 3 adopted children however she is not in contact with them.  To live in [place] and 1 lives in [place].  Patient grew  up in a household of 8 children and 3 adults.  She continues to smoke cigarettes stating it was very difficult for her to quit after 6 to 8 years and she understood the risk.    SPIRITUAL ASPECTS OF CARE   Patient is [employer].  She declined spiritual care visit from .  Discussed this would be available to her at home on hospice care.  Updated patient's niece Andrez as she feels patient will want anointing of the sick.  Circled back with patient who confirms she does not currently want.    GOALS OF CARE/SERIOUS ILLNESS CONVERSATION  Prior goals of care discussion reviewed per Dr. Sosa patient expressed wishes for DNR/DNI, agreeable for any procedures, not ready for hospice.    Introduced myself to patient. Discussed role of palliative care and reason for consult.  She is agreeable to discuss goals of care.  We had a long discussion and she stated that she is ready for the end of her life and just wants to be at home.  She joked about wanting internal rest due to being in pain and not being able to sleep.  She does not want her cancer treated.  She confirmed earlier she declined hospice as she did not want to go live at a hospice facility.  She confirmed that her niece Andrez told her hospice would come to the home.    I confirmed hospice can be done at home in the community and provided extensive education on hospice care and hospice philosophy.  She was agreeable to hospice referral.  We discussed symptom management.  I offered to call her niece.  Call placed to patient's niece and provided overview of above.  Andrez is agreeable with aggressive symptom management and hospice referral.  Andrez is hoping patient can have a drain placed to control her symptoms better at home after Andrez's experience caring for her own mother. I provided extensive education on hospice care in our community and answered all of Andrez's questions.  Discussed hospice referral process.  Andrez  "believes that patient obtained a plot in [place] but asked about mortuary list.  Andrez confirms she is planning to take care of patient through the end of her life.    Patient agreeable to hospice referral.  Recommended completion of new POLST form.  Patient confirms she has a DNR POLST at home.  We updated POLST form for DNR, comfort focused treatment, no artificial nutrition, no feeding IV fluids. Provided palliative care contact information to patient and her niece Andrez.  Updated Dr. Sosa.     Code Status: DNR/DNI    ACP Documents: None    60 minutes spent discussing advance care planning, this time excludes any other billed services.    Interval diagnostic studies and medical documentation entries pertinent to this case were reviewed independently by me. This patient has at least one acute or chronic illness or injury that poses a threat to life or bodily function. This patient suffers from a high risk of morbidity from additional invasive diagnostic testing or intensive treatment. Discussion of recommendations and coordination of care undertaken with primary provider/treatment team.      Santa \"Caron\" AMNA Carrera, [facility]  Inpatient Palliative Care (service hours Mon-Fri 8AM - 5PM)  [phone]      "

## 2025-03-27 NOTE — RESPIRATORY CARE
COPD EDUCATION by COPD CLINICAL EDUCATOR  3/27/2025 at 3:14 PM by Minal Stanton, RRT     Patient interviewed by COPD education team. Patient declined COPD program at this time.   Patient denies history of COPD but states she has Stage IV lung cancer. Patient denies any breathing treatments, inhalers or oxygen at home.  Patient declines smoking cessation at this time as well.

## 2025-03-27 NOTE — DISCHARGE PLANNING
Care Transition Team Discharge Planning    Anticipated Discharge Information  Discharge Disposition: D/T to home under [facility] care in anticipation of covered skilled care (06)    Discharge Plan:  RNCM attempted to meet with patient for assessment and HH choice. Procedure in process. Will re attempt as able.

## 2025-03-27 NOTE — ANTIMICROBIAL STEWARDSHIP
Antimicrobial Stewardship Rounds Note    Date  3/26/2025    Assessment  Patient chart reviewed during antimicrobial stewardship rounds with Dr. Verduzco.     Patient is a 80 y.o female with PMH significant for recurrent stage IV small cell lung cancer, breast cancer s/p partial mastectomy in 2011, and COPD currently on Unasyn and doxycycline for concern of postobstructive pneumonia given elevated procalcitonin of 10.9 on admission. CT scan from 3/25 show large right hydropneumothorax, multiple nodular masses in the right lower lobe, pleural-based masses posteriorly in the base of the right hemothorax suspicious for metastatic disease, nonobstructing nephrolithiasis, and 4.2 cm infrarenal aortic aneurysm. Chest X-ray from 3/26 show small right hydropneumothorax with right lung interstitial and airspace opacities. Elevated procalcitonin could be due to poor renal function in addition to inflammatory process from malignancy. She remains afebrile on room air with normal WBC. Low suspicion for bacterial pneumonia.     Recommendation  Based on the assessment above, the antimicrobial stewardship team recommends to monitor off antibiotics. Discussed with Dr. Sosa, who agreed. Orders were updated in the chart by this pharmacist.     Lor Gonzáles, PharmD   PGY-2 Infectious Disease Pharmacy Resident

## 2025-03-27 NOTE — CARE PLAN
Problem: Pain - Standard  Goal: Alleviation of pain or a reduction in pain to the patient’s comfort goal  Outcome: Progressing  Note: Assess and monitor for pain. Provide pharmacological and non-pharmacological interventions as appropriate. Re-evaluate and continue to monitor.        Problem: Knowledge Deficit - Standard  Goal: Patient and family/care givers will demonstrate understanding of plan of care, disease process/condition, diagnostic tests and medications  Outcome: Progressing     Problem: Fall Risk  Goal: Patient will remain free from falls  Outcome: Progressing     Problem: Communication  Goal: The ability to communicate needs accurately and effectively will improve  Outcome: Progressing   The patient is Watcher - Medium risk of patient condition declining or worsening    Shift Goals  Clinical Goals: thoracentesis, pain control  Patient Goals: pain control, eat and drink  Family Goals: updates    Progress made toward(s) clinical / shift goals:  thoracentesis completed, 1.1L removed. Patient remains on room air. Pain being controlled with PRN medication    Patient is not progressing towards the following goals:

## 2025-03-27 NOTE — PROGRESS NOTES
Hospital Medicine Daily Progress Note    Date of Service  3/27/2025    Chief Complaint  Emily Barnes is a 80 y.o. female admitted 3/25/2025 with back pain    Hospital Course:    80-year-old female with history of breast cancer, small cell lung cancer, smoking, and AAA who presented 3/25 with shortness of breath.  Patient has recurrent stage IV small cell lung cancer and she was on palliative chemotherapy patient noticed right back pain with no fever or chills and no significant chest pain or shortness of breath, patient was on room air, patient was found to have JUDE with creatinine 1.5 and calcium 6.7, IV fluids with IV calcium were started, CT scan of her L-spine and chest with abdomen showed large right hydropneumothorax, general surgeon was consulted and recommended IR.  Thoracentesis was ordered before considering politics.    Also CT scan showed multifocal nodules masses in the right lower lobe findings suspicious of metastatic disease also patient has mediastinal mass around 3.4 cm.        Interval Problem Update  -Evaluated examined the patient at bedside, patient is alert and oriented x 4, patient still having pain and ask for pain medication, start with morphine long-acting 15 mg twice daily  -Patient underwent thoracentesis, case was discussed with IR for possible Pleurx catheter placement tomorrow, n.p.o. after midnight  -Transfer the patient to oncology floor, palliative was consulted, hospice referral was placed, palliative team discussed the case with niece and patient and agreed for hospice referral   -replace magnesium and phosphorus      I have discussed this patient's plan of care and discharge plan at IDT rounds today with Case Management, Nursing, Nursing leadership, and other members of the IDT team.    Consultants/Specialty  General surgeon    Code Status  DNAR/DNI    Disposition  The patient is not medically cleared for discharge to home or a post-acute facility.  Anticipate discharge to:  home with organized home healthcare and close outpatient follow-up    I have placed the appropriate orders for post-discharge needs.    Review of Systems  Review of Systems   Constitutional:  Positive for malaise/fatigue and weight loss. Negative for chills and fever.   HENT:  Negative for ear pain, hearing loss and tinnitus.    Eyes:  Negative for blurred vision, double vision and photophobia.   Respiratory:  Negative for cough and hemoptysis.    Cardiovascular:  Negative for chest pain, palpitations, orthopnea and claudication.   Gastrointestinal:  Negative for abdominal pain, constipation, diarrhea, nausea and vomiting.   Genitourinary:  Negative for dysuria, frequency and urgency.   Musculoskeletal:  Positive for back pain. Negative for myalgias and neck pain.   Skin:  Negative for rash.   Neurological:  Negative for dizziness, speech change and weakness.        Physical Exam  Temp:  [36.1 °C (97 °F)-36.4 °C (97.5 °F)] 36.1 °C (97 °F)  Pulse:  [77-97] 95  Resp:  [17-18] 17  BP: ()/(48-71) 108/70  SpO2:  [93 %-94 %] 93 %    Physical Exam  Constitutional:       General: She is not in acute distress.     Appearance: She is ill-appearing.   HENT:      Head: Normocephalic and atraumatic.   Cardiovascular:      Rate and Rhythm: Normal rate.      Heart sounds: No murmur heard.  Pulmonary:      Effort: No respiratory distress.      Breath sounds: No wheezing or rales.   Abdominal:      General: There is no distension.      Tenderness: There is no abdominal tenderness.   Musculoskeletal:      Right lower leg: No edema.      Left lower leg: No edema.   Skin:     General: Skin is dry.      Coloration: Skin is pale.      Findings: No bruising or lesion.   Neurological:      General: No focal deficit present.      Mental Status: She is oriented to person, place, and time. Mental status is at baseline.      Cranial Nerves: No cranial nerve deficit.      Motor: No weakness.         Fluids    Intake/Output Summary (Last  24 hours) at 3/27/2025 1708  Last data filed at 3/27/2025 1522  Gross per 24 hour   Intake 1000 ml   Output 500 ml   Net 500 ml        Laboratory  Recent Labs     03/25/25  1513 03/26/25  0319   WBC 10.2 8.1   RBC 4.84 4.34   HEMOGLOBIN 15.3 13.2   HEMATOCRIT 43.9 39.3   MCV 90.7 90.6   MCH 31.6 30.4   MCHC 34.9 33.6   RDW 50.9* 50.6*   PLATELETCT 285 244   MPV 10.5 10.5     Recent Labs     03/25/25  2314 03/26/25  0319 03/27/25  0520   SODIUM 136 141 144   POTASSIUM 3.4* 3.2* 3.5*   CHLORIDE 104 110 117*   CO2 11* 14* 15*   GLUCOSE 115* 128* 87   BUN 97* 81* 41*   CREATININE 1.56* 1.05 0.59   CALCIUM 6.7* 6.8* 8.9     Recent Labs     03/26/25  1432   INR 1.54*               Imaging  DX-CHEST-PORTABLE (1 VIEW)   Final Result      No evidence of right-sided pneumothorax status post thoracentesis.      DX-CHEST-PORTABLE (1 VIEW)   Final Result      1.  Questionable small right pneumothorax.   2.  Small right pleural effusion the adjacent airspace disease.   3.  Cardiomegaly.      Dr. Reddy discussed these findings with Brianna Mcbride at 3/27/2025 1:20 PM      US-THORACENTESIS PUNCTURE RIGHT   Final Result      1. Ultrasound guided right sided diagnostic and therapeutic thoracentesis.      2. 1,100 mL of fluid withdrawn.      DX-CHEST-LIMITED (1 VIEW)   Final Result      1.  Increased moderate right pleural effusion with associated compressive atelectasis and/or consolidation.   2.  Mild diffuse interstitial prominence which may related to edema or infection.   3.  Stable enlargement of the cardiomediastinal silhouette.      DX-CHEST-LIMITED (1 VIEW)   Final Result      1.  Small right hydropneumothorax.   2.  Right lung interstitial and airspace opacities, greater inferiorly.      Findings conveyed to Dr. Sosa at 3/26/2025 9:19 AM via Voalte messaging.      CT-LSPINE W/O PLUS RECONS   Final Result   Impression:      1. Osteopenia and advanced degenerative disc disease at L4-L5.   2. No definite fracture or  malalignment.      CT-TSPINE W/O PLUS RECONS   Final Result      Multilevel degenerative disease without acute fracture or malalignment.      CT-CHEST,ABDOMEN,PELVIS W/O   Final Result      1.  Large right hydropneumothorax.      2.  Multifocal nodular masses in the right lower lobe. Additionally there are pleural-based masses posteriorly in the base of the right hemithorax. These findings are suspicious for metastatic disease.      3.  3.4 cm anterior mediastinal mass suspicious for metastatic disease.      4.  Nonobstructing left upper pole nephrolithiasis.      5.  4.2 cm infrarenal aortic aneurysm.      6.  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  Patient wants to be DNR/DNI   Follow-up with her oncologist    Start with morphine 15 mg twice daily  Hospice referral  Appreciate palliative recommendations.      Cancer associated pain  Assessment & Plan  Patient is always on right back pain  Images did not show CT images does not show metastasis however showed pleural effusion on the right side  Pain control start with morphine twice daily and oxycodone as needed  Palliative was consulted, hospice referral      Infrarenal abdominal aortic aneurysm  (AAA) without rupture (HCC)  Assessment & Plan  As seen on CT  Follow-up as outpatient  \Encourage the patient to quit smoking    Chronic right-sided low back pain without sciatica  Assessment & Plan  Acute on chronic  No risky symptoms of saddle anesthesia/bowel or bladder symptoms  Pain control and PT with OT  Consider MRI to rule out metastasis and there is no improving    Hypokalemia  Assessment & Plan  Likely related to low oral intake  Labs daily and replace orally    Mixed hyperlipidemia  Assessment & Plan  Resume home medication Lipitor    Primary hypertension  Assessment & Plan  Holding home med lisinopril in view of ongoing JUDE    Hydropneumothorax  Assessment & Plan  Large right sided, likely related to malignancy  Discussed with general surgery and IR, planning for thoracentesis at this time  Chest x-ray daily  Patient might need Pleurx and chest catheter  Patient is on room air, close monitoring and consider surgery consult stat for any worsening on respiratory status for chest tube placement..    Hypocalcemia  Assessment & Plan  Check vitamin D and magnesium  Replaced IV and orally  Continue monitoring telemetry    JUDE (acute kidney injury) (HCC)- (present on admission)  Assessment & Plan  Resolved  most likely prerenal.  Labs daily  Avoid nephrotoxic medications    Smoking- (present on admission)  Assessment & Plan  Heavy smoking for around 3 pack a day  Encouraged the patient to quit    I spent 5 minutes counseling the patient on cessation techniques and resources were offered including nicotine patches, gum, along with medical treatment with wellbutrin and chantix. The patient understands continuing to smoke could lead to complications such as lung disease, heart attack, stroke and death.  The benefits of stopping were also presented to him. The patient verbalized understanding. CPT CODE: 36492 (3-10 minutes tobacco counseling).      Hypomagnesemia  Assessment & Plan  Replace IV    ACP (advance care  planning)  Assessment & Plan  Patient was alert and oriented x 4, plan of care was discussed in detail with the patient and her niece on the phone, answered all her questions, discussed the advanced cancer, discussed all treatment options including hospice and comfort care, patient dictated that she wants to be DNR/DNI however refused hospice at this time, have more conversation tomorrow.  Niece states she is moving to [place] to be next to her.  Time 30-minute.    3/27 again the plan of care was discussed with the patient and palliative at bedside, answered all her questions, the patient agreed for hospice referral, DNR/DNI, patient wants to pain medication, she states she is always in pain, also palliative team discussed the case with the niece the power of  and agreed for hospice referral, time 25 minutes    Breast cancer - (present on admission)  Assessment & Plan   h/o in 2011  s/p partial mastectomy in 2011  As per pt she had refused chemotherapy then but was cancer free with repeat tests.  Last mammography in May 2024 which did not show any obvious malignancy  Follow-up with her oncologist as outpatient  Palliative was consulted, discussed the poor prognosis with the patient, patient agreed for DNR/DNI, agreed for hospice referral, also case was discussed with niece.           VTE prophylaxis:   SCDs/TEDs   heparin ppx      I have performed a physical exam and reviewed and updated ROS and Plan today (3/27/2025). In review of yesterday's note (3/26/2025), there are no changes except as documented above.      Greater than 51 minutes spent prepping to see patient (e.g. review of tests) obtaining and/or reviewing separately obtained history. Performing a medically appropriate examination and/ evaluation.  Counseling and educating the patient/family/caregiver.  Ordering medications, tests, or procedures.  Referring and communicating with other health care professionals.  Documenting clinical information in  EPIC.  Independently interpreting results and communicating results to patient/family/caregiver.  Care coordination

## 2025-03-27 NOTE — DISCHARGE PLANNING
Post Acute Navigator Team    Patient screened based off of following information:    EOL Index medium risk score 95  High LACE + score 73  Low 6 click Mobility score 21   Low 6 click ADL score 22   Readmission: no       Per chart review,  Palliative Care consultation was requested. post acute needs to be determined.         Please reach out to me directly should care team feel patient will benefit from a discharge goals of care conversation regarding outpatient palliative care or hospice.

## 2025-03-28 LAB
ALBUMIN SERPL BCP-MCNC: 3.1 G/DL (ref 3.2–4.9)
ALBUMIN/GLOB SERPL: 1.3 G/DL
ALP SERPL-CCNC: 62 U/L (ref 30–99)
ALT SERPL-CCNC: 21 U/L (ref 2–50)
ANION GAP SERPL CALC-SCNC: 10 MMOL/L (ref 7–16)
AST SERPL-CCNC: 44 U/L (ref 12–45)
BILIRUB SERPL-MCNC: 0.4 MG/DL (ref 0.1–1.5)
BUN SERPL-MCNC: 16 MG/DL (ref 8–22)
CALCIUM ALBUM COR SERPL-MCNC: 9.2 MG/DL (ref 8.5–10.5)
CALCIUM SERPL-MCNC: 8.5 MG/DL (ref 8.5–10.5)
CHLORIDE SERPL-SCNC: 115 MMOL/L (ref 96–112)
CO2 SERPL-SCNC: 19 MMOL/L (ref 20–33)
CREAT SERPL-MCNC: 0.4 MG/DL (ref 0.5–1.4)
ERYTHROCYTE [DISTWIDTH] IN BLOOD BY AUTOMATED COUNT: 52.7 FL (ref 35.9–50)
FUNGUS SPEC CULT: NORMAL
FUNGUS SPEC FUNGUS STN: NORMAL
FUNGUS SPEC FUNGUS STN: NORMAL
GFR SERPLBLD CREATININE-BSD FMLA CKD-EPI: 101 ML/MIN/1.73 M 2
GLOBULIN SER CALC-MCNC: 2.4 G/DL (ref 1.9–3.5)
GLUCOSE SERPL-MCNC: 121 MG/DL (ref 65–99)
HCT VFR BLD AUTO: 33.6 % (ref 37–47)
HGB BLD-MCNC: 11.9 G/DL (ref 12–16)
INR PPP: 1.49 (ref 0.87–1.13)
MAGNESIUM SERPL-MCNC: 1.5 MG/DL (ref 1.5–2.5)
MCH RBC QN AUTO: 31.7 PG (ref 27–33)
MCHC RBC AUTO-ENTMCNC: 35.4 G/DL (ref 32.2–35.5)
MCV RBC AUTO: 89.6 FL (ref 81.4–97.8)
MRSA SPEC QL CULT: NORMAL
PATH REV: NORMAL
PATH REV: NORMAL
PHOSPHATE SERPL-MCNC: 2.2 MG/DL (ref 2.5–4.5)
PLATELET # BLD AUTO: 224 K/UL (ref 164–446)
PMV BLD AUTO: 10.3 FL (ref 9–12.9)
POTASSIUM SERPL-SCNC: 3.9 MMOL/L (ref 3.6–5.5)
PROT SERPL-MCNC: 5.5 G/DL (ref 6–8.2)
PROTHROMBIN TIME: 18 SEC (ref 12–14.6)
RBC # BLD AUTO: 3.75 M/UL (ref 4.2–5.4)
SIGNIFICANT IND 70042: NORMAL
SITE SITE: NORMAL
SODIUM SERPL-SCNC: 144 MMOL/L (ref 135–145)
SOURCE SOURCE: NORMAL
WBC # BLD AUTO: 6.1 K/UL (ref 4.8–10.8)

## 2025-03-28 PROCEDURE — 83735 ASSAY OF MAGNESIUM: CPT

## 2025-03-28 PROCEDURE — A9270 NON-COVERED ITEM OR SERVICE: HCPCS | Performed by: NURSE PRACTITIONER

## 2025-03-28 PROCEDURE — 99233 SBSQ HOSP IP/OBS HIGH 50: CPT | Performed by: INTERNAL MEDICINE

## 2025-03-28 PROCEDURE — 84100 ASSAY OF PHOSPHORUS: CPT

## 2025-03-28 PROCEDURE — A9270 NON-COVERED ITEM OR SERVICE: HCPCS | Performed by: INTERNAL MEDICINE

## 2025-03-28 PROCEDURE — 700111 HCHG RX REV CODE 636 W/ 250 OVERRIDE (IP): Performed by: INTERNAL MEDICINE

## 2025-03-28 PROCEDURE — 700102 HCHG RX REV CODE 250 W/ 637 OVERRIDE(OP): Performed by: NURSE PRACTITIONER

## 2025-03-28 PROCEDURE — 80053 COMPREHEN METABOLIC PANEL: CPT

## 2025-03-28 PROCEDURE — 700102 HCHG RX REV CODE 250 W/ 637 OVERRIDE(OP): Performed by: INTERNAL MEDICINE

## 2025-03-28 PROCEDURE — 85610 PROTHROMBIN TIME: CPT

## 2025-03-28 PROCEDURE — 770004 HCHG ROOM/CARE - ONCOLOGY PRIVATE *

## 2025-03-28 PROCEDURE — 85027 COMPLETE CBC AUTOMATED: CPT

## 2025-03-28 RX ORDER — POLYETHYLENE GLYCOL 3350 17 G/17G
1 POWDER, FOR SOLUTION ORAL 2 TIMES DAILY
Status: DISCONTINUED | OUTPATIENT
Start: 2025-03-28 | End: 2025-04-01 | Stop reason: HOSPADM

## 2025-03-28 RX ORDER — MAGNESIUM SULFATE HEPTAHYDRATE 40 MG/ML
2 INJECTION, SOLUTION INTRAVENOUS ONCE
Status: COMPLETED | OUTPATIENT
Start: 2025-03-28 | End: 2025-03-28

## 2025-03-28 RX ORDER — OXYCODONE HYDROCHLORIDE 5 MG/1
5 TABLET ORAL EVERY 4 HOURS PRN
Refills: 0 | Status: DISCONTINUED | OUTPATIENT
Start: 2025-03-28 | End: 2025-04-01 | Stop reason: HOSPADM

## 2025-03-28 RX ORDER — DEXAMETHASONE 4 MG/1
4 TABLET ORAL 2 TIMES DAILY
Status: DISCONTINUED | OUTPATIENT
Start: 2025-03-28 | End: 2025-04-01 | Stop reason: HOSPADM

## 2025-03-28 RX ORDER — AMOXICILLIN 250 MG
2 CAPSULE ORAL EVERY EVENING
Status: DISCONTINUED | OUTPATIENT
Start: 2025-03-28 | End: 2025-04-01 | Stop reason: HOSPADM

## 2025-03-28 RX ORDER — OXYCODONE HYDROCHLORIDE 10 MG/1
10 TABLET ORAL EVERY 4 HOURS PRN
Refills: 0 | Status: DISCONTINUED | OUTPATIENT
Start: 2025-03-28 | End: 2025-04-01 | Stop reason: HOSPADM

## 2025-03-28 RX ADMIN — DEXAMETHASONE 4 MG: 4 TABLET ORAL at 04:48

## 2025-03-28 RX ADMIN — SODIUM BICARBONATE 650 MG: 650 TABLET ORAL at 20:36

## 2025-03-28 RX ADMIN — Medication 400 MG: at 04:48

## 2025-03-28 RX ADMIN — MORPHINE SULFATE 15 MG: 15 TABLET, FILM COATED, EXTENDED RELEASE ORAL at 17:05

## 2025-03-28 RX ADMIN — MAGNESIUM SULFATE HEPTAHYDRATE 2 G: 2 INJECTION, SOLUTION INTRAVENOUS at 09:05

## 2025-03-28 RX ADMIN — DIBASIC SODIUM PHOSPHATE, MONOBASIC POTASSIUM PHOSPHATE AND MONOBASIC SODIUM PHOSPHATE 250 MG: 852; 155; 130 TABLET ORAL at 17:05

## 2025-03-28 RX ADMIN — OXYCODONE HYDROCHLORIDE 10 MG: 10 TABLET ORAL at 02:12

## 2025-03-28 RX ADMIN — MOMETASONE FUROATE AND FORMOTEROL FUMARATE DIHYDRATE 2 PUFF: 100; 5 AEROSOL RESPIRATORY (INHALATION) at 04:49

## 2025-03-28 RX ADMIN — DEXAMETHASONE 4 MG: 4 TABLET ORAL at 17:05

## 2025-03-28 RX ADMIN — MOMETASONE FUROATE AND FORMOTEROL FUMARATE DIHYDRATE 2 PUFF: 100; 5 AEROSOL RESPIRATORY (INHALATION) at 17:03

## 2025-03-28 RX ADMIN — OXYCODONE HYDROCHLORIDE 10 MG: 10 TABLET ORAL at 10:38

## 2025-03-28 RX ADMIN — POLYETHYLENE GLYCOL 3350 1 PACKET: 17 POWDER, FOR SOLUTION ORAL at 17:05

## 2025-03-28 RX ADMIN — OXYCODONE HYDROCHLORIDE 10 MG: 10 TABLET ORAL at 18:04

## 2025-03-28 RX ADMIN — SENNOSIDES AND DOCUSATE SODIUM 2 TABLET: 50; 8.6 TABLET ORAL at 17:05

## 2025-03-28 RX ADMIN — DIBASIC SODIUM PHOSPHATE, MONOBASIC POTASSIUM PHOSPHATE AND MONOBASIC SODIUM PHOSPHATE 250 MG: 852; 155; 130 TABLET ORAL at 08:49

## 2025-03-28 RX ADMIN — DIBASIC SODIUM PHOSPHATE, MONOBASIC POTASSIUM PHOSPHATE AND MONOBASIC SODIUM PHOSPHATE 250 MG: 852; 155; 130 TABLET ORAL at 12:15

## 2025-03-28 RX ADMIN — TIOTROPIUM BROMIDE INHALATION SPRAY 5 MCG: 3.12 SPRAY, METERED RESPIRATORY (INHALATION) at 04:49

## 2025-03-28 RX ADMIN — SODIUM BICARBONATE 650 MG: 650 TABLET ORAL at 15:44

## 2025-03-28 RX ADMIN — SODIUM BICARBONATE 650 MG: 650 TABLET ORAL at 08:49

## 2025-03-28 RX ADMIN — MORPHINE SULFATE 15 MG: 15 TABLET, FILM COATED, EXTENDED RELEASE ORAL at 04:48

## 2025-03-28 RX ADMIN — Medication 5 MG: at 20:35

## 2025-03-28 ASSESSMENT — ENCOUNTER SYMPTOMS
BACK PAIN: 1
DOUBLE VISION: 0
DIARRHEA: 0
PALPITATIONS: 0
ORTHOPNEA: 0
WEIGHT LOSS: 1
INSOMNIA: 1
DIZZINESS: 0
NERVOUS/ANXIOUS: 0
HEMOPTYSIS: 0
SHORTNESS OF BREATH: 1
VOMITING: 0
NAUSEA: 0
BLURRED VISION: 0
MYALGIAS: 0
CONSTIPATION: 0
NECK PAIN: 0
WEAKNESS: 0
ABDOMINAL PAIN: 0
CHILLS: 0
SPEECH CHANGE: 0
CLAUDICATION: 0
FEVER: 0
COUGH: 0
PHOTOPHOBIA: 0

## 2025-03-28 ASSESSMENT — PAIN DESCRIPTION - PAIN TYPE
TYPE: ACUTE PAIN
TYPE: ACUTE PAIN
TYPE: CHRONIC PAIN
TYPE: ACUTE PAIN
TYPE: ACUTE PAIN

## 2025-03-28 ASSESSMENT — FIBROSIS 4 INDEX: FIB4 SCORE: 4.3

## 2025-03-28 NOTE — DISCHARGE PLANNING
Care Transition Team Discharge Planning    Anticipated Discharge Information  Discharge Disposition: D/T to hospice home (50)  Discharge Address: [address] [place] [address]  Discharge Contact Phone Number: [phone]    Discharge Plan:  RNCM placed call to patient's mary Restrepo [phone] to discuss hospice choice per patient request. She reports she is on the phone with patient' insurance but took down RNCM phone number and will return call as soon as she is done.     0930: Received call back from mary Maguire who states she is working with her cousin to decide on hospice choice and she will call RNCM once they have choice.    1125: Patient discussed in IDT rounds. Anticipate pleurex cath placement Monday then anticipate home with hospice.

## 2025-03-28 NOTE — DISCHARGE PLANNING
Care Transition Team Assessment  Patient is a 80 year-old female admitted for abdominal pain. Please see pt's H&P for prior medical history. RNCM met with pt at bedside to complete assessment. Pt A&Ox4 and able to verify the information on the face sheet. Pt lives alone in a single-story, ground floor apt. Emergency contact is mary Restrepo [phone] who lives in [facility] but is here visiting patient to stay with her indefinitely. Prior to admission patient is independent with ADL's and IADL’s. Pt ambulates without assistive device and uses nocturnal O2 at baseline (patient unsure of amount or DME company, states it just arrived at her house one day). 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 The patient's PCP is Dr. Jc. Patient's preferred pharmacy is [facility]. Pt denies any SA or MH concerns, reports she is a cigarette smoker. Patients confirmed medical coverage with [facility] [facility] and Medicare.  Patient has means to transportation and mary Maguire will provide transport once medically stable for discharge. RNCM discussed discharge planning. Patient reported pt's goal is to return home once medically stable.      Information Source  Orientation Level: Oriented X4  Information Given By: Patient  Who is responsible for making decisions for patient? : Patient    Readmission Evaluation  Is this a readmission?: No    Elopement Risk  Legal Hold: No  Ambulatory or Self Mobile in Wheelchair: No-Not an Elopement Risk  Disoriented: No  Psychiatric Symptoms: None  History of Wandering: No  Elopement this Admit: No  Vocalizing Wanting to Leave: No  Displays Behaviors, Body Language Wanting to Leave: No-Not at Risk for Elopement  Elopement Risk: Not at Risk for Elopement    Interdisciplinary Discharge Planning  Lives with - Patient's Self Care Capacity: Alone and Able to Care For Self  Patient or legal guardian wants to designate a caregiver:  No  Support Systems: Family Member(s)  Housing / Facility: 2 Story Apartment / Condo    Discharge Preparedness  What is your plan after discharge?: Home with help  What are your discharge supports?: Other (comment) (niece)  Prior Functional Level: Ambulatory, Drives Self, Independent with Activities of Daily Living, Independent with Medication Management  Difficulity with ADLs: None  Difficulity with IADLs: None    Functional Assesment  Prior Functional Level: Ambulatory, Drives Self, Independent with Activities of Daily Living, Independent with Medication Management    Finances  Financial Barriers to Discharge: No  Prescription Coverage: Yes    Vision / Hearing Impairment  Vision Impairment : Yes  Right Eye Vision: Impaired, Wears Glasses  Left Eye Vision: Impaired, Wears Glasses  Hearing Impairment : No    Advance Directive  Advance Directive?: DPOA for Health Care  Durable Power of  Name and Contact : per patient, mary Restrepo [phone]    Domestic Abuse  Have you ever been the victim of abuse or violence?: No  Possible Abuse/Neglect Reported to:: Not Applicable    Psychological Assessment  History of Substance Abuse: None  History of Psychiatric Problems: No  Non-compliant with Treatment: No  Newly Diagnosed Illness: No    Discharge Risks or Barriers  Discharge risks or barriers?: Complex medical needs  Patient risk factors: Vulnerable adult    Anticipated Discharge Information  Discharge Disposition: D/T to hospice home (50)  Discharge Address: [address] [place] [address] [address] [facility] [address]  Discharge Contact Phone Number: [phone]  Case Management Discharge Planning    Admission Date: 3/25/2025  GMLOS: 3  ALOS: 3    6-Clicks ADL Score: 21  6-Clicks Mobility Score: 22    Anticipated Discharge Dispo: Discharge Disposition: D/T to hospice home (50)  Discharge Address: [address] [place] [address] [address] [facility] [address]  Discharge Contact Phone Number: [phone]    DME Needed: No    Action(s) Taken: DANIS  Assessment Complete (See below)    Escalations Completed: None    Medically Clear: No    Next Steps: Patient deferred hospice agency choice to her niece Andrez. RNCM to call Andrez for choice.    Barriers to Discharge: Medical clearance and DME

## 2025-03-28 NOTE — ASSESSMENT & PLAN NOTE
Recurrent pleural effusion on the right side  Cytology last time was positive for malignancy cells  Thoracentesis was done on 3/27  IR was consulted for Pleurx catheter

## 2025-03-28 NOTE — PROGRESS NOTES
"MRN: [id number]  Date of palliative consult: 03/27/25  Reason for consult: GOC/ACP  Referring provider: Dr. Sosa  Location of consult: T830-00  Additional consulting services: General surgery 3/25    HPI:   Emily Barnes is a 80 y.o. female with medical history significant for stage IV small cell lung cancer off chemo for several months, history of breast cancer in 2011 status post partial mastectomy with refusal chemotherapy and reached remission with surgery alone, brought in by EMS 3/25/2025 for abdominal and acute on chronic abdominal and low back pain.  Hypotensive and route.  Imaging notable for large right hydropneumothorax.  Surgery consulted and given that patient did not have signs or symptoms of respiratory distress saturating well on room air there was no indication for urgent thoracostomy tube placement and recommended discussion of pleural catheter drain with IR.  Hospital medicine discussed with IR and general surgery with plan for thoracentesis first prior to drain placement.    Additional Pertinent Medical History: COPD, ongoing tobacco use, AAA without rupture, arthritis, HTN, HLD    Patient is having severe excruciating back pain since last chemotherapy which has been longstanding.  Pain is primarily in her right lower back and is constant \"it feels like somebody is sticking a knife in me and twisting it.\"  She is taking ibuprofen every 3 hours at home with little relief.  She reports she does not remember the last time she had a good night sleep due to the pain and only sleeps in about 15-minute increments.  She joked that she would like \"eternal rest\" given for pain management and its effect on her life.    Interval History  3/28 patient's niece Andrez at bedside.  Patient with improved pain.  Patient states its \"pretty okay right now\" in regards to her low back pain.  Has been rating pain 6/10. She is still not sleeping well.  Andrez inquired about melatonin stating it helped her sleep the " other night.  Patient wishes to go home sooner rather than later however Andrez would like to ensure patient is able to have option of Pleurx drain placed and is still working on getting hospice set up.  Answered questions regarding packet of logistical information to answer questions they may have now or in the future provided yesterday. Information provied includes Helpful Information for this Difficult Time packet, mortuary list, bereavement resources, and palliative care contact information.    ROS:    Review of Systems   Constitutional:  Positive for malaise/fatigue.   Respiratory:  Positive for shortness of breath.    Genitourinary:  Positive for urgency.        Andrez inquiring with RN about replacing Pure Wick   Musculoskeletal:  Positive for back pain.   Psychiatric/Behavioral:  The patient has insomnia (related to pain). The patient is not nervous/anxious.      PE:   Recent vital signs  BMI: Body mass index is 25.85 kg/m².    Temp (24hrs), Av.4 °C (97.6 °F), Min:36.1 °C (97 °F), Max:36.7 °C (98 °F)  Temperature: 36.6 °C (97.9 °F), Monitored Temp: 36.6 °C (97.9 °F)  Pulse  Av.5  Min: 56  Max: 102   Blood Pressure : 120/73       Physical Exam  Constitutional:       General: She is not in acute distress.  Pulmonary:      Comments: Increased work of breathing after assisting patient to BSC with SBA  Abdominal:      General: Bowel sounds are increased.      Palpations: Abdomen is soft.   Musculoskeletal:      Right lower leg: No edema.      Left lower leg: No edema.   Neurological:      Mental Status: She is oriented to person, place, and time.      Comments: Oriented x 3   Psychiatric:         Attention and Perception: Attention normal.         Mood and Affect: Mood and affect normal.         Speech: Speech normal.         Behavior: Behavior normal.         Thought Content: Thought content normal.         Cognition and Memory: Cognition normal.         Judgment: Judgment normal.       ASSESSMENT/PLAN  WITH SHARED DECISION MAKING:   PHYSICAL ASPECTS OF CARE  Palliative Performance Scale: 50%    # Stage IV small cell lung cancer  # Hydropneumothorax  Status post thoracentesis with 1 L of  Evaluation by IR pending for Pleurx catheter  # JUDE  # Chronic right-sided low back pain without sciatica  Continue MS ER 15 mg PO Q 12 hours  Continue 7 day course of dexamethasone 4 mg p.o. twice daily  Adjusted oxycodone 5-10 mg PO Q 4 hours PRN mod-severe breakthrough pain  #Insomnia  Scheduled melatonin 5 mg PO nightly  #COPD  # History of AAA  #History of breast cancer in remission    SOCIAL ASPECTS OF CARE  .  Patient's niece/goddaughter who lives in [facility] Andrez Restrepo who has traveled to [place] to take care of patient through the end of her life.  Patient has 3 adopted children however she is not in contact with them.  To live in [place] and 1 lives in [place].  Patient grew  up in a household of 8 children and 3 adults.  She continues to smoke cigarettes stating it was very difficult for her to quit after 6 to 8 years and she understood the risk.    SPIRITUAL ASPECTS OF CARE   Patient is Christian.  She declined spiritual care visit from .  Discussed this would be available to her at home on hospice care.  Updated patient's niece Andrez as she feels patient will want anointing of the sick.  Circled back with patient who confirms she does not currently want.    GOALS OF CARE/SERIOUS ILLNESS CONVERSATION  3/27 Prior goals of care discussion reviewed per Dr. Sosa patient expressed wishes for DNR/DNI, agreeable for any procedures, not ready for hospice.    Introduced myself to patient. Discussed role of palliative care and reason for consult.  She is agreeable to discuss goals of care.  We had a long discussion and she stated that she is ready for the end of her life and just wants to be at home.  She joked about wanting internal rest due to being in pain and not being able to sleep.   She does not want her cancer treated.  She confirmed earlier she declined hospice as she did not want to go live at a hospice facility.  She confirmed that her niece Andrez told her hospice would come to the home.    I confirmed hospice can be done at home in the community and provided extensive education on hospice care and hospice philosophy.  She was agreeable to hospice referral.  We discussed symptom management.  I offered to call her niece.  Call placed to patient's niece and provided overview of above.  Andrez is agreeable with aggressive symptom management and hospice referral.  Andrez is hoping patient can have a drain placed to control her symptoms better at home after Andrez's experience caring for her own mother. I provided extensive education on hospice care in our community and answered all of Andrez's questions.  Discussed hospice referral process.  Andrez believes that patient obtained a plot in [place] but asked about mortuary list.  Andrez confirms she is planning to take care of patient through the end of her life.    Patient agreeable to hospice referral.  Recommended completion of new POLST form.  Patient confirms she has a DNR POLST at home.  We updated POLST form for DNR, comfort focused treatment, no artificial nutrition, no feeding IV fluids. Provided palliative care contact information to patient and her niece Andrez.  Updated Dr. Sosa.     3/28 pending hospice choice.  Pending IR drain placement planned.  Patient's caregiver/niece/goddaughter Andrez prefers patient have drain placed prior to discharge home as she fears patient will have a crisis at home requiring her to return to the hospital.  Encouraged them to talk amongst themselves.  Reviewed POLST form with Andrez.  All questions answered and concerns addressed.     Code Status: DNR/DNI    ACP Documents: None    Interval diagnostic studies and medical documentation entries pertinent to this case were reviewed independently by me.  "This patient has at least one acute or chronic illness or injury that poses a threat to life or bodily function. This patient suffers from a high risk of morbidity from additional invasive diagnostic testing or intensive treatment. Discussion of recommendations and coordination of care undertaken with primary provider/treatment team.      Santa \"Caron\" AMNA Carrera, [facility]  Inpatient Palliative Care (service hours Mon-Fri 8AM - 5PM)  [phone]      "

## 2025-03-28 NOTE — CARE PLAN
The patient is Watcher - Medium risk of patient condition declining or worsening    Shift Goals  Clinical Goals: monitor O2, increase mobility  Patient Goals: go home  Family Goals: emeterio    Progress made toward(s) clinical / shift goals:    Problem: Pain - Standard  Goal: Alleviation of pain or a reduction in pain to the patient’s comfort goal  Outcome: Progressing     Problem: Knowledge Deficit - Standard  Goal: Patient and family/care givers will demonstrate understanding of plan of care, disease process/condition, diagnostic tests and medications  Outcome: Progressing     Problem: Fall Risk  Goal: Patient will remain free from falls  Outcome: Progressing     Problem: Communication  Goal: The ability to communicate needs accurately and effectively will improve  Outcome: Progressing

## 2025-03-28 NOTE — PROGRESS NOTES
Hospital Medicine Daily Progress Note    Date of Service  3/28/2025    Chief Complaint  Emily Barnes is a 80 y.o. female admitted 3/25/2025 with back pain    Hospital Course:    80-year-old female with history of breast cancer, small cell lung cancer, smoking, and AAA who presented 3/25 with shortness of breath.  Patient has recurrent stage IV small cell lung cancer and she was on palliative chemotherapy patient noticed right back pain with no fever or chills and no significant chest pain or shortness of breath, patient was on room air, patient was found to have JUDE with creatinine 1.5 and calcium 6.7, IV fluids with IV calcium were started, CT scan of her L-spine and chest with abdomen showed large right hydropneumothorax, general surgeon was consulted and recommended IR.  Thoracentesis was ordered before considering politics.    Also CT scan showed multifocal nodules masses in the right lower lobe findings suspicious of metastatic disease also patient has mediastinal mass around 3.4 cm.      Interval Problem Update  -Evaluated examined the patient at bedside,   -Replace magnesium and phosphorus  -Labs reviewed  -Continue morphine ER 15 mg twice daily, add MiraLAX.  -Chest x-ray reviewed, discussed with IR reevaluate the patient for catheter on Monday  -Again the case was discussed with the patient and niece who is a power of  at bedside, answered all their questions, they agreed for hospice referral, planning to get catheter and discharge the patient with hospice.        I have discussed this patient's plan of care and discharge plan at IDT rounds today with Case Management, Nursing, Nursing leadership, and other members of the IDT team.    Consultants/Specialty  General surgeon    Code Status  DNAR/DNI    Disposition  The patient is not medically cleared for discharge to home or a post-acute facility.  Anticipate discharge to: hospice    I have placed the appropriate orders for post-discharge  needs.    Review of Systems  Review of Systems   Constitutional:  Positive for malaise/fatigue and weight loss. Negative for chills and fever.   HENT:  Negative for ear pain, hearing loss and tinnitus.    Eyes:  Negative for blurred vision, double vision and photophobia.   Respiratory:  Negative for cough and hemoptysis.    Cardiovascular:  Negative for chest pain, palpitations, orthopnea and claudication.   Gastrointestinal:  Negative for abdominal pain, constipation, diarrhea, nausea and vomiting.   Genitourinary:  Negative for dysuria, frequency and urgency.   Musculoskeletal:  Positive for back pain. Negative for myalgias and neck pain.   Skin:  Negative for rash.   Neurological:  Negative for dizziness, speech change and weakness.        Physical Exam  Temp:  [36.1 °C (97 °F)-36.7 °C (98 °F)] 36.6 °C (97.9 °F)  Pulse:  [] 56  Resp:  [16-18] 17  BP: (108-133)/(63-80) 120/73  SpO2:  [88 %-95 %] 95 %    Physical Exam  Constitutional:       General: She is not in acute distress.     Appearance: She is ill-appearing.   HENT:      Head: Normocephalic and atraumatic.   Cardiovascular:      Rate and Rhythm: Normal rate.      Heart sounds: No murmur heard.  Pulmonary:      Effort: No respiratory distress.      Breath sounds: No wheezing or rales.   Abdominal:      General: There is no distension.      Tenderness: There is no abdominal tenderness.   Musculoskeletal:      Right lower leg: No edema.      Left lower leg: No edema.   Skin:     General: Skin is dry.      Coloration: Skin is pale.      Findings: No bruising or lesion.   Neurological:      General: No focal deficit present.      Mental Status: She is oriented to person, place, and time. Mental status is at baseline.      Cranial Nerves: No cranial nerve deficit.      Motor: No weakness.         Fluids    Intake/Output Summary (Last 24 hours) at 3/28/2025 1443  Last data filed at 3/28/2025 0914  Gross per 24 hour   Intake 930 ml   Output 300 ml   Net 630  ml        Laboratory  Recent Labs     03/25/25  1513 03/26/25  0319 03/28/25  0518   WBC 10.2 8.1 6.1   RBC 4.84 4.34 3.75*   HEMOGLOBIN 15.3 13.2 11.9*   HEMATOCRIT 43.9 39.3 33.6*   MCV 90.7 90.6 89.6   MCH 31.6 30.4 31.7   MCHC 34.9 33.6 35.4   RDW 50.9* 50.6* 52.7*   PLATELETCT 285 244 224   MPV 10.5 10.5 10.3     Recent Labs     03/26/25  0319 03/27/25  0520 03/28/25  0518   SODIUM 141 144 144   POTASSIUM 3.2* 3.5* 3.9   CHLORIDE 110 117* 115*   CO2 14* 15* 19*   GLUCOSE 128* 87 121*   BUN 81* 41* 16   CREATININE 1.05 0.59 0.40*   CALCIUM 6.8* 8.9 8.5     Recent Labs     03/26/25  1432 03/28/25  0518   INR 1.54* 1.49*               Imaging  DX-CHEST-LIMITED (1 VIEW)   Final Result      1.  Diffuse opacification of the right lower lobe consistent with pneumonitis and/or pleural effusion.      2.  Blunting of right costophrenic angle consistent with pleural effusion.      DX-CHEST-PORTABLE (1 VIEW)   Final Result      No evidence of right-sided pneumothorax status post thoracentesis.      DX-CHEST-PORTABLE (1 VIEW)   Final Result      1.  Questionable small right pneumothorax.   2.  Small right pleural effusion the adjacent airspace disease.   3.  Cardiomegaly.      Dr. Reddy discussed these findings with Brianna Mcbride at 3/27/2025 1:20 PM      US-THORACENTESIS PUNCTURE RIGHT   Final Result      1. Ultrasound guided right sided diagnostic and therapeutic thoracentesis.      2. 1,100 mL of fluid withdrawn.      DX-CHEST-LIMITED (1 VIEW)   Final Result      1.  Increased moderate right pleural effusion with associated compressive atelectasis and/or consolidation.   2.  Mild diffuse interstitial prominence which may related to edema or infection.   3.  Stable enlargement of the cardiomediastinal silhouette.      DX-CHEST-LIMITED (1 VIEW)   Final Result      1.  Small right hydropneumothorax.   2.  Right lung interstitial and airspace opacities, greater inferiorly.      Findings conveyed to Dr. Sosa at 3/26/2025 9:19  AM via Voalte messaging.      CT-LSPINE W/O PLUS RECONS   Final Result   Impression:      1. Osteopenia and advanced degenerative disc disease at L4-L5.   2. No definite fracture or malalignment.      CT-TSPINE W/O PLUS RECONS   Final Result      Multilevel degenerative disease without acute fracture or malalignment.      CT-CHEST,ABDOMEN,PELVIS W/O   Final Result      1.  Large right hydropneumothorax.      2.  Multifocal nodular masses in the right lower lobe. Additionally there are pleural-based masses posteriorly in the base of the right hemithorax. These findings are suspicious for metastatic disease.      3.  3.4 cm anterior mediastinal mass suspicious for metastatic disease.      4.  Nonobstructing left upper pole nephrolithiasis.      5.  4.2 cm infrarenal aortic aneurysm.      6.  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  Patient wants to be DNR/DNI   Follow-up with her oncologist    Start with morphine 15 mg twice daily  Hospice referral  Appreciate palliative recommendations.    Planning discharge the patient with hospice after getting Pleurx catheter on Monday        Cancer associated pain  Assessment & Plan  Patient is always on right  back pain  Images did not show CT images does not show metastasis however showed pleural effusion on the right side  Pain control start with morphine twice daily and oxycodone as needed  Palliative was consulted, hospice referral      Infrarenal abdominal aortic aneurysm (AAA) without rupture (HCC)  Assessment & Plan  As seen on CT  Follow-up as outpatient  \Encourage the patient to quit smoking    Chronic right-sided low back pain without sciatica  Assessment & Plan  Acute on chronic  No risky symptoms of saddle anesthesia/bowel or bladder symptoms  Pain control and PT with OT  Consider MRI to rule out metastasis and there is no improving    Hypokalemia  Assessment & Plan  Likely related to low oral intake  Labs daily and replace orally    Mixed hyperlipidemia  Assessment & Plan  Resume home medication Lipitor    Primary hypertension  Assessment & Plan  Holding home med lisinopril in view of ongoing JUDE    Hydropneumothorax  Assessment & Plan  Large right sided, likely related to malignancy  Discussed with general surgery and IR, planning for thoracentesis at this time  Chest x-ray daily  Patient might need Pleurx and chest catheter  Patient is on room air, close monitoring and consider surgery consult stat for any worsening on respiratory status for chest tube placement..    Hypocalcemia  Assessment & Plan  Check vitamin D and magnesium  Replaced IV and orally  Continue monitoring telemetry    JUDE (acute kidney injury) (HCC)- (present on admission)  Assessment & Plan  Resolved  most likely prerenal.  Labs daily  Avoid nephrotoxic medications    Smoking- (present on admission)  Assessment & Plan  Heavy smoking for around 3 pack a day  Encouraged the patient to quit    I spent 5 minutes counseling the patient on cessation techniques and resources were offered including nicotine patches, gum, along with medical treatment with wellbutrin and chantix. The patient understands continuing to smoke could lead to  complications such as lung disease, heart attack, stroke and death.  The benefits of stopping were also presented to him. The patient verbalized understanding. CPT CODE: 51997 (3-10 minutes tobacco counseling).      Hypomagnesemia  Assessment & Plan  Replace IV    ACP (advance care planning)  Assessment & Plan  Patient was alert and oriented x 4, plan of care was discussed in detail with the patient and her niece on the phone, answered all her questions, discussed the advanced cancer, discussed all treatment options including hospice and comfort care, patient dictated that she wants to be DNR/DNI however refused hospice at this time, have more conversation tomorrow.  Niece states she is moving to [place] to be next to her.  Time 30-minute.    3/27 again the plan of care was discussed with the patient and palliative at bedside, answered all her questions, the patient agreed for hospice referral, DNR/DNI, patient wants to pain medication, she states she is always in pain, also palliative team discussed the case with the niece the power of  and agreed for hospice referral, time 25 minutes    Breast cancer - (present on admission)  Assessment & Plan   h/o in 2011  s/p partial mastectomy in 2011  As per pt she had refused chemotherapy then but was cancer free with repeat tests.  Last mammography in May 2024 which did not show any obvious malignancy  Follow-up with her oncologist as outpatient  Palliative was consulted, discussed the poor prognosis with the patient, patient agreed for DNR/DNI, agreed for hospice referral, also case was discussed with niece.           VTE prophylaxis: VTE Selection    I have performed a physical exam and reviewed and updated ROS and Plan today (3/28/2025). In review of yesterday's note (3/27/2025), there are no changes except as documented above.      Greater than 51 minutes spent prepping to see patient (e.g. review of tests) obtaining and/or reviewing separately obtained history.  Performing a medically appropriate examination and/ evaluation.  Counseling and educating the patient/family/caregiver.  Ordering medications, tests, or procedures.  Referring and communicating with other health care professionals.  Documenting clinical information in EPIC.  Independently interpreting results and communicating results to patient/family/caregiver.  Care coordination

## 2025-03-28 NOTE — CARE PLAN
The patient is Stable - Low risk of patient condition declining or worsening    Shift Goals  Clinical Goals: Pain management, monitor labs, VSS  Patient Goals: rest, pain relief  Family Goals: emeterio    Progress made toward(s) clinical / shift goals:      Problem: Pain - Standard  Goal: Alleviation of pain or a reduction in pain to the patient’s comfort goal  Outcome: Progressing     Problem: Knowledge Deficit - Standard  Goal: Patient and family/care givers will demonstrate understanding of plan of care, disease process/condition, diagnostic tests and medications  Outcome: Progressing       Patient is not progressing towards the following goals:

## 2025-03-28 NOTE — DISCHARGE PLANNING
Post Acute Navigator Team    Per chart review Palliative Care APRN recommend hospice and referral was placed.   CM team is following up with family to obtain hospice choice and will be arranging discharge.     Please reach out to me directly should you have any questions or concerns.

## 2025-03-28 NOTE — PROGRESS NOTES
Bedside report received from off going RN/tech: KHALIF Tomas, assumed care of patient.     Fall Risk Score: HIGH RISK  Fall risk interventions in place: Place yellow fall risk ID band on patient, Provide patient/family education based on risk assessment, Educate patient/family to call staff for assistance when getting out of bed, Place fall precaution signage outside patient door, Utilize bed/chair fall alarm, Notify charge of high risk for huddle, and Bed alarm connected correctly  Bed type: Regular (Romario Score less than 17 interventions in place)  Patient on cardiac monitor: Yes  IVF/IV medications: Not Applicable   Oxygen: Room Air  Bedside sitter: Not Applicable   Isolation: Not applicable

## 2025-03-28 NOTE — PROGRESS NOTES
"  DATE: 3/28/2025    Hospital Day 3 hydropneumothorax     INTERVAL EVENTS:  Thoracentesis yesterday - 1+ L removed   Denies SOB or respiratory distress  Irritated she is still in the hospital   Wants to go home     PHYSICAL EXAMINATION:  Vital Signs: /73   Pulse (!) 56   Temp 36.6 °C (97.9 °F) (Temporal)   Resp 17   Ht 1.6 m (5' 3\")   Wt 66.2 kg (145 lb 15.1 oz)   SpO2 95%     A&O x 4  Respiratory rate even and unlabored  Speaking in full sentences   Right chest port  Room air     ASSESSMENT AND PLAN:  No surgical plans    Nothing further from surgical perspective  Please reach out to Surgery KEENAN on [employer] (located on the intranet) for any questions or issues   Surgery signing off         ____________________________________     SAMINA Alfredo    DD: 3/28/2025  12:10 PM    "

## 2025-03-28 NOTE — CONSULTS
DATE OF SERVICE:  03/28/2025     INPATIENT ONCOLOGY CONSULTATION     REASON FOR CONSULTATION:  History of metastatic small cell lung cancer.     HISTORY OF PRESENT ILLNESS:  The patient is a very nice 80-year-old woman with   a history of tobacco abuse, arthritis, cataracts, COPD, breast cancer, as   well as metastatic stage IV small cell lung cancer.  Our office was contacted   by the patient and her daughter, and asked to consult in her care while she is   here in the hospital.     In regards to her oncology history, she had a right sided stage IIA breast   cancer diagnosed in 2011, treated with lumpectomy and sentinel node biopsy   (ER/VT positive, Ki-67 of 37%, HER2/boris negative). At that time, she refused   adjuvant chemo.  She was seen by Dr. Alcantar, who is her oncologist.    She did undergo a course of radiation.  She was   put on to anastrozole for 10 years, completing 10 years of treatment by   09/14/2021.    She has had no recurrence.     In regards to her small cell lung cancer, she was diagnosed initially with   stage IIB disease and treated with cisplatin and etoposide chemotherapy for   one cycle on 06/08/2020.  Because of side effects, she declined any further   chemotherapy.  She was treated with a full course of radiation.  She then underwent   observation.  Unfortunately, she had stage IV metastatic recurrence by May of   2024 including right lower lobe multifocal nodular tumors, as well as   mediastinal and right hilar lymphadenopathy and a malignant pleural effusion.    Palliative chemotherapy was recommended and she was started on carboplatin   and etoposide chemotherapy as well as atezolizumab immunotherapy, receiving   cycle #1 on 07/31/2024.  She completed 4 cycles of the combined therapy by   10/02/2024.  Her scan showed good response.  She was then changed to   atezolizumab immunotherapy single agent every 3 weeks starting with cycle #5.    She has continued on this every 3 weeks,  receiving cycle #11 on 02/27/2025.    She was due for cycle #12 on 03/20/2025, but was too weak to come in for   treatment.  She was going to be due for repeat staging scans as well.  Her   last PET CT scan was on 12/16/24, which showed that she was responding to   The treatment.  She had no mediastinal or right hilar lymphadenopathy (these had   resolved with treatment).  She was having a decreasing right pleural effusion,   which was characterized as small.  She also had decreasing right lower lobe   pulmonary nodules and consolidation.     She eventually came to the emergency room on 03/25/2025 because of failure to   thrive, poor oral intake for 1-2 weeks, and worsening back pain to the point   that she could not ambulate.  She has some degree of chronic back pain, but   this was becoming more significant.  In the emergency room, she was found to   have some electrolyte abnormalities including potassium of 3.2, BUN of 107   with a creatinine of 2.0.  She was hypotensive.  Her procalcitonin was   elevated at 10.9.  Her CPK was elevated at 560.  She underwent a CT scan of   the chest that showed a large right sided hydropneumothorax, with a large   right pleural effusion.  In the right lower lobe, she had multifocal nodular   metastatic lesions including lesions involving the pleura of the right lower   lobe.  She also had a mediastinal mass measuring 3.4 cm.     She was admitted to the hospital and started on IV fluids.  She had a surgical   consult with Dr. Marlon Momin.  She was not really complaining of shortness of   breath and seemed to be asymptomatic from the pleural effusion and small   pneumothorax standpoint.  He recommended no surgical intervention, but   considering a PleurX catheter by interventional radiology.     The case was discussed with interventional radiology.  They recommended   performing a thoracentesis first.  This was done on 03/27/2025 with removal of   1.1 liter of fluid.  The fluid is  showing malignant cells consistent with   malignant pleural effusion.     At this point, interventional radiology is planning a PleurX catheter   placement for ongoing drainage today on 03/28.  This will help facilitate home   care.     Because of her back pain, she underwent a CT scan of the thoracic and lumbar   spine, which shows advanced degenerative disk disease, but no evidence of   malignant involvement of the spine.  She did have a consult with palliative   care, who have adjusted her pain medications.  She says her pain is under   better control now, although it is still problematic.  She finally agreed to a   hospice referral.  She is supposed to be meeting with hospice, with plans for   consideration of home discharge for hospice/comfort care.  She does have a   niece, who lives out of state, who is now in state to help with her care.  Her   niece's name is Andrez.     The patient says her back pain is somewhat better now, although still   significant.  She was having some occasional mild diarrhea as an outpatient.    She was having stool and urinary incontinence.  She still denies any shortness   of breath.  She has some mild occasional cough.  She does have weakness and   fatigue.  She has poor appetite.  Otherwise, a complete review of systems per   the AMA and CMS criteria is negative.     PAST MEDICAL HISTORY:  1.  COPD.  2.  Hyperlipidemia.  3.  Cataracts.  4.  Arthritis.  5.  Chronic back pain with degenerative disk disease.  6.  Right-sided breast cancer.  7.  Metastatic stage IV small cell lung cancer.  8.  Malignant right sided pleural effusion.     PAST SURGICAL HISTORY:  1.  Right breast lumpectomy and sentinel node biopsy on 08/08/2011.  2.  Tonsillectomy.     ALLERGIES:  FLU VIRUS VACCINE.     MEDICATIONS HERE IN THE HOSPITAL:  1.  Decadron 4 mg b.i.d.  2.  Magnesium oxide 400 mg every day.  3.  Dulera 2 puffs b.i.d.  4.  MS Contin 15 mg q. 12 hours.  5.  Bowel regimen p.r.n.  6.  Spiriva 1  inhalation every day.  7.  Dilaudid 0.5 mg IV q. 3 hours p.r.n. pain.  8.  Oxycodone 10 mg p.o. q. 4 hours p.r.n. pain.  9.  Tylenol p.r.n.  10.  Melatonin p.r.n.  11.  Zofran p.r.n.     FAMILY HISTORY:  She has some history of cancer with her mother as well as 3   other sisters.     SOCIAL HISTORY:  She continues smoking.  She has been smoking for over 60   years.  She has 125-pack-year history.  She lives here locally alone.  She is   retired.     REVIEW OF SYSTEMS:  The review of systems is positive for illness listed   above.  Otherwise, complete review of systems per the AMA and CMS criteria is   negative.     PHYSICAL EXAMINATION:  VITAL SIGNS:  Her T-max is 98.0, blood pressure 128/78, pulse 87, respirations   16, satting 88% on room air.  GENERAL:  No acute distress, pleasant woman, appears her stated age.  HEENT:  NCAT.  Sclerae are anicteric.  Conjunctivae clear.  Oropharynx clear   without any erythema, exudate or discharge.  NECK:  Supple, nontender, no JVP, no carotid bruits, no thyromegaly.  CHEST:  Clear to auscultation and percussion bilaterally.  She has some   decreased breath sounds at the right lower lobe.  She has very mild end   expiration wheeze in the right lung.  No rales or rhonchi. She has no   respiratory distress.  CARDIOVASCULAR:  Regular rate and rhythm.  No murmurs, gallops or rubs.    Normal S1, S2.  ABDOMEN:  Soft, nontender, nondistended, no hepatosplenomegaly.  No guarding,   rebound, no masses.  LYMPH NODES:  No cervical, supraclavicular, infraclavicular, axillary or   inguinal lymphadenopathy.  EXTREMITIES:  No cyanosis, clubbing or edema.  Full range of motion, no joint   swelling.  SKIN:  No rashes, bruising, petechiae, ulcerations, nonhealing wounds.  NEUROLOGIC:  Her cranial nerves II-XII are intact.  She has intact sensation   to light touch throughout.  She moves all 4 extremities appropriately.  PSYCHIATRIC:  She is alert and oriented x3.  She has normal mood and  affect.     LABORATORY DATA:    Labs from 03/27/25: Sodium 144, potassium 3.5, chloride   117, CO2 of 15, BUN 41, creatinine 0.59, glucose 87, calcium 8.9, albumin 3.2,   phosphorus 1.6, magnesium 1.4.     Labs from 03/26:   WBC 8.1, hemoglobin 13.2, hematocrit 39.3%, platelets 244,   MCV 91.       ASSESSMENT AND PLAN:  The patient is a very nice 80-year-old woman with a   medical history as listed above including a history of right-sided stage IIA   breast cancer, treated in 2011 with surgical removal, and 10 years of hormonal   therapy with no evidence of recurrence.  She has a history of stage IV   metastatic small cell lung cancer with a stage IV disease diagnosed in   05/2024.  She underwent 4 cycles of combined chemo and immunotherapy as   described above.  She is now on atezolizumab immunotherapy every 3 weeks, and   completed 11 cycles by 02/27/2025.  She was due for cycle #12 on 03/20/25.  Prior   scans have shown evidence of disease response with the last PET CT scan in   12/16/24.     With this admission, she had a CT scan of the chest, which showed   unfortunately disease progression.  She now has mediastinal mass measuring 3.4   cm.  She has significant multifocal nodular metastatic disease in the right   lower lobe of the lung.  She has a enlarging, large right pleural effusion, which has been   shown to be malignant.  Unfortunately, her current therapy is not working.     At this point, she has a poor performance status and overall poor tolerability   of aggressive therapies.  I do not think she would tolerate any further   chemotherapy.  I think the best option in her case would be hospice and   comfort care.     This process is already been started here in the hospital.  She met with the   palliative care team.  They have recommended hospice.  The patient is now   agreeable to meet with hospice.  I talked to the patient about the goals of   hospice in terms of getting her home with good pain  control, and keeping her   comfortable through the end of life, so that she is not suffering.  To this   end, they are considering placing a PleurX drainage catheter in the right   pleural space, to facilitate ongoing drainage of pleural fluid at home, for   comfort.  I think this is a good idea.  This can be placed today, and   hopefully managed in the home setting by hospice with drainage as needed.  The   goal would be to get out of the hospital with a good medical plan place, with   hospice in place, so that she does not come back again.     Her pain is finally getting under better control.  She is on dexamethasone, oxycodone,   as well as MS Contin.  These medications can be adjusted to titrate to   comfort.  The advantage of being with hospice is these can be changed on a day   to day basis until a good pain relief is achieved.     At this point from an oncology standpoint, I really do not have anything else   to add.  I think she is at the end stages of her disease.  I do not think she   will tolerate any further treatment.  I think she is having progression.  All   goals of care should be focused on comfort and allowing her to be out of the   home in the hospice setting.     I called her niece, Andrez to discuss these issues as well.  She was in agreement with the plan as outlined above.  She was appreciative of the call.       At this point, I will sign off on the case.     Thank you very much referral of this very nice woman.        ______________________________  MD MIRANDA Mcgill/JOSE    DD:  03/28/2025 06:17  DT:  03/28/2025 08:56    Job#:  [id number]

## 2025-03-28 NOTE — PROGRESS NOTES
Palliative Care   Received phone call from patient's niece Andrez stating they would like to go with [facility] hospice.  VO LTE message sent to RN LINDA Miguel with update.     [email or web address].  Palliative Care Nurse Practitioner  [phone]

## 2025-03-28 NOTE — PROGRESS NOTES
Bedside report received from off going RN/tech: Ida, assumed care of patient.     Fall Risk Score: MODERATE RISK  Fall risk interventions in place: Place yellow fall risk ID band on patient, Provide patient/family education based on risk assessment, Educate patient/family to call staff for assistance when getting out of bed, Place fall precaution signage outside patient door, Utilize bed/chair fall alarm, and Bed alarm connected correctly  Bed type: Regular (Romario Score less than 17 interventions in place)  Patient on cardiac monitor: no  IVF/IV medications: Not Applicable   Oxygen: Room Air  Bedside sitter: Not Applicable   Isolation: Not applicable

## 2025-03-28 NOTE — THERAPY
Speech Language Therapy Contact Note    Patient Name: Emily Barnes  Age:  80 y.o., Sex:  female  Medical Record #: [id number]  Today's Date: 3/28/2025    ST to hold pending swallow eval, pt is NPO for chest tube in IR today per RN report

## 2025-03-29 LAB
PRELIMINARY RPT Q0601: NORMAL
RHODAMINE-AURAMINE STN SPEC: NORMAL

## 2025-03-29 PROCEDURE — A9270 NON-COVERED ITEM OR SERVICE: HCPCS | Performed by: STUDENT IN AN ORGANIZED HEALTH CARE EDUCATION/TRAINING PROGRAM

## 2025-03-29 PROCEDURE — 99233 SBSQ HOSP IP/OBS HIGH 50: CPT | Performed by: STUDENT IN AN ORGANIZED HEALTH CARE EDUCATION/TRAINING PROGRAM

## 2025-03-29 PROCEDURE — 700102 HCHG RX REV CODE 250 W/ 637 OVERRIDE(OP): Performed by: INTERNAL MEDICINE

## 2025-03-29 PROCEDURE — A9270 NON-COVERED ITEM OR SERVICE: HCPCS | Performed by: NURSE PRACTITIONER

## 2025-03-29 PROCEDURE — 700111 HCHG RX REV CODE 636 W/ 250 OVERRIDE (IP): Mod: JZ,TB | Performed by: NURSE PRACTITIONER

## 2025-03-29 PROCEDURE — A9270 NON-COVERED ITEM OR SERVICE: HCPCS | Performed by: INTERNAL MEDICINE

## 2025-03-29 PROCEDURE — 700102 HCHG RX REV CODE 250 W/ 637 OVERRIDE(OP): Performed by: NURSE PRACTITIONER

## 2025-03-29 PROCEDURE — 770004 HCHG ROOM/CARE - ONCOLOGY PRIVATE *

## 2025-03-29 PROCEDURE — 700111 HCHG RX REV CODE 636 W/ 250 OVERRIDE (IP): Performed by: STUDENT IN AN ORGANIZED HEALTH CARE EDUCATION/TRAINING PROGRAM

## 2025-03-29 PROCEDURE — 700102 HCHG RX REV CODE 250 W/ 637 OVERRIDE(OP): Performed by: STUDENT IN AN ORGANIZED HEALTH CARE EDUCATION/TRAINING PROGRAM

## 2025-03-29 PROCEDURE — 92610 EVALUATE SWALLOWING FUNCTION: CPT

## 2025-03-29 RX ORDER — DOCUSATE SODIUM 100 MG/1
100 CAPSULE, LIQUID FILLED ORAL EVERY 12 HOURS
Status: DISCONTINUED | OUTPATIENT
Start: 2025-03-29 | End: 2025-04-01 | Stop reason: HOSPADM

## 2025-03-29 RX ORDER — BISACODYL 10 MG
10 SUPPOSITORY, RECTAL RECTAL
Status: DISCONTINUED | OUTPATIENT
Start: 2025-03-29 | End: 2025-04-01 | Stop reason: HOSPADM

## 2025-03-29 RX ORDER — MORPHINE SULFATE 100 MG/5ML
10 SOLUTION ORAL
Refills: 0 | Status: DISCONTINUED | OUTPATIENT
Start: 2025-03-29 | End: 2025-04-01 | Stop reason: HOSPADM

## 2025-03-29 RX ORDER — LORAZEPAM 2 MG/ML
1 INJECTION INTRAMUSCULAR
Status: DISCONTINUED | OUTPATIENT
Start: 2025-03-29 | End: 2025-04-01 | Stop reason: HOSPADM

## 2025-03-29 RX ORDER — LORAZEPAM 2 MG/ML
1 CONCENTRATE ORAL
Status: DISCONTINUED | OUTPATIENT
Start: 2025-03-29 | End: 2025-04-01 | Stop reason: HOSPADM

## 2025-03-29 RX ORDER — ATROPINE SULFATE 10 MG/ML
2 SOLUTION/ DROPS OPHTHALMIC EVERY 4 HOURS PRN
Status: DISCONTINUED | OUTPATIENT
Start: 2025-03-29 | End: 2025-04-01 | Stop reason: HOSPADM

## 2025-03-29 RX ORDER — LACTULOSE 10 G/15ML
10 SOLUTION ORAL
Status: DISCONTINUED | OUTPATIENT
Start: 2025-03-29 | End: 2025-04-01 | Stop reason: HOSPADM

## 2025-03-29 RX ORDER — TRAZODONE HYDROCHLORIDE 100 MG/1
50 TABLET ORAL
Status: DISCONTINUED | OUTPATIENT
Start: 2025-03-29 | End: 2025-04-01 | Stop reason: HOSPADM

## 2025-03-29 RX ORDER — MAGNESIUM SULFATE HEPTAHYDRATE 40 MG/ML
2 INJECTION, SOLUTION INTRAVENOUS ONCE
Status: DISCONTINUED | OUTPATIENT
Start: 2025-03-29 | End: 2025-03-29

## 2025-03-29 RX ADMIN — POLYETHYLENE GLYCOL 3350 1 PACKET: 17 POWDER, FOR SOLUTION ORAL at 17:34

## 2025-03-29 RX ADMIN — LORAZEPAM 1 MG: 2 INJECTION INTRAMUSCULAR; INTRAVENOUS at 21:05

## 2025-03-29 RX ADMIN — DOCUSATE SODIUM 100 MG: 100 CAPSULE, LIQUID FILLED ORAL at 17:35

## 2025-03-29 RX ADMIN — DIBASIC SODIUM PHOSPHATE, MONOBASIC POTASSIUM PHOSPHATE AND MONOBASIC SODIUM PHOSPHATE 250 MG: 852; 155; 130 TABLET ORAL at 11:59

## 2025-03-29 RX ADMIN — OXYCODONE HYDROCHLORIDE 10 MG: 10 TABLET ORAL at 10:15

## 2025-03-29 RX ADMIN — SODIUM BICARBONATE 650 MG: 650 TABLET ORAL at 08:39

## 2025-03-29 RX ADMIN — MORPHINE SULFATE 15 MG: 15 TABLET, FILM COATED, EXTENDED RELEASE ORAL at 05:15

## 2025-03-29 RX ADMIN — OXYCODONE HYDROCHLORIDE 10 MG: 10 TABLET ORAL at 05:14

## 2025-03-29 RX ADMIN — LORAZEPAM 1 MG: 2 INJECTION INTRAMUSCULAR; INTRAVENOUS at 15:07

## 2025-03-29 RX ADMIN — SENNOSIDES AND DOCUSATE SODIUM 2 TABLET: 50; 8.6 TABLET ORAL at 17:35

## 2025-03-29 RX ADMIN — DIBASIC SODIUM PHOSPHATE, MONOBASIC POTASSIUM PHOSPHATE AND MONOBASIC SODIUM PHOSPHATE 250 MG: 852; 155; 130 TABLET ORAL at 05:15

## 2025-03-29 RX ADMIN — DEXAMETHASONE 4 MG: 4 TABLET ORAL at 14:26

## 2025-03-29 RX ADMIN — HYDROMORPHONE HYDROCHLORIDE 0.5 MG: 1 INJECTION, SOLUTION INTRAMUSCULAR; INTRAVENOUS; SUBCUTANEOUS at 08:38

## 2025-03-29 RX ADMIN — DEXAMETHASONE 4 MG: 4 TABLET ORAL at 05:15

## 2025-03-29 RX ADMIN — HYDROMORPHONE HYDROCHLORIDE 0.5 MG: 1 INJECTION, SOLUTION INTRAMUSCULAR; INTRAVENOUS; SUBCUTANEOUS at 03:03

## 2025-03-29 RX ADMIN — Medication 400 MG: at 05:15

## 2025-03-29 RX ADMIN — HYDROMORPHONE HYDROCHLORIDE 0.5 MG: 1 INJECTION, SOLUTION INTRAMUSCULAR; INTRAVENOUS; SUBCUTANEOUS at 15:06

## 2025-03-29 RX ADMIN — POLYETHYLENE GLYCOL 3350 1 PACKET: 17 POWDER, FOR SOLUTION ORAL at 05:14

## 2025-03-29 RX ADMIN — TIOTROPIUM BROMIDE INHALATION SPRAY 5 MCG: 3.12 SPRAY, METERED RESPIRATORY (INHALATION) at 05:11

## 2025-03-29 RX ADMIN — MORPHINE SULFATE 15 MG: 15 TABLET, FILM COATED, EXTENDED RELEASE ORAL at 17:34

## 2025-03-29 RX ADMIN — MOMETASONE FUROATE AND FORMOTEROL FUMARATE DIHYDRATE 2 PUFF: 100; 5 AEROSOL RESPIRATORY (INHALATION) at 05:10

## 2025-03-29 RX ADMIN — OXYCODONE HYDROCHLORIDE 10 MG: 10 TABLET ORAL at 01:05

## 2025-03-29 RX ADMIN — HYDROMORPHONE HYDROCHLORIDE 0.5 MG: 1 INJECTION, SOLUTION INTRAMUSCULAR; INTRAVENOUS; SUBCUTANEOUS at 21:05

## 2025-03-29 ASSESSMENT — PAIN DESCRIPTION - PAIN TYPE
TYPE: ACUTE PAIN;CHRONIC PAIN
TYPE: ACUTE PAIN
TYPE: ACUTE PAIN
TYPE: ACUTE PAIN;CHRONIC PAIN
TYPE: ACUTE PAIN
TYPE: ACUTE PAIN
TYPE: ACUTE PAIN;CHRONIC PAIN
TYPE: ACUTE PAIN

## 2025-03-29 NOTE — PROGRESS NOTES
Hospital Medicine Daily Progress Note    Date of Service  3/29/2025    Chief Complaint  Emily Barnes is a 80 y.o. female admitted 3/25/2025 with small cell lung cancer, on comfort care.  She is awaiting Pleurx placement with interventional radiology 3/21.    Hospital Course  The patient was admitted for acute hypoxemic respiratory failure in the setting of stage IV small cell lung cancer. She underwent imaging that revealed a large right hydropneumothorax.  Patient underwent thoracentesis and had a questionable right-sided pneumothorax thereafter.  Repeat imaging reveals no evidence of right sided pneumothorax.  The patient's goals of care were discussed between the patient and the palliative care.  She reported to palliative care that she does not want her cancer treated.  She is agreeable to hospice at home.  The patient is agreeable to having a Pleurx placed prior to discharge home on hospice.  The patient agreeable to comfort care as of 3/29.    Interval Problem Update  Patient seen and evaluated at bedside  Pertinent labs and imaging reviewed  Afebrile, hemodynamically stable, no oxygen requirements  Voiding, stooling, tolerating oral intake well  Previous bowel movement 3/27  The patient was agreeable to starting comfort care today  The patient's favorite television show is [employer] but she will watch other criminal investigation shows    I have discussed this patient's plan of care and discharge plan at IDT rounds today with Case Management, Nursing, Nursing leadership, and other members of the IDT team.    Consultants/Specialty  None    Code Status  Comfort Care/DNR    Disposition  The patient is not medically cleared for discharge to home or a post-acute facility.      I have placed the appropriate orders for post-discharge needs.    Review of Systems  Review of Systems   All other systems reviewed and are negative.       Physical Exam  Temp:  [36.1 °C (96.9 °F)-37 °C (98.6 °F)] 37 °C (98.6  °F)  Pulse:  [70-81] 70  Resp:  [16-18] 18  BP: (137-148)/(66-77) 137/66  SpO2:  [88 %-91 %] 90 %    Physical Exam  Constitutional:       General: She is not in acute distress.     Appearance: Normal appearance. She is normal weight.   HENT:      Head: Normocephalic and atraumatic.      Nose: Nose normal.   Eyes:      Extraocular Movements: Extraocular movements intact.   Pulmonary:      Effort: Pulmonary effort is normal. No respiratory distress.   Musculoskeletal:      Cervical back: Normal range of motion.      Right lower leg: No edema.      Left lower leg: No edema.   Skin:     General: Skin is dry.   Neurological:      General: No focal deficit present.      Mental Status: She is alert and oriented to person, place, and time.   Psychiatric:         Mood and Affect: Mood normal.         Behavior: Behavior normal.         Thought Content: Thought content normal.         Fluids    Intake/Output Summary (Last 24 hours) at 3/29/2025 1637  Last data filed at 3/29/2025 0010  Gross per 24 hour   Intake --   Output 120 ml   Net -120 ml        Laboratory  Recent Labs     03/28/25  0518   WBC 6.1   RBC 3.75*   HEMOGLOBIN 11.9*   HEMATOCRIT 33.6*   MCV 89.6   MCH 31.7   MCHC 35.4   RDW 52.7*   PLATELETCT 224   MPV 10.3     Recent Labs     03/27/25  0520 03/28/25  0518   SODIUM 144 144   POTASSIUM 3.5* 3.9   CHLORIDE 117* 115*   CO2 15* 19*   GLUCOSE 87 121*   BUN 41* 16   CREATININE 0.59 0.40*   CALCIUM 8.9 8.5     Recent Labs     03/28/25  0518   INR 1.49*               Imaging  DX-CHEST-LIMITED (1 VIEW)   Final Result      1.  Diffuse opacification of the right lower lobe consistent with pneumonitis and/or pleural effusion.      2.  Blunting of right costophrenic angle consistent with pleural effusion.      DX-CHEST-PORTABLE (1 VIEW)   Final Result      No evidence of right-sided pneumothorax status post thoracentesis.      DX-CHEST-PORTABLE (1 VIEW)   Final Result      1.  Questionable small right pneumothorax.   2.   Small right pleural effusion the adjacent airspace disease.   3.  Cardiomegaly.      Dr. Reddy discussed these findings with Brianna Mcbride at 3/27/2025 1:20 PM      US-THORACENTESIS PUNCTURE RIGHT   Final Result      1. Ultrasound guided right sided diagnostic and therapeutic thoracentesis.      2. 1,100 mL of fluid withdrawn.      DX-CHEST-LIMITED (1 VIEW)   Final Result      1.  Increased moderate right pleural effusion with associated compressive atelectasis and/or consolidation.   2.  Mild diffuse interstitial prominence which may related to edema or infection.   3.  Stable enlargement of the cardiomediastinal silhouette.      DX-CHEST-LIMITED (1 VIEW)   Final Result      1.  Small right hydropneumothorax.   2.  Right lung interstitial and airspace opacities, greater inferiorly.      Findings conveyed to Dr. Sosa at 3/26/2025 9:19 AM via Voalte messaging.      CT-LSPINE W/O PLUS RECONS   Final Result   Impression:      1. Osteopenia and advanced degenerative disc disease at L4-L5.   2. No definite fracture or malalignment.      CT-TSPINE W/O PLUS RECONS   Final Result      Multilevel degenerative disease without acute fracture or malalignment.      CT-CHEST,ABDOMEN,PELVIS W/O   Final Result      1.  Large right hydropneumothorax.      2.  Multifocal nodular masses in the right lower lobe. Additionally there are pleural-based masses posteriorly in the base of the right hemithorax. These findings are suspicious for metastatic disease.      3.  3.4 cm anterior mediastinal mass suspicious for metastatic disease.      4.  Nonobstructing left upper pole nephrolithiasis.      5.  4.2 cm infrarenal aortic aneurysm.      6.  These findings were discussed with DUARTE FRY at 6:00 PM 3/25/2025      IR-CONSULT AND TREAT    (Results Pending)        Assessment/Plan  * Malignant pleural effusion  Assessment & Plan  Recurrent pleural effusion on the right side  Cytology last time was positive for malignancy  cells  Thoracentesis was done on 3/27  IR was consulted for Pleurx catheter  Appreciate general surgery recommendations      Hypomagnesemia  Assessment & Plan  Replace IV    Cancer associated pain  Assessment & Plan  Patient is always on right back pain  Modal pain management available  On comfort care    Infrarenal abdominal aortic aneurysm (AAA) without rupture (HCC)  Assessment & Plan  As seen on CT  Follow-up as outpatient  \Encourage the patient to quit smoking    Chronic right-sided low back pain without sciatica  Assessment & Plan  Acute on chronic  Pain control  Comfort care while awaiting Pleurx placement and discharging home on hospice    Hypokalemia  Assessment & Plan  Likely related to low oral intake  Labs daily and replace orally    Mixed hyperlipidemia  Assessment & Plan  Resume home medication Lipitor    Primary hypertension  Assessment & Plan  Holding home med lisinopril in view of ongoing JUDE    ACP (advance care planning)  Assessment & Plan  3/25  Patient was alert and oriented x 4, plan of care was discussed in detail with the patient and her niece on the phone, answered all her questions, discussed the advanced cancer, discussed all treatment options including hospice and comfort care, patient dictated that she wants to be DNR/DNI however refused hospice at this time, have more conversation tomorrow.  Niece [place] her.  Time 30-minute.    3/27   again the plan of care was discussed with the patient and palliative at bedside, answered all her questions, the patient agreed for hospice referral, DNR/DNI, patient wants to pain medication, she states she is always in pain, also palliative team discussed the case with the niece the power of  and agreed for hospice referral, time 25 minutes    3/29  Patient agreeable to comfort care    Hydropneumothorax  Assessment & Plan  S/p thoracentesis, on room air  Comfort care while awaiting Pleurx placement and  discharging home on hospice    Hypocalcemia  Assessment & Plan  Check vitamin D and magnesium  Replaced IV and orally  Continue monitoring telemetry    JUDE (acute kidney injury) (HCC)- (present on admission)  Assessment & Plan  Resolved  No longer monitoring in the setting of comfort care    Smoking- (present on admission)  Assessment & Plan  History of    COPD with asthma (HCC)- (present on admission)  Assessment & Plan  Significant history of smoking   Comfort care while awaiting Pleurx placement and discharging home on hospice      Small cell lung cancer (HCC)- (present on admission)  Assessment & Plan  Recurrent stage IV small cell lung cancer   Patient was on palliative chemo however now has been off of chemo from past several months.  Patient wants to be DNR/DNI   Start with morphine 15 mg twice daily  Hospice referral  Comfort care while awaiting Pleurx placement and discharging home on hospice        Breast cancer - (present on admission)  Assessment & Plan  H/o in 2011  s/p partial mastectomy in 2011  As per pt she had refused chemotherapy then but was cancer free with repeat tests.  Last mammography in May 2024 which did not show any obvious malignancy  Comfort care  Patient to be discharged on hospice following the placement of Pleurx           VTE prophylaxis:    pharmacologic prophylaxis contraindicated due to Comfort care      I have performed a physical exam and reviewed and updated ROS and Plan today (3/29/2025). In review of yesterday's note (3/28/2025), there are no changes except as documented above.    I provided a total of 52 minutes addressing the patient's medical and discharge needs while in the acute care setting. This patient's care required the review of medical records and diagnostic studies, direct face-to-face time with the patient and/or family, documentation, and communication with my interdisciplinary team of RNs, pharmacists, and .

## 2025-03-29 NOTE — CARE PLAN
The patient is Stable - Low risk of patient condition declining or worsening    Shift Goals  Clinical Goals: Patient will remain comfortable and rest throughout shift, and verbalize tolerable pain level  Patient Goals: go home  Family Goals: emeterio    Progress made toward(s) clinical / shift goals:    Problem: Knowledge Deficit - Standard  Goal: Patient and family/care givers will demonstrate understanding of plan of care, disease process/condition, diagnostic tests and medications  Outcome: Progressing  Note: Pt is AO4. Call light within reach. Educated and understand POC. All questions answered at this time.     Problem: Fall Risk  Goal: Patient will remain free from falls  Outcome: Progressing  Note: Fall prevention measures in place, bed alarm on and connected correctly, call light within reach, hourly rounding, Education provided on fall prevention. Pt instructed to use call light prior to exiting the bed, educated on use of bed alarms, and risks and complications related to falls.       Patient is not progressing towards the following goals:      Problem: Pain - Standard  Goal: Alleviation of pain or a reduction in pain to the patient’s comfort goal  Outcome: Not Progressing

## 2025-03-29 NOTE — CARE PLAN
The patient is Watcher - Medium risk of patient condition declining or worsening    Shift Goals  Clinical Goals: Patient will remain comfortable and rest throughout shift, and verbalize tolerable pain level  Patient Goals: go home  Family Goals: emeterio    Progress made toward(s) clinical / shift goals:  Patient resting comfortably and reports pain controlled with medication. Patient has been updated on the plan of care and has no questions at this time.

## 2025-03-29 NOTE — THERAPY
"Speech Language Pathology   Clinical Swallow Evaluation     Patient Name: Emily Barnes  AGE:  80 y.o., SEX:  female  Medical Record #: [id number]  Date of Service: 3/29/2025      History of Present Illness  81 y/o F admit 3/25 with lower abdominal pain and back pain. 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  Large right hydropneumothorax. 2.  Multifocal nodular masses in the right lower lobe. Additionally there are pleural-based masses posteriorly in the base of the right hemithorax. These findings are suspicious for metastatic disease. 3.  3.4 cm anterior mediastinal mass suspicious for metastatic disease. 4.  Nonobstructing left upper pole nephrolithiasis. 5.  4.2 cm infrarenal aortic aneurysm\"       General Information:  Vitals  O2 Delivery Device: None - Room Air  Level of Consciousness: Awake, Lethargic  Patient Behaviors: Flat Affect, Lethargic, Withdrawn  Orientation: Oriented x 4  Follows Directives: Yes      Prior Living Situation & Level of Function:  Housing / Facility: 2 Story Apartment / Condo  Lives with - Patient's Self Care Capacity: Alone and Able to Care For Self  Comments: Patient reports shaq will be staying with her upon discharge.  Communication: WFL  Swallowing: Patient reports intermittent globus sensation with PO intake.       Oral Mechanism Evaluation:  Dentition: Edentulous   Facial Symmetry: Equal  Facial Sensation: Equal     Labial Observations: WFL   Lingual Observations: Midline               Laryngeal Function:  Secretion Management: Adequate  Voice Quality: WFL  Cough: Perceptually WNL         Subjective  RN cleared patient for evaluation. " Patient received awake, fully cooperated with SLP tasks. Per RN, patient will be discharging home with hospice on Monday.       Assessment  Current Method of Nutrition: Oral diet (RG7/TN0)  Positioning: Bhatti's (60-90 degrees)  Bolus Administration: Patient    O2 Delivery Device: None - Room Air  Factor(s) Affecting Performance: None  Tracheostomy : No          Swallowing Trials:  Swallowing Trials  Thin Liquid (TN0): WFL  Liquidised (LQ3): WFL  Regular (RG7): WFL      Comments: Patient able to self-feed without difficulty. Adequate oral bolus acceptance/containment. Adequate bite with prolonged but functional mastication of dry solids. No cough/throat clear appreciated with PO intake. Vocal quality remained clear. Patient c/o globus sensation x1 with PO of thins.       Clinical Impressions  Patient presents without clinical signs of oropharyngeal dysphagia; however, suspect esophageal dysphagia given c/o globus sensation with PO of thins x1 and patient reported occasional globus sensation with PO intake. Recommend regular/thins diet with strict adherence to reflux precautions. Per RN, patient discharging home with hospice on Monday. Service will not actively follow, please re-consult with any change in status.        Recommendations  Diet Consistency: Regular solids, thin liquids  Instrumentation: None indicated at this time  Medication: As tolerated  Supervision: Distant supervision - check on patient 2-3 times per meal  Positioning: Fully upright and midline during oral intake, Meals sitting upright in a chair, as tolerated, Remain upright for 30-45 minutes after oral intake  Risk Management : Small bites/sips, Alternate bites and sips, Slow rate of intake, Physical mobility, as tolerated  Oral Care: Pre- and post-meals         SLP Treatment Plan  Treatment Plan: None Indicated  SLP Frequency: N/A - Evaluation Only  Estimated Duration: N/A - Evaluation Only      Anticipated Discharge Needs  Discharge  Recommendations: Anticipate that the patient will have no further speech therapy needs after discharge from the hospital   Therapy Recommendations Upon DC: Patient / Family / Caregiver Education                  Chloe Liu, SLP

## 2025-03-30 LAB
BACTERIA BLD CULT: NORMAL
BACTERIA BLD CULT: NORMAL
BACTERIA FLD AEROBE CULT: NORMAL
GRAM STN SPEC: NORMAL
SIGNIFICANT IND 70042: NORMAL
SITE SITE: NORMAL
SOURCE SOURCE: NORMAL

## 2025-03-30 PROCEDURE — A9270 NON-COVERED ITEM OR SERVICE: HCPCS | Performed by: STUDENT IN AN ORGANIZED HEALTH CARE EDUCATION/TRAINING PROGRAM

## 2025-03-30 PROCEDURE — 770004 HCHG ROOM/CARE - ONCOLOGY PRIVATE *

## 2025-03-30 PROCEDURE — 700111 HCHG RX REV CODE 636 W/ 250 OVERRIDE (IP): Performed by: STUDENT IN AN ORGANIZED HEALTH CARE EDUCATION/TRAINING PROGRAM

## 2025-03-30 PROCEDURE — A9270 NON-COVERED ITEM OR SERVICE: HCPCS | Performed by: INTERNAL MEDICINE

## 2025-03-30 PROCEDURE — 99232 SBSQ HOSP IP/OBS MODERATE 35: CPT | Performed by: STUDENT IN AN ORGANIZED HEALTH CARE EDUCATION/TRAINING PROGRAM

## 2025-03-30 PROCEDURE — 700102 HCHG RX REV CODE 250 W/ 637 OVERRIDE(OP): Performed by: NURSE PRACTITIONER

## 2025-03-30 PROCEDURE — 700102 HCHG RX REV CODE 250 W/ 637 OVERRIDE(OP): Performed by: STUDENT IN AN ORGANIZED HEALTH CARE EDUCATION/TRAINING PROGRAM

## 2025-03-30 PROCEDURE — 700102 HCHG RX REV CODE 250 W/ 637 OVERRIDE(OP): Performed by: INTERNAL MEDICINE

## 2025-03-30 PROCEDURE — A9270 NON-COVERED ITEM OR SERVICE: HCPCS | Performed by: NURSE PRACTITIONER

## 2025-03-30 PROCEDURE — 700111 HCHG RX REV CODE 636 W/ 250 OVERRIDE (IP): Mod: JZ,TB | Performed by: NURSE PRACTITIONER

## 2025-03-30 RX ADMIN — HYDROMORPHONE HYDROCHLORIDE 0.5 MG: 1 INJECTION, SOLUTION INTRAMUSCULAR; INTRAVENOUS; SUBCUTANEOUS at 12:51

## 2025-03-30 RX ADMIN — MORPHINE SULFATE 15 MG: 15 TABLET, FILM COATED, EXTENDED RELEASE ORAL at 05:43

## 2025-03-30 RX ADMIN — SENNOSIDES AND DOCUSATE SODIUM 2 TABLET: 50; 8.6 TABLET ORAL at 17:10

## 2025-03-30 RX ADMIN — OXYCODONE HYDROCHLORIDE 10 MG: 10 TABLET ORAL at 13:58

## 2025-03-30 RX ADMIN — OXYCODONE HYDROCHLORIDE 10 MG: 10 TABLET ORAL at 22:25

## 2025-03-30 RX ADMIN — MORPHINE SULFATE 10 MG: 100 SOLUTION ORAL at 18:13

## 2025-03-30 RX ADMIN — HYDROMORPHONE HYDROCHLORIDE 0.5 MG: 1 INJECTION, SOLUTION INTRAMUSCULAR; INTRAVENOUS; SUBCUTANEOUS at 16:30

## 2025-03-30 RX ADMIN — Medication 5 MG: at 20:28

## 2025-03-30 RX ADMIN — DOCUSATE SODIUM 100 MG: 100 CAPSULE, LIQUID FILLED ORAL at 17:10

## 2025-03-30 RX ADMIN — DEXAMETHASONE 4 MG: 4 TABLET ORAL at 05:43

## 2025-03-30 RX ADMIN — MORPHINE SULFATE 15 MG: 15 TABLET, FILM COATED, EXTENDED RELEASE ORAL at 17:10

## 2025-03-30 RX ADMIN — TRAZODONE HYDROCHLORIDE 50 MG: 100 TABLET ORAL at 20:28

## 2025-03-30 RX ADMIN — DEXAMETHASONE 4 MG: 4 TABLET ORAL at 13:58

## 2025-03-30 RX ADMIN — POLYETHYLENE GLYCOL 3350 1 PACKET: 17 POWDER, FOR SOLUTION ORAL at 05:43

## 2025-03-30 RX ADMIN — LORAZEPAM 1 MG: 2 INJECTION INTRAMUSCULAR; INTRAVENOUS at 01:37

## 2025-03-30 RX ADMIN — POLYETHYLENE GLYCOL 3350 1 PACKET: 17 POWDER, FOR SOLUTION ORAL at 17:10

## 2025-03-30 RX ADMIN — DOCUSATE SODIUM 100 MG: 100 CAPSULE, LIQUID FILLED ORAL at 05:43

## 2025-03-30 RX ADMIN — HYDROMORPHONE HYDROCHLORIDE 0.5 MG: 1 INJECTION, SOLUTION INTRAMUSCULAR; INTRAVENOUS; SUBCUTANEOUS at 01:37

## 2025-03-30 ASSESSMENT — PAIN DESCRIPTION - PAIN TYPE
TYPE: ACUTE PAIN

## 2025-03-30 NOTE — DISCHARGE PLANNING
Received Choice Form at: 2271  Agency/Facility Name:  [facility]   Sent Referral per Choice Form at: 9579

## 2025-03-30 NOTE — CARE PLAN
The patient is Watcher - Medium risk of patient condition declining or worsening    Shift Goals  Clinical Goals: Patient will remain comfortable and rest throughout shift, and verbalize tolerable pain level  Patient Goals: go home  Family Goals: emeterio    Progress made toward(s) clinical / shift goals:    Problem: Pain - Standard  Goal: Alleviation of pain or a reduction in pain to the patient’s comfort goal  Outcome: Progressing     Problem: Fall Risk  Goal: Patient will remain free from falls  Outcome: Progressing       Patient is not progressing towards the following goals:

## 2025-03-30 NOTE — DISCHARGE PLANNING
1345: RN CM called patient's niece, Andrez Restrepo: [phone], who provided verbal choice for [facility]. Choice form faxed to [facility] and referral sent.

## 2025-03-30 NOTE — CARE PLAN
The patient is Watcher - Medium risk of patient condition declining or worsening    Shift Goals  Clinical Goals: Remain comfortable through the shift  Patient Goals: Control pain by the end of the shift  Family Goals: Not present    Progress made toward(s) clinical / shift goals:  Patient reported a decrease in pain after medication and repositioning. Patient has rested comfortably and quietly through the shift.

## 2025-03-31 ENCOUNTER — PHARMACY VISIT (OUTPATIENT)
Dept: PHARMACY | Facility: MEDICAL CENTER | Age: 80
End: 2025-03-31
Payer: COMMERCIAL

## 2025-03-31 ENCOUNTER — HOME CARE VISIT (OUTPATIENT)
Dept: HOSPICE | Facility: HOSPICE | Age: 80
End: 2025-03-31
Payer: COMMERCIAL

## 2025-03-31 VITALS
OXYGEN SATURATION: 91 % | HEIGHT: 63 IN | WEIGHT: 145.94 LBS | RESPIRATION RATE: 16 BRPM | SYSTOLIC BLOOD PRESSURE: 122 MMHG | BODY MASS INDEX: 25.86 KG/M2 | DIASTOLIC BLOOD PRESSURE: 69 MMHG | TEMPERATURE: 96.9 F | HEART RATE: 80 BPM

## 2025-03-31 DIAGNOSIS — Z51.5 HOSPICE CARE: ICD-10-CM

## 2025-03-31 DIAGNOSIS — C34.90 SMALL CELL CARCINOMA OF LUNG, UNSPECIFIED LATERALITY, UNSPECIFIED PART OF LUNG (HCC): Primary | ICD-10-CM

## 2025-03-31 PROCEDURE — RXMED WILLOW AMBULATORY MEDICATION CHARGE: Performed by: REHABILITATION PRACTITIONER

## 2025-03-31 PROCEDURE — 700102 HCHG RX REV CODE 250 W/ 637 OVERRIDE(OP): Performed by: INTERNAL MEDICINE

## 2025-03-31 PROCEDURE — 770004 HCHG ROOM/CARE - ONCOLOGY PRIVATE *

## 2025-03-31 PROCEDURE — 700102 HCHG RX REV CODE 250 W/ 637 OVERRIDE(OP): Performed by: NURSE PRACTITIONER

## 2025-03-31 PROCEDURE — 0W9930Z DRAINAGE OF RIGHT PLEURAL CAVITY WITH DRAINAGE DEVICE, PERCUTANEOUS APPROACH: ICD-10-PCS | Performed by: RADIOLOGY

## 2025-03-31 PROCEDURE — 700101 HCHG RX REV CODE 250

## 2025-03-31 PROCEDURE — 700111 HCHG RX REV CODE 636 W/ 250 OVERRIDE (IP): Mod: JZ,TB | Performed by: NURSE PRACTITIONER

## 2025-03-31 PROCEDURE — A9270 NON-COVERED ITEM OR SERVICE: HCPCS | Performed by: NURSE PRACTITIONER

## 2025-03-31 PROCEDURE — A9270 NON-COVERED ITEM OR SERVICE: HCPCS | Performed by: STUDENT IN AN ORGANIZED HEALTH CARE EDUCATION/TRAINING PROGRAM

## 2025-03-31 PROCEDURE — 700102 HCHG RX REV CODE 250 W/ 637 OVERRIDE(OP): Performed by: STUDENT IN AN ORGANIZED HEALTH CARE EDUCATION/TRAINING PROGRAM

## 2025-03-31 PROCEDURE — 99232 SBSQ HOSP IP/OBS MODERATE 35: CPT | Performed by: STUDENT IN AN ORGANIZED HEALTH CARE EDUCATION/TRAINING PROGRAM

## 2025-03-31 PROCEDURE — 700105 HCHG RX REV CODE 258: Performed by: RADIOLOGY

## 2025-03-31 PROCEDURE — 700111 HCHG RX REV CODE 636 W/ 250 OVERRIDE (IP): Mod: JZ

## 2025-03-31 PROCEDURE — A9270 NON-COVERED ITEM OR SERVICE: HCPCS | Performed by: INTERNAL MEDICINE

## 2025-03-31 PROCEDURE — 700111 HCHG RX REV CODE 636 W/ 250 OVERRIDE (IP): Mod: JZ | Performed by: RADIOLOGY

## 2025-03-31 PROCEDURE — C1769 GUIDE WIRE: HCPCS

## 2025-03-31 RX ORDER — TRAZODONE HYDROCHLORIDE 50 MG/1
50 TABLET ORAL
Qty: 30 TABLET | Refills: 3 | Status: CANCELLED | OUTPATIENT
Start: 2025-03-31

## 2025-03-31 RX ORDER — CEFAZOLIN SODIUM 1 G/3ML
INJECTION, POWDER, FOR SOLUTION INTRAMUSCULAR; INTRAVENOUS
Status: DISPENSED
Start: 2025-03-31 | End: 2025-03-31

## 2025-03-31 RX ORDER — MIDAZOLAM HYDROCHLORIDE 1 MG/ML
.5-2 INJECTION INTRAMUSCULAR; INTRAVENOUS PRN
Status: ACTIVE | OUTPATIENT
Start: 2025-03-31 | End: 2025-03-31

## 2025-03-31 RX ORDER — TRAZODONE HYDROCHLORIDE 50 MG/1
50 TABLET ORAL NIGHTLY
Qty: 30 TABLET | Refills: 11 | Status: SHIPPED | OUTPATIENT
Start: 2025-03-31 | End: 2026-03-31

## 2025-03-31 RX ORDER — ACETAMINOPHEN 500 MG
1000 TABLET ORAL EVERY 6 HOURS PRN
Qty: 30 TABLET | Refills: 10 | Status: SHIPPED | OUTPATIENT
Start: 2025-03-31 | End: 2026-03-31

## 2025-03-31 RX ORDER — ACETAMINOPHEN 650 MG/1
650 SUPPOSITORY RECTAL EVERY 6 HOURS PRN
Qty: 5 SUPPOSITORY | Refills: 10 | Status: SHIPPED | OUTPATIENT
Start: 2025-03-31

## 2025-03-31 RX ORDER — ONDANSETRON 4 MG/1
4 TABLET, ORALLY DISINTEGRATING ORAL EVERY 6 HOURS PRN
Qty: 15 TABLET | Refills: 10 | Status: SHIPPED | OUTPATIENT
Start: 2025-03-31 | End: 2026-03-31

## 2025-03-31 RX ORDER — LORAZEPAM 2 MG/ML
1-2 CONCENTRATE ORAL
Qty: 360 ML | Refills: 0 | Status: SHIPPED | OUTPATIENT
Start: 2025-03-31 | End: 2026-03-31

## 2025-03-31 RX ORDER — AMOXICILLIN 250 MG
2 CAPSULE ORAL EVERY EVENING
Qty: 60 TABLET | Refills: 11 | Status: SHIPPED | OUTPATIENT
Start: 2025-03-31 | End: 2026-03-31

## 2025-03-31 RX ORDER — SODIUM CHLORIDE 9 MG/ML
500 INJECTION, SOLUTION INTRAVENOUS
Status: ACTIVE | OUTPATIENT
Start: 2025-03-31 | End: 2025-03-31

## 2025-03-31 RX ORDER — LIDOCAINE HYDROCHLORIDE AND EPINEPHRINE 10; 10 MG/ML; UG/ML
INJECTION, SOLUTION INFILTRATION; PERINEURAL
Status: COMPLETED
Start: 2025-03-31 | End: 2025-03-31

## 2025-03-31 RX ORDER — MIDAZOLAM HYDROCHLORIDE 1 MG/ML
INJECTION INTRAMUSCULAR; INTRAVENOUS
Status: COMPLETED
Start: 2025-03-31 | End: 2025-03-31

## 2025-03-31 RX ORDER — SENNA AND DOCUSATE SODIUM 50; 8.6 MG/1; MG/1
2 TABLET, FILM COATED ORAL 2 TIMES DAILY PRN
Qty: 28 TABLET | Refills: 10 | Status: SHIPPED | OUTPATIENT
Start: 2025-03-31 | End: 2026-03-31

## 2025-03-31 RX ORDER — MORPHINE SULFATE 30 MG/1
30 TABLET, FILM COATED, EXTENDED RELEASE ORAL EVERY 12 HOURS
Qty: 120 TABLET | Refills: 0 | Status: SHIPPED | OUTPATIENT
Start: 2025-03-31 | End: 2026-03-31

## 2025-03-31 RX ORDER — DEXAMETHASONE 4 MG/1
4 TABLET ORAL 2 TIMES DAILY
Qty: 60 TABLET | Refills: 11 | Status: SHIPPED | OUTPATIENT
Start: 2025-03-31 | End: 2026-03-31

## 2025-03-31 RX ORDER — MORPHINE SULFATE 100 MG/5ML
10-20 SOLUTION ORAL
Qty: 240 ML | Refills: 0 | Status: SHIPPED | OUTPATIENT
Start: 2025-03-31 | End: 2026-03-31

## 2025-03-31 RX ORDER — BISACODYL 10 MG
10 SUPPOSITORY, RECTAL RECTAL PRN
Qty: 5 SUPPOSITORY | Refills: 10 | Status: SHIPPED | OUTPATIENT
Start: 2025-03-31 | End: 2026-03-31

## 2025-03-31 RX ORDER — OXYCODONE HYDROCHLORIDE 5 MG/1
5 TABLET ORAL
Status: DISCONTINUED | OUTPATIENT
Start: 2025-03-31 | End: 2025-03-31

## 2025-03-31 RX ADMIN — HYDROMORPHONE HYDROCHLORIDE 0.5 MG: 1 INJECTION, SOLUTION INTRAMUSCULAR; INTRAVENOUS; SUBCUTANEOUS at 15:11

## 2025-03-31 RX ADMIN — MIDAZOLAM HYDROCHLORIDE 2 MG: 1 INJECTION INTRAMUSCULAR; INTRAVENOUS at 11:28

## 2025-03-31 RX ADMIN — OXYCODONE HYDROCHLORIDE 10 MG: 10 TABLET ORAL at 03:43

## 2025-03-31 RX ADMIN — FENTANYL CITRATE 50 MCG: 50 INJECTION, SOLUTION INTRAMUSCULAR; INTRAVENOUS at 11:30

## 2025-03-31 RX ADMIN — LIDOCAINE HYDROCHLORIDE AND EPINEPHRINE: 10; 10 INJECTION, SOLUTION INFILTRATION; PERINEURAL at 11:30

## 2025-03-31 RX ADMIN — HYDROMORPHONE HYDROCHLORIDE 0.5 MG: 1 INJECTION, SOLUTION INTRAMUSCULAR; INTRAVENOUS; SUBCUTANEOUS at 13:06

## 2025-03-31 RX ADMIN — DEXAMETHASONE 4 MG: 4 TABLET ORAL at 05:47

## 2025-03-31 RX ADMIN — MORPHINE SULFATE 15 MG: 15 TABLET, FILM COATED, EXTENDED RELEASE ORAL at 05:47

## 2025-03-31 RX ADMIN — DOCUSATE SODIUM 100 MG: 100 CAPSULE, LIQUID FILLED ORAL at 05:47

## 2025-03-31 RX ADMIN — OXYCODONE HYDROCHLORIDE 10 MG: 10 TABLET ORAL at 08:45

## 2025-03-31 RX ADMIN — MORPHINE SULFATE 10 MG: 100 SOLUTION ORAL at 17:46

## 2025-03-31 RX ADMIN — MIDAZOLAM HYDROCHLORIDE 2 MG: 1 INJECTION, SOLUTION INTRAMUSCULAR; INTRAVENOUS at 11:28

## 2025-03-31 RX ADMIN — DOCUSATE SODIUM 100 MG: 100 CAPSULE, LIQUID FILLED ORAL at 17:17

## 2025-03-31 RX ADMIN — MOMETASONE FUROATE AND FORMOTEROL FUMARATE DIHYDRATE 2 PUFF: 200; 5 AEROSOL RESPIRATORY (INHALATION) at 19:31

## 2025-03-31 RX ADMIN — MORPHINE SULFATE 15 MG: 15 TABLET, FILM COATED, EXTENDED RELEASE ORAL at 17:17

## 2025-03-31 RX ADMIN — DEXAMETHASONE 4 MG: 4 TABLET ORAL at 13:06

## 2025-03-31 RX ADMIN — FENTANYL CITRATE 50 MCG: 50 INJECTION, SOLUTION INTRAMUSCULAR; INTRAVENOUS at 11:28

## 2025-03-31 RX ADMIN — FENTANYL CITRATE 50 MCG: 50 INJECTION, SOLUTION INTRAMUSCULAR; INTRAVENOUS at 11:27

## 2025-03-31 RX ADMIN — MOMETASONE FUROATE AND FORMOTEROL FUMARATE DIHYDRATE 2 PUFF: 200; 5 AEROSOL RESPIRATORY (INHALATION) at 15:13

## 2025-03-31 RX ADMIN — MORPHINE SULFATE 10 MG: 100 SOLUTION ORAL at 12:43

## 2025-03-31 RX ADMIN — CEFAZOLIN 2 G: 2 INJECTION, POWDER, FOR SOLUTION INTRAMUSCULAR; INTRAVENOUS at 11:20

## 2025-03-31 RX ADMIN — TRAZODONE HYDROCHLORIDE 50 MG: 100 TABLET ORAL at 19:31

## 2025-03-31 RX ADMIN — Medication 5 MG: at 19:31

## 2025-03-31 ASSESSMENT — PAIN DESCRIPTION - PAIN TYPE
TYPE: ACUTE PAIN

## 2025-03-31 ASSESSMENT — ACTIVITIES OF DAILY LIVING (ADL)
AMBULATION_REQUIRES_ASSISTANCE: 1
PHYSICAL_TRANSFER_REQUIRES_ASSISTANCE: 1
CONTINENCE_REQUIRES_ASSISTANCE: 1
DRESSING_REQUIRES_ASSISTANCE: 1
BATHING_REQUIRES_ASSISTANCE: 1

## 2025-03-31 ASSESSMENT — ENCOUNTER SYMPTOMS
SHORTNESS OF BREATH: 1
SHORTNESS OF BREATH: 1

## 2025-03-31 ASSESSMENT — COPD QUESTIONNAIRES: COPD: 1

## 2025-03-31 NOTE — OR SURGEON
Immediate Post- Operative Note        Findings: Recurrent Pleural Effusion      Procedure(s): RT tunnelled Pleurex Catheter Placement      Estimated Blood Loss: Less than 5 ml        Complications: None            3/31/2025     11:48 AM     Harlan Albright M.D.

## 2025-03-31 NOTE — DISCHARGE PLANNING
DC Transport Scheduled    Transport Company Scheduled:  [facility]    Scheduled Date: 4/1/2025  Scheduled Time: 0900    Transport Type: [place]  Destination:   pts home   Destination address: [address] [place] [address] [place] [address]    Notified care team of scheduled transport via Voalte.     If there are any changes needed to the DC transportation scheduled, please contact [facility] at ext. [phone] between the hours of 9385-2744. If outside those hours, contact the ED Case Manager at ext. [phone].

## 2025-03-31 NOTE — CARE PLAN
The patient is Watcher - Medium risk of patient condition declining or worsening    Shift Goals  Clinical Goals: comfort care  Patient Goals: rest  Family Goals: pain control    Progress made toward(s) clinical / shift goals:        Problem: Pain - Standard  Goal: Alleviation of pain or a reduction in pain to the patient’s comfort goal  Outcome: Progressing  Flowsheets (Taken 3/31/2025 0025)  Non Verbal Scale:   Calm   Sleeping   Unlabored Breathing     Problem: Fall Risk  Goal: Patient will remain free from falls  Outcome: Progressing  Note: Patient call light within reach, non-skid socks on, bed alarm on, patient calls appropriately, belongings within reach, fall precaution wristband on, fall precaution sign outside patient room.     Problem: Psychosocial - Comfort Care  Goal: Spiritual and cultural needs incorporated into hospitalization  Outcome: Progressing  Goal: Patient and family will demonstrate ability to cope with life altering diagnosis and/or procedure  Outcome: Progressing  Goal: Privacy will be maintained for patient and family  Outcome: Progressing       Patient is not progressing towards the following goals: N/A

## 2025-03-31 NOTE — PROGRESS NOTES
Hospital Medicine Daily Progress Note    Date of Service  3/30/2025    Chief Complaint  Emily Barnes is a 80 y.o. female admitted 3/25/2025 with small cell lung cancer, on comfort care.  She is awaiting Pleurx placement with interventional radiology 3/31.    Hospital Course  The patient was admitted for acute hypoxemic respiratory failure in the setting of stage IV small cell lung cancer. She underwent imaging that revealed a large right hydropneumothorax.  Patient underwent thoracentesis and had a questionable right-sided pneumothorax thereafter.  Repeat imaging reveals no evidence of right sided pneumothorax.  The patient's goals of care were discussed between the patient and the palliative care.  She reported to palliative care that she does not want her cancer treated.  She is agreeable to hospice at home.  The patient is agreeable to having a Pleurx placed prior to discharge home on hospice.  The patient agreeable to comfort care as of 3/29.    Interval Problem Update  Patient seen and evaluated at bedside  Pertinent labs and imaging reviewed  Afebrile, hemodynamically stable, no oxygen requirements  Voiding, stooling, tolerating oral intake well  Previous bowel movement 3/30  On comfort care    I have discussed this patient's plan of care and discharge plan at IDT rounds today with Case Management, Nursing, Nursing leadership, and other members of the IDT team.    Consultants/Specialty  None    Code Status  Comfort Care/DNR    Disposition  The patient is not medically cleared for discharge to home or a post-acute facility.      I have placed the appropriate orders for post-discharge needs.    Review of Systems  Review of Systems   All other systems reviewed and are negative.       Physical Exam  Temp:  [37 °C (98.6 °F)] 37 °C (98.6 °F)  Pulse:  [69] 69  Resp:  [17] 17  BP: (168)/(81) 168/81  SpO2:  [85 %] 85 %    Physical Exam  Constitutional:       General: She is not in acute distress.     Appearance:  Normal appearance. She is normal weight.   HENT:      Head: Normocephalic and atraumatic.      Nose: Nose normal.   Eyes:      Extraocular Movements: Extraocular movements intact.   Pulmonary:      Effort: Pulmonary effort is normal. No respiratory distress.   Musculoskeletal:      Cervical back: Normal range of motion.      Right lower leg: No edema.      Left lower leg: No edema.   Skin:     General: Skin is dry.   Neurological:      General: No focal deficit present.      Mental Status: She is alert and oriented to person, place, and time.   Psychiatric:         Mood and Affect: Mood normal.         Behavior: Behavior normal.         Thought Content: Thought content normal.         Fluids  No intake or output data in the 24 hours ending 03/30/25 1710       Laboratory  Recent Labs     03/28/25  0518   WBC 6.1   RBC 3.75*   HEMOGLOBIN 11.9*   HEMATOCRIT 33.6*   MCV 89.6   MCH 31.7   MCHC 35.4   RDW 52.7*   PLATELETCT 224   MPV 10.3     Recent Labs     03/28/25  0518   SODIUM 144   POTASSIUM 3.9   CHLORIDE 115*   CO2 19*   GLUCOSE 121*   BUN 16   CREATININE 0.40*   CALCIUM 8.5     Recent Labs     03/28/25  0518   INR 1.49*               Imaging  DX-CHEST-LIMITED (1 VIEW)   Final Result      1.  Diffuse opacification of the right lower lobe consistent with pneumonitis and/or pleural effusion.      2.  Blunting of right costophrenic angle consistent with pleural effusion.      DX-CHEST-PORTABLE (1 VIEW)   Final Result      No evidence of right-sided pneumothorax status post thoracentesis.      DX-CHEST-PORTABLE (1 VIEW)   Final Result      1.  Questionable small right pneumothorax.   2.  Small right pleural effusion the adjacent airspace disease.   3.  Cardiomegaly.      Dr. Reddy discussed these findings with Brianna Mcbride at 3/27/2025 1:20 PM      US-THORACENTESIS PUNCTURE RIGHT   Final Result      1. Ultrasound guided right sided diagnostic and therapeutic thoracentesis.      2. 1,100 mL of fluid withdrawn.       DX-CHEST-LIMITED (1 VIEW)   Final Result      1.  Increased moderate right pleural effusion with associated compressive atelectasis and/or consolidation.   2.  Mild diffuse interstitial prominence which may related to edema or infection.   3.  Stable enlargement of the cardiomediastinal silhouette.      DX-CHEST-LIMITED (1 VIEW)   Final Result      1.  Small right hydropneumothorax.   2.  Right lung interstitial and airspace opacities, greater inferiorly.      Findings conveyed to Dr. Sosa at 3/26/2025 9:19 AM via Voalte messaging.      CT-LSPINE W/O PLUS RECONS   Final Result   Impression:      1. Osteopenia and advanced degenerative disc disease at L4-L5.   2. No definite fracture or malalignment.      CT-TSPINE W/O PLUS RECONS   Final Result      Multilevel degenerative disease without acute fracture or malalignment.      CT-CHEST,ABDOMEN,PELVIS W/O   Final Result      1.  Large right hydropneumothorax.      2.  Multifocal nodular masses in the right lower lobe. Additionally there are pleural-based masses posteriorly in the base of the right hemithorax. These findings are suspicious for metastatic disease.      3.  3.4 cm anterior mediastinal mass suspicious for metastatic disease.      4.  Nonobstructing left upper pole nephrolithiasis.      5.  4.2 cm infrarenal aortic aneurysm.      6.  These findings were discussed with DUARTE FRY at 6:00 PM 3/25/2025      IR-CONSULT AND TREAT    (Results Pending)        Assessment/Plan  * Malignant pleural effusion  Assessment & Plan  Recurrent pleural effusion on the right side  Cytology last time was positive for malignancy cells  Thoracentesis was done on 3/27  IR was consulted for Pleurx catheter    Hypomagnesemia  Assessment & Plan  Replace IV    Cancer associated pain  Assessment & Plan  Patient is always on right back pain  Modal pain management available  On comfort care    Infrarenal abdominal aortic aneurysm (AAA) without rupture (HCC)  Assessment &  Plan  As seen on CT    Chronic right-sided low back pain without sciatica  Assessment & Plan  Acute on chronic  Pain control  Comfort care while awaiting Pleurx placement and discharging home on hospice    Hypokalemia  Assessment & Plan  Likely related to low oral intake  Labs daily and replace orally    Mixed hyperlipidemia  Assessment & Plan  Comfort care    Primary hypertension  Assessment & Plan  Holding home med lisinopril in view of ongoing JUDE  On comfort care    ACP (advance care planning)  Assessment & Plan  3/25  Patient was alert and oriented x 4, plan of care was discussed in detail with the patient and her niece on the phone, answered all her questions, discussed the advanced cancer, discussed all treatment options including hospice and comfort care, patient dictated that she wants to be DNR/DNI however refused hospice at this time, have more conversation tomorrow.  Niece states she is moving to [facility] to be next to her.  Time 30-minute.    3/27   again the plan of care was discussed with the patient and palliative at bedside, answered all her questions, the patient agreed for hospice referral, DNR/DNI, patient wants to pain medication, she states she is always in pain, also palliative team discussed the case with the niece the power of  and agreed for hospice referral, time 25 minutes    3/29  Patient agreeable to comfort care    Hydropneumothorax  Assessment & Plan  S/p thoracentesis, on room air  Comfort care while awaiting Pleurx placement and discharging home on hospice    Hypocalcemia  Assessment & Plan  Check vitamin D and magnesium  Replaced IV and orally  Continue monitoring telemetry    JUDE (acute kidney injury) (HCC)- (present on admission)  Assessment & Plan  Resolved  No longer monitoring in the setting of comfort care    Smoking- (present on admission)  Assessment & Plan  History of    COPD with asthma (HCC)- (present on admission)  Assessment & Plan  Significant history of smoking    Comfort care while awaiting Pleurx placement and discharging home on hospice      Small cell lung cancer (HCC)- (present on admission)  Assessment & Plan  Recurrent stage IV small cell lung cancer   Patient was on palliative chemo however now has been off of chemo from past several months.  Hospice referral  Comfort care while awaiting Pleurx placement and discharging home on hospice        Breast cancer - (present on admission)  Assessment & Plan  H/o in 2011  s/p partial mastectomy in 2011  As per pt she had refused chemotherapy then but was cancer free with repeat tests.  Last mammography in May 2024 which did not show any obvious malignancy  Comfort care  Patient to be discharged on hospice following the placement of Pleurx           VTE prophylaxis:    pharmacologic prophylaxis contraindicated due to comfort care      I have performed a physical exam and reviewed and updated ROS and Plan today (3/30/2025). In review of yesterday's note (3/29/2025), there are no changes except as documented above.

## 2025-03-31 NOTE — CARE PLAN
The patient is Watcher - Medium risk of patient condition declining or worsening    Shift Goals  Clinical Goals: Remain comfortable through shift, pluerex cath placement  Patient Goals: Rest comfortably  Family Goals: Updates on POC    Progress made toward(s) clinical / shift goals:  Patient received pluerex drain. Patient has remained comfortable throughout the shift. Family updated on plan of care and has no further questions at this time.

## 2025-03-31 NOTE — DISCHARGE PLANNING
Case Management Discharge Planning    Admission Date: 3/25/2025  GMLOS: 4.8  ALOS: 6    6-Clicks ADL Score: 21  6-Clicks Mobility Score: 22      Anticipated Discharge Dispo: Discharge Disposition: D/T to hospice home (50)  Discharge Address: [address] [place] [address] [address] [place] [address]  Discharge Contact Phone Number: [phone]    DME Needed: Yes    DME Ordered: Yes    Action(s) Taken: Discussed in IDT rounds, pt will have [facility] once discharged. Waiting for DME to arrive. DC is at 0900 on 4/1/25    Escalations Completed: None    Medically Clear: Yes    Next Steps: Pending clearance    Barriers to Discharge: Transportation    Is the patient up for discharge tomorrow: Yes    Is transport arranged for discharge disposition: Yes

## 2025-03-31 NOTE — PROGRESS NOTES
Pt presents to IR3. Patient was consented by MD at bedside, confirmed by this RN and consent at bedside. Pt transferred to IR table in prone position. Patient underwent a tunneled plural catheter pplacement by Dr. Albright. Procedure site was marked by MD and verified using imaging guidance. Pt placed on monitor, prepped and draped in a sterile fashion. Vitals were taken every 5 minutes and remained stable during procedure (see doc flow sheet for results). CO2 waveform capnography was monitored and remained WNL throughout procedure. Pt transported by stretcher with RN to R311.         Aspiria plural catheter 15.5 F  REF: [id number]  LOT: [id number]  EXP: 05/31/2026

## 2025-04-01 PROCEDURE — 700102 HCHG RX REV CODE 250 W/ 637 OVERRIDE(OP): Performed by: INTERNAL MEDICINE

## 2025-04-01 PROCEDURE — 700102 HCHG RX REV CODE 250 W/ 637 OVERRIDE(OP): Performed by: STUDENT IN AN ORGANIZED HEALTH CARE EDUCATION/TRAINING PROGRAM

## 2025-04-01 PROCEDURE — A9270 NON-COVERED ITEM OR SERVICE: HCPCS | Performed by: STUDENT IN AN ORGANIZED HEALTH CARE EDUCATION/TRAINING PROGRAM

## 2025-04-01 PROCEDURE — 700102 HCHG RX REV CODE 250 W/ 637 OVERRIDE(OP): Performed by: NURSE PRACTITIONER

## 2025-04-01 PROCEDURE — A9270 NON-COVERED ITEM OR SERVICE: HCPCS | Performed by: INTERNAL MEDICINE

## 2025-04-01 PROCEDURE — 99239 HOSP IP/OBS DSCHRG MGMT >30: CPT | Performed by: GENERAL PRACTICE

## 2025-04-01 RX ADMIN — DOCUSATE SODIUM 100 MG: 100 CAPSULE, LIQUID FILLED ORAL at 04:33

## 2025-04-01 RX ADMIN — POLYETHYLENE GLYCOL 3350 1 PACKET: 17 POWDER, FOR SOLUTION ORAL at 04:33

## 2025-04-01 RX ADMIN — DEXAMETHASONE 4 MG: 4 TABLET ORAL at 04:33

## 2025-04-01 RX ADMIN — MOMETASONE FUROATE AND FORMOTEROL FUMARATE DIHYDRATE 2 PUFF: 200; 5 AEROSOL RESPIRATORY (INHALATION) at 07:20

## 2025-04-01 RX ADMIN — MORPHINE SULFATE 15 MG: 15 TABLET, FILM COATED, EXTENDED RELEASE ORAL at 04:33

## 2025-04-01 ASSESSMENT — PAIN DESCRIPTION - PAIN TYPE
TYPE: ACUTE PAIN
TYPE: ACUTE PAIN

## 2025-04-01 NOTE — PROGRESS NOTES
Hospital Medicine Daily Progress Note    Date of Service  3/31/2025    Chief Complaint  Emily Barnes is a 80 y.o. female admitted 3/25/2025 with small cell lung cancer, on comfort care.  She is awaiting Pleurx placement with interventional radiology 3/31.    Hospital Course  The patient was admitted for acute hypoxemic respiratory failure in the setting of stage IV small cell lung cancer. She underwent imaging that revealed a large right hydropneumothorax. She underwent thoracentesis with 1 L of fluid removed.  Her thoracentesis fluid was positive for malignant cells.    This case was discussed with medical oncology that was concerned that her current therapy is not working.  Additionally, she demonstrated a poor performance status and overall poor tolerability of aggressive therapies.  Medical oncology does not think the patient would tolerate any additional chemotherapy and recommended hospice and comfort care.    The patient discussed end-of-life care with palliative care and agreed to be discharged home on hospice.  She elected to transition to comfort care status while in the hospital.  The patient requested a Pleurx to be placed prior to discharge for her comfort.  This will coordinate with interventional radiology on the day of discharge.    Interval Problem Update  Patient seen and evaluated at bedside  Pertinent labs and imaging reviewed  Afebrile, hemodynamically stable, no oxygen requirements  Voiding, stooling, tolerating oral intake well  Previous bowel movement 3/30  On comfort care    I have discussed this patient's plan of care and discharge plan at IDT rounds today with Case Management, Nursing, Nursing leadership, and other members of the IDT team.    Consultants/Specialty  None    Code Status  Comfort Care/DNR    Disposition  The patient is not medically cleared for discharge to home or a post-acute facility.      I have placed the appropriate orders for post-discharge needs.    Review of  Systems  Review of Systems   Respiratory:  Positive for shortness of breath.    All other systems reviewed and are negative.       Physical Exam  Temp:  [37.1 °C (98.7 °F)] 37.1 °C (98.7 °F)  Pulse:  [71-86] 86  Resp:  [12-20] 12  BP: (128-158)/(65-83) 137/66  SpO2:  [87 %-97 %] 96 %    Physical Exam  Constitutional:       General: She is not in acute distress.     Appearance: Normal appearance. She is normal weight.   HENT:      Head: Normocephalic and atraumatic.      Nose: Nose normal.   Eyes:      Extraocular Movements: Extraocular movements intact.   Pulmonary:      Effort: Pulmonary effort is normal. No respiratory distress.      Breath sounds: Wheezing present.   Musculoskeletal:      Cervical back: Normal range of motion.      Right lower leg: No edema.      Left lower leg: No edema.   Skin:     General: Skin is dry.   Neurological:      General: No focal deficit present.      Mental Status: She is alert and oriented to person, place, and time.   Psychiatric:         Mood and Affect: Mood normal.         Behavior: Behavior normal.         Thought Content: Thought content normal.         Fluids    Intake/Output Summary (Last 24 hours) at 3/31/2025 1812  Last data filed at 3/31/2025 1500  Gross per 24 hour   Intake --   Output 1000 ml   Net -1000 ml          Laboratory                              Imaging  IR-INSERT PLEURAL CATH W/ CUFF   Final Result      1.  Ultrasound guided placement of a Right hemithorax PleurX (Aspira type) tunneled pleural drainage catheter.   2.  The catheter can be used immediately with drainage of pleural effusion as needed for comfort, as per package insert instructions.   3.  If the catheter becomes dislodged, do not reinsert it.  Place sterile dressing over the entry wound site.  May consult primary care physician or oncologist as to whether there is clinical indication for PleurX replacement.      DX-CHEST-LIMITED (1 VIEW)   Final Result      1.  Diffuse opacification of the  right lower lobe consistent with pneumonitis and/or pleural effusion.      2.  Blunting of right costophrenic angle consistent with pleural effusion.      DX-CHEST-PORTABLE (1 VIEW)   Final Result      No evidence of right-sided pneumothorax status post thoracentesis.      DX-CHEST-PORTABLE (1 VIEW)   Final Result      1.  Questionable small right pneumothorax.   2.  Small right pleural effusion the adjacent airspace disease.   3.  Cardiomegaly.      Dr. Reddy discussed these findings with Brianna Mcbride at 3/27/2025 1:20 PM      US-THORACENTESIS PUNCTURE RIGHT   Final Result      1. Ultrasound guided right sided diagnostic and therapeutic thoracentesis.      2. 1,100 mL of fluid withdrawn.      DX-CHEST-LIMITED (1 VIEW)   Final Result      1.  Increased moderate right pleural effusion with associated compressive atelectasis and/or consolidation.   2.  Mild diffuse interstitial prominence which may related to edema or infection.   3.  Stable enlargement of the cardiomediastinal silhouette.      DX-CHEST-LIMITED (1 VIEW)   Final Result      1.  Small right hydropneumothorax.   2.  Right lung interstitial and airspace opacities, greater inferiorly.      Findings conveyed to Dr. Sosa at 3/26/2025 9:19 AM via [employer] messaging.      CT-LSPINE W/O PLUS RECONS   Final Result   Impression:      1. Osteopenia and advanced degenerative disc disease at L4-L5.   2. No definite fracture or malalignment.      CT-TSPINE W/O PLUS RECONS   Final Result      Multilevel degenerative disease without acute fracture or malalignment.      CT-CHEST,ABDOMEN,PELVIS W/O   Final Result      1.  Large right hydropneumothorax.      2.  Multifocal nodular masses in the right lower lobe. Additionally there are pleural-based masses posteriorly in the base of the right hemithorax. These findings are suspicious for metastatic disease.      3.  3.4 cm anterior mediastinal mass suspicious for metastatic disease.      4.  Nonobstructing left upper pole  nephrolithiasis.      5.  4.2 cm infrarenal aortic aneurysm.      6.  These findings were discussed with DUARTE FRY at 6:00 PM 3/25/2025      DX-CHEST-PORTABLE (1 VIEW)    (Results Pending)        Assessment/Plan  * Malignant pleural effusion  Assessment & Plan  Recurrent pleural effusion on the right side  Cytology last time was positive for malignancy cells  Thoracentesis was done on 3/27  IR was consulted for Pleurx catheter    Hypomagnesemia  Assessment & Plan  Replace IV    Cancer associated pain  Assessment & Plan  Patient is always on right back pain  Modal pain management available  On comfort care    Infrarenal abdominal aortic aneurysm (AAA) without rupture (HCC)  Assessment & Plan  As seen on CT    Chronic right-sided low back pain without sciatica  Assessment & Plan  Acute on chronic  Pain control  Comfort care while awaiting Pleurx placement and discharging home on hospice    Hypokalemia  Assessment & Plan  Likely related to low oral intake  Labs daily and replace orally    Mixed hyperlipidemia  Assessment & Plan  Comfort care    Primary hypertension  Assessment & Plan  Holding home med lisinopril in view of ongoing JUDE  On comfort care    ACP (advance care planning)  Assessment & Plan  3/25  Patient was alert and oriented x 4, plan of care was discussed in detail with the patient and her niece on the phone, answered all her questions, discussed the advanced cancer, discussed all treatment options including hospice and comfort care, patient dictated that she wants to be DNR/DNI however refused hospice at this time, have more conversation tomorrow.  Niece states she is moving to [place] to be next to her.  Time 30-minute.    3/27   again the plan of care was discussed with the patient and palliative at bedside, answered all her questions, the patient agreed for hospice referral, DNR/DNI, patient wants to pain medication, she states she is always in pain, also palliative team discussed the case  with the niece the power of  and agreed for hospice referral, time 25 minutes    3/29  Patient agreeable to comfort care    Hydropneumothorax  Assessment & Plan  S/p thoracentesis, on room air  Comfort care while awaiting Pleurx placement and discharging home on hospice    Hypocalcemia  Assessment & Plan  Check vitamin D and magnesium  Replaced IV and orally  Continue monitoring telemetry    JUDE (acute kidney injury) (HCC)- (present on admission)  Assessment & Plan  Resolved  No longer monitoring in the setting of comfort care    Smoking- (present on admission)  Assessment & Plan  History of    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  Hospice referral  Comfort care while awaiting Pleurx placement and discharging home on hospice        Breast cancer - (present on admission)  Assessment & Plan  H/o in 2011  s/p partial mastectomy in 2011  As per pt she had refused chemotherapy then but was cancer free with repeat tests.  Last mammography in May 2024 which did not show any obvious malignancy  Comfort care  Patient to be discharged on hospice following the placement of Pleurx           VTE prophylaxis:    pharmacologic prophylaxis contraindicated due to Comfort care      I have performed a physical exam and reviewed and updated ROS and Plan today (3/31/2025). In review of yesterday's note (3/30/2025), there are no changes except as documented above.

## 2025-04-01 NOTE — CARE PLAN
The patient is Watcher - Medium risk of patient condition declining or worsening    Shift Goals  Clinical Goals: Patient will remain comfortable through the shift  Patient Goals: rest  Family Goals: not at bedside    Progress made toward(s) clinical / shift goals:        Problem: Pain - Standard  Goal: Alleviation of pain or a reduction in pain to the patient’s comfort goal  Outcome: Progressing  Flowsheets (Taken 4/1/2025 9135)  Pain Rating Scale (NPRS): 2  Note: Pain frequently accessed, patient is able to verbalize needs, pain controlled with prn medications.     Problem: Fall Risk  Goal: Patient will remain free from falls  Outcome: Progressing  Note: Patient call light within reach, non-skid socks on, bed alarm on, patient calls appropriately, belongings within reach, fall precaution wristband on, fall precaution sign outside patient room.     Problem: Knowledge Deficit - Comfort Care  Goal: Patient and family/care givers will demonstrate understanding of dying process and grieving  Outcome: Progressing     Problem: Psychosocial - Comfort Care  Goal: Patient and family will demonstrate ability to cope with life altering diagnosis and/or procedure  Outcome: Progressing  Goal: Privacy will be maintained for patient and family  Outcome: Progressing       Patient is not progressing towards the following goals: N/A

## 2025-04-01 NOTE — PROGRESS NOTES
Brief interventional radiology progress note:    Patient discharged to hospice prior to IR rounding.  The Pleurx supply box delivered post IR procedure.

## 2025-04-01 NOTE — DISCHARGE SUMMARY
Discharge Summary    CHIEF COMPLAINT ON ADMISSION  Chief Complaint   Patient presents with    Abdominal Pain     Pt BIB [facility] for L lower abdominal and back pain times one week. Pt Hx of lung cancer. Reporting 10/10 low back pain per arrival to ED.        Reason for Admission  JUDE (acute kidney injury) (HCC)     Admission Date  3/25/2025    CODE STATUS  Comfort Care/DNR    HPI & HOSPITAL COURSE  This is a 80-year-old female with past medical history of stage IV small cell lung cancer who was admitted on 3/25 with acute hypoxemic respiratory failure.    Imaging was concerning for a large right hydropneumothorax.  Patient underwent thoracentesis, imaging afterwards revealed no right-sided pneumothorax.  After an extensive overall goals of care conversation, patient transitioned herself to hospice on 3/29.  Pleurx catheter was placed on 3/31.    Therefore, she is discharged in guarded and stable condition to hospice.    The patient met 2-midnight criteria for an inpatient stay at the time of discharge.    Discharge Date  4/1/2025    FOLLOW UP ITEMS POST DISCHARGE  Hospice    DISCHARGE DIAGNOSES  Principal Problem:    Malignant pleural effusion (POA: Unknown)  Active Problems:    Breast cancer  (POA: Yes)    Small cell lung cancer (HCC) (POA: Yes)    COPD with asthma (HCC) (POA: Yes)    Smoking (POA: Yes)    JUDE (acute kidney injury) (HCC) (POA: Yes)    Hypocalcemia (POA: Unknown)    Hydropneumothorax (POA: Unknown)    ACP (advance care planning) (POA: Unknown)    Primary hypertension (POA: Unknown)    Mixed hyperlipidemia (POA: Unknown)    Hypokalemia (POA: Unknown)    Chronic right-sided low back pain without sciatica (POA: Unknown)    Infrarenal abdominal aortic aneurysm (AAA) without rupture (HCC) (POA: Unknown)    Cancer associated pain (POA: Unknown)    Hypomagnesemia (POA: Unknown)  Resolved Problems:    Elevated procalcitonin (POA: Unknown)      FOLLOW UP  Future Appointments   Date Time Provider Department  Center   4/1/2025 10:00 AM Lorena Marcos R.N. [facility] None       MEDICATIONS ON DISCHARGE     Medication List        STOP taking these medications      atorvastatin 10 MG Tabs  Commonly known as: Lipitor     azithromycin 250 MG Tabs  Commonly known as: Zithromax     lisinopril 10 MG Tabs  Commonly known as: Prinivil              Allergies  Allergies   Allergen Reactions    Flu Virus Vaccine Hives       DIET  Orders Placed This Encounter   Procedures    Diet Order Diet: Regular     Standing Status:   Standing     Number of Occurrences:   1     Diet::   Regular [1]       ACTIVITY  As tolerated.  Weight bearing as tolerated    CONSULTATIONS  General surgery  Palliative Care  Oncology  IR    PROCEDURES  Pleurx 3/31    LABORATORY  Lab Results   Component Value Date    SODIUM 144 03/28/2025    POTASSIUM 3.9 03/28/2025    CHLORIDE 115 (H) 03/28/2025    CO2 19 (L) 03/28/2025    GLUCOSE 121 (H) 03/28/2025    BUN 16 03/28/2025    CREATININE 0.40 (L) 03/28/2025        Lab Results   Component Value Date    WBC 6.1 03/28/2025    HEMOGLOBIN 11.9 (L) 03/28/2025    HEMATOCRIT 33.6 (L) 03/28/2025    PLATELETCT 224 03/28/2025      DX-CHEST-PORTABLE (1 VIEW)   Final Result      1. Interval placement of a right pleural catheter, with a small right apical pneumothorax measuring 7.6 mm in thickness. No tension component.   2. Small right pleural effusion with persistent right basilar opacity.   3. The remainder is stable.      I, Dr. Jeovanny Jang, conveyed the results of this examination directly by phone to KHALIF Rivera on 3/31/2025 at 1852 hours.      IR-INSERT PLEURAL CATH W/ CUFF   Final Result      1.  Ultrasound guided placement of a Right hemithorax PleurX (Aspira type) tunneled pleural drainage catheter.   2.  The catheter can be used immediately with drainage of pleural effusion as needed for comfort, as per package insert instructions.   3.  If the catheter becomes dislodged, do not reinsert it.  Place sterile dressing over the  entry wound site.  May consult primary care physician or oncologist as to whether there is clinical indication for PleurX replacement.      DX-CHEST-LIMITED (1 VIEW)   Final Result      1.  Diffuse opacification of the right lower lobe consistent with pneumonitis and/or pleural effusion.      2.  Blunting of right costophrenic angle consistent with pleural effusion.      DX-CHEST-PORTABLE (1 VIEW)   Final Result      No evidence of right-sided pneumothorax status post thoracentesis.      DX-CHEST-PORTABLE (1 VIEW)   Final Result      1.  Questionable small right pneumothorax.   2.  Small right pleural effusion the adjacent airspace disease.   3.  Cardiomegaly.      Dr. Reddy discussed these findings with Brianna Mcbride at 3/27/2025 1:20 PM      US-THORACENTESIS PUNCTURE RIGHT   Final Result      1. Ultrasound guided right sided diagnostic and therapeutic thoracentesis.      2. 1,100 mL of fluid withdrawn.      DX-CHEST-LIMITED (1 VIEW)   Final Result      1.  Increased moderate right pleural effusion with associated compressive atelectasis and/or consolidation.   2.  Mild diffuse interstitial prominence which may related to edema or infection.   3.  Stable enlargement of the cardiomediastinal silhouette.      DX-CHEST-LIMITED (1 VIEW)   Final Result      1.  Small right hydropneumothorax.   2.  Right lung interstitial and airspace opacities, greater inferiorly.      Findings conveyed to Dr. Sosa at 3/26/2025 9:19 AM via [employer] messaging.      CT-LSPINE W/O PLUS RECONS   Final Result   Impression:      1. Osteopenia and advanced degenerative disc disease at L4-L5.   2. No definite fracture or malalignment.      CT-TSPINE W/O PLUS RECONS   Final Result      Multilevel degenerative disease without acute fracture or malalignment.      CT-CHEST,ABDOMEN,PELVIS W/O   Final Result      1.  Large right hydropneumothorax.      2.  Multifocal nodular masses in the right lower lobe. Additionally there are pleural-based masses  posteriorly in the base of the right hemithorax. These findings are suspicious for metastatic disease.      3.  3.4 cm anterior mediastinal mass suspicious for metastatic disease.      4.  Nonobstructing left upper pole nephrolithiasis.      5.  4.2 cm infrarenal aortic aneurysm.      6.  These findings were discussed with DUARTE FRY at 6:00 PM 3/25/2025           Total time of the discharge process exceeds 45 minutes.

## 2025-04-07 NOTE — PROGRESS NOTES
Patient discussed with Linda Reyes RN. Patient no longer able to swallow. She has had 160 mg of  MS over the past 24 hours. New order for scheduled MSIR 30 mg every 4 hours with lorazepam 1 mg every 4 hours.

## 2025-04-24 LAB
FUNGUS SPEC CULT: NORMAL
FUNGUS SPEC FUNGUS STN: NORMAL
SIGNIFICANT IND 70042: NORMAL
SITE SITE: NORMAL
SOURCE SOURCE: NORMAL

## 2025-05-23 LAB
FINAL REPORT Q0603: NORMAL
PRELIMINARY RPT Q0601: NORMAL
RHODAMINE-AURAMINE STN SPEC: NORMAL

## 2025-06-03 NOTE — DISCHARGE INSTRUCTIONS
Follow-up with your doctor for recheck as soon as possible.  Return if worse or for any concerns   social work

## (undated) DEVICE — SPONGE GAUZESTER 4 X 4 4PLY - (128PK/CA)

## (undated) DEVICE — TUBE E-T HI-LO CUFF 6.5MM (10EA/BX)

## (undated) DEVICE — BITE BLOCK ADULT 60FR (100EA/CA)

## (undated) DEVICE — CATHETER IV SAFETY 22 GA X 1 (50EA/BX)

## (undated) DEVICE — PROBE ENDOSCOPIC PERIPHERAL VISION ION 1.5 IF1000 (1/EA)

## (undated) DEVICE — TUBING CLEARLINK DUO-VENT - C-FLO (48EA/CA)

## (undated) DEVICE — KIT CUSTOM PROCEDURE SINGLE FOR ENDO  (15/CA)

## (undated) DEVICE — SENSOR SPO2 ADULT LNCS ADTX (20/BX) ORDER ITEM #19593

## (undated) DEVICE — SPONGE GAUZE NON-STERILE 4X4 - (2000/CA 10PK/CA)

## (undated) DEVICE — GOWN SURGEONS LARGE - (32/CA)

## (undated) DEVICE — ELECTRODE 850 FOAM ADHESIVE - HYDROGEL RADIOTRNSPRNT (50/PK)

## (undated) DEVICE — NEEDLE BIOPSY 21G W/LOCKABLE SYRINGE FOR EBUS (5EA/BX)

## (undated) DEVICE — KIT  I.V. START (100EA/CA)

## (undated) DEVICE — CATHETER IV SAFETY 20 GA X 1-1/4 (50/BX)

## (undated) DEVICE — TUBE E-T HI-LO CUFF 7.5MM (10EA/PK)

## (undated) DEVICE — BAG IV VISION ION IF1000 (10EA/BX)

## (undated) DEVICE — TUBE E-T HI-LO CUFF 8.0MM (10EA/PK)

## (undated) DEVICE — CANISTER SUCTION 3000ML MECHANICAL FILTER AUTO SHUTOFF MEDI-VAC NONSTERILE LF DISP  (40EA/CA)

## (undated) DEVICE — SET I.V. EXTENSION DIAL-A- - FLOW REGULATOR (48EA/CA)

## (undated) DEVICE — MASK WITH FACE SHIELD (25/BX 4BX/CA)

## (undated) DEVICE — TUBE E-T HI-LO CUFF 8.5MM (10EA/PK)

## (undated) DEVICE — LACTATED RINGERS INJ 1000 ML - (14EA/CA 60CA/PF)

## (undated) DEVICE — SUCTION INSTRUMENT YANKAUER BULBOUS TIP W/O VENT (50EA/CA)

## (undated) DEVICE — WATER IRRIGATION STERILE 1000ML (12EA/CA)

## (undated) DEVICE — SYRINGE SAFETY 3 ML 18 GA X 1 1/2 BLUNT LL (100/BX 8BX/CA)

## (undated) DEVICE — SYRINGE SAFETY 5 ML 18 GA X 1-1/2 BLUNT LL (100/BX 4BX/CA)

## (undated) DEVICE — FORCEPS BRONCH DISPOSABLE ALLIGATOR JAW (20EA/BX)

## (undated) DEVICE — SYRINGE DISP. 60 CC LL - (30/BX, 12BX/CA)**WHEN THESE ARE GONE ORDER #500206**

## (undated) DEVICE — CATHETER IV SAFETY 24 GA X 3/4 (50EA/BX)

## (undated) DEVICE — SYRINGE SAFETY 10 ML 18 GA X 1 1/2 BLUNT LL (100/BX 4BX/CA)

## (undated) DEVICE — CANISTER SUCTION RIGID RED 1500CC (40EA/CA)

## (undated) DEVICE — NEPTUNE 4 PORT MANIFOLD - (20/PK)

## (undated) DEVICE — CONTAINER SPECIMEN BAG OR - STERILE 4 OZ W/LID (100EA/CA)

## (undated) DEVICE — TUBE SUCTION YANKAUER  1/4 X 6FT (20EA/CA)"

## (undated) DEVICE — NEEDLE BIOPSY FLEXISION OD21 GA IF1000 (5EA/BX)

## (undated) DEVICE — MASK ANESTHESIA ADULT  - (100/CA)

## (undated) DEVICE — SET EXTENSION WITH 2 PORTS (48EA/CA) ***PART #2C8610 IS A SUBSTITUTE*****

## (undated) DEVICE — CANNULA O2 COMFORT SOFT EAR ADULT 7 FT TUBING (50/CA)

## (undated) DEVICE — TUBE E-T HI-LO CUFF 7.0MM (10EA/PK)

## (undated) DEVICE — BRONCHOSCOPE EXALT MODEL B REGULAR (10EA/BX)

## (undated) DEVICE — NEEDLE BIOPSY 19GA SYRINGE VIZISHOT 2 FLEX FOR EBUS

## (undated) DEVICE — CATHETER GUIDING ION (1/EA)

## (undated) DEVICE — COVER LIGHT HANDLE FLEXIBLE - SOFT (2EA/PK 80PK/CA)

## (undated) DEVICE — TOWEL STOP TIMEOUT SAFETY FLAG (40EA/CA)

## (undated) DEVICE — TUBE CONNECTING SUCTION - CLEAR PLASTIC STERILE 72 IN (50EA/CA)

## (undated) DEVICE — KIT ANESTHESIA W/CIRCUIT & 3/LT BAG W/FILTER (20EA/CA)

## (undated) DEVICE — SENSOR OXIMETER ADULT SPO2 RD SET (20EA/BX)